# Patient Record
Sex: FEMALE | Race: BLACK OR AFRICAN AMERICAN | NOT HISPANIC OR LATINO | Employment: FULL TIME | URBAN - METROPOLITAN AREA
[De-identification: names, ages, dates, MRNs, and addresses within clinical notes are randomized per-mention and may not be internally consistent; named-entity substitution may affect disease eponyms.]

---

## 2017-05-23 LAB — POC BETA-HCG (QUAL): NEGATIVE

## 2017-12-14 ENCOUNTER — HISTORICAL (OUTPATIENT)
Dept: ADMINISTRATIVE | Facility: HOSPITAL | Age: 22
End: 2017-12-14

## 2017-12-14 LAB
APPEARANCE, UA: ABNORMAL
BACTERIA #/AREA URNS AUTO: ABNORMAL /[HPF]
BILIRUB SERPL-MCNC: NEGATIVE MG/DL
BILIRUB UR QL STRIP: NEGATIVE
BLOOD URINE, POC: NEGATIVE
CLARITY, POC UA: NORMAL
COLOR UR: YELLOW
COLOR, POC UA: YELLOW
GLUCOSE (UA): NORMAL
GLUCOSE UR QL STRIP: NEGATIVE
HGB UR QL STRIP: NEGATIVE
HYALINE CASTS #/AREA URNS LPF: ABNORMAL /[LPF]
KETONES UR QL STRIP: NEGATIVE
KETONES UR QL STRIP: NEGATIVE
LEUKOCYTE EST, POC UA: NORMAL
LEUKOCYTE ESTERASE UR QL STRIP: 500 LEU/UL
NITRITE UR QL STRIP: ABNORMAL
NITRITE, POC UA: POSITIVE
PH UR STRIP: 6.5 [PH] (ref 4.5–8)
PH, POC UA: 6
POC BETA-HCG (QUAL): NEGATIVE
PROT UR QL STRIP: 10 MG/DL
PROTEIN, POC: NORMAL
RBC #/AREA URNS AUTO: ABNORMAL /[HPF]
SP GR UR STRIP: 1.02 (ref 1–1.03)
SPECIFIC GRAVITY, POC UA: 1.01
SQUAMOUS #/AREA URNS LPF: ABNORMAL /[LPF]
UROBILINOGEN UR STRIP-ACNC: NORMAL
UROBILINOGEN, POC UA: NORMAL
WBC #/AREA URNS AUTO: >=100 /HPF

## 2018-11-06 ENCOUNTER — HISTORICAL (OUTPATIENT)
Dept: ADMINISTRATIVE | Facility: HOSPITAL | Age: 23
End: 2018-11-06

## 2018-11-06 LAB
ABS NEUT (OLG): 10.99 X10(3)/MCL (ref 2.1–9.2)
BASOPHILS # BLD AUTO: 0.1 X10(3)/MCL (ref 0–0.2)
BASOPHILS NFR BLD AUTO: 0 %
BUN SERPL-MCNC: 12 MG/DL (ref 7–18)
CALCIUM SERPL-MCNC: 9.7 MG/DL (ref 8.5–10.1)
CHLORIDE SERPL-SCNC: 106 MMOL/L (ref 98–107)
CO2 SERPL-SCNC: 23 MMOL/L (ref 21–32)
CREAT SERPL-MCNC: 0.8 MG/DL (ref 0.55–1.02)
CREAT/UREA NIT SERPL: 15
EOSINOPHIL # BLD AUTO: 0.4 X10(3)/MCL (ref 0–0.9)
EOSINOPHIL NFR BLD AUTO: 3 %
ERYTHROCYTE [DISTWIDTH] IN BLOOD BY AUTOMATED COUNT: 12.6 % (ref 11.5–17)
FLUAV AG NPH QL IA: NEGATIVE
FLUBV AG NPH QL IA: NEGATIVE
GLUCOSE SERPL-MCNC: 94 MG/DL (ref 74–106)
GRAM STN SPEC: NORMAL
HCT VFR BLD AUTO: 40.5 % (ref 37–47)
HGB BLD-MCNC: 12.4 GM/DL (ref 12–16)
LYMPHOCYTES # BLD AUTO: 1.8 X10(3)/MCL (ref 0.6–4.6)
LYMPHOCYTES NFR BLD AUTO: 12 %
MCH RBC QN AUTO: 25.9 PG (ref 27–31)
MCHC RBC AUTO-ENTMCNC: 30.6 GM/DL (ref 33–36)
MCV RBC AUTO: 84.6 FL (ref 80–94)
MONOCYTES # BLD AUTO: 1 X10(3)/MCL (ref 0.1–1.3)
MONOCYTES NFR BLD AUTO: 7 %
NEUTROPHILS # BLD AUTO: 10.99 X10(3)/MCL (ref 2.1–9.2)
NEUTROPHILS NFR BLD AUTO: 77 %
PLATELET # BLD AUTO: 328 X10(3)/MCL (ref 130–400)
PMV BLD AUTO: 9.9 FL (ref 9.4–12.4)
POTASSIUM SERPL-SCNC: 4.1 MMOL/L (ref 3.5–5.1)
RBC # BLD AUTO: 4.79 X10(6)/MCL (ref 4.2–5.4)
SODIUM SERPL-SCNC: 138 MMOL/L (ref 136–145)
WBC # SPEC AUTO: 14.2 X10(3)/MCL (ref 4.5–11.5)

## 2018-11-08 LAB
FINAL CULTURE: NORMAL
RAPID GROUP A STREP (OHS): NEGATIVE

## 2020-05-08 ENCOUNTER — HISTORICAL (OUTPATIENT)
Dept: LAB | Facility: HOSPITAL | Age: 25
End: 2020-05-08

## 2020-08-24 ENCOUNTER — HISTORICAL (OUTPATIENT)
Dept: LAB | Facility: HOSPITAL | Age: 25
End: 2020-08-24

## 2020-08-24 LAB
ABS NEUT (OLG): 8.59 X10(3)/MCL (ref 2.1–9.2)
AMPHET UR QL SCN: NORMAL
BARBITURATE SCN PRESENT UR: NORMAL
BASOPHILS # BLD AUTO: 0 X10(3)/MCL (ref 0–0.2)
BASOPHILS NFR BLD AUTO: 0 %
BENZODIAZ UR QL SCN: NORMAL
CANNABINOIDS UR QL SCN: NORMAL
COCAINE UR QL SCN: NORMAL
EOSINOPHIL # BLD AUTO: 0.2 X10(3)/MCL (ref 0–0.9)
EOSINOPHIL NFR BLD AUTO: 2 %
ERYTHROCYTE [DISTWIDTH] IN BLOOD BY AUTOMATED COUNT: 13.8 % (ref 11.5–17)
GROUP & RH: NORMAL
HBV SURFACE AG SERPL QL IA: NONREACTIVE
HCT VFR BLD AUTO: 35.1 % (ref 37–47)
HCV AB SERPL QL IA: NONREACTIVE
HGB BLD-MCNC: 11.4 GM/DL (ref 12–16)
HIV 1+2 AB+HIV1 P24 AG SERPL QL IA: NONREACTIVE
IMM GRANULOCYTES # BLD AUTO: 0.01 % (ref 0–0.02)
IMM GRANULOCYTES NFR BLD AUTO: 0.1 % (ref 0–0.43)
LYMPHOCYTES # BLD AUTO: 3.5 X10(3)/MCL (ref 0.6–4.6)
LYMPHOCYTES NFR BLD AUTO: 26 %
MCH RBC QN AUTO: 27.3 PG (ref 27–31)
MCHC RBC AUTO-ENTMCNC: 32.5 GM/DL (ref 33–36)
MCV RBC AUTO: 84 FL (ref 80–94)
MONOCYTES # BLD AUTO: 0.8 X10(3)/MCL (ref 0.1–1.3)
MONOCYTES NFR BLD AUTO: 6 %
NEUTROPHILS # BLD AUTO: 8.59 X10(3)/MCL (ref 1.4–7.9)
NEUTROPHILS NFR BLD AUTO: 65 %
OPIATES UR QL SCN: NORMAL
PCP UR QL: NORMAL
PH UR STRIP.AUTO: 6.5 [PH] (ref 5–7.5)
PLATELET # BLD AUTO: 324 X10(3)/MCL (ref 130–400)
PMV BLD AUTO: 10.4 FL (ref 9.4–12.4)
POC BETA-HCG (QUAL): POSITIVE
RBC # BLD AUTO: 4.18 X10(6)/MCL (ref 4.2–5.4)
T PALLIDUM AB SER QL: NONREACTIVE
T4 FREE SERPL-MCNC: 1.05 NG/DL (ref 0.76–1.46)
TSH SERPL-ACNC: 1.13 MIU/ML (ref 0.36–3.74)
WBC # SPEC AUTO: 13.2 X10(3)/MCL (ref 4.5–11.5)

## 2020-10-19 ENCOUNTER — HISTORICAL (OUTPATIENT)
Dept: LAB | Facility: HOSPITAL | Age: 25
End: 2020-10-19

## 2020-12-17 ENCOUNTER — HISTORICAL (OUTPATIENT)
Dept: LAB | Facility: HOSPITAL | Age: 25
End: 2020-12-17

## 2020-12-17 LAB — GLUCOSE 1H P 100 G GLC PO SERPL-MCNC: 95 MG/DL (ref 100–180)

## 2021-02-22 ENCOUNTER — HISTORICAL (OUTPATIENT)
Dept: ADMINISTRATIVE | Facility: HOSPITAL | Age: 26
End: 2021-02-22

## 2021-02-23 ENCOUNTER — HISTORICAL (OUTPATIENT)
Dept: LAB | Facility: HOSPITAL | Age: 26
End: 2021-02-23

## 2021-02-23 LAB
ABS NEUT (OLG): 8.27 X10(3)/MCL (ref 2.1–9.2)
BASOPHILS # BLD AUTO: 0 X10(3)/MCL (ref 0–0.2)
BASOPHILS NFR BLD AUTO: 0 %
EOSINOPHIL # BLD AUTO: 0.3 X10(3)/MCL (ref 0–0.9)
EOSINOPHIL NFR BLD AUTO: 3 %
ERYTHROCYTE [DISTWIDTH] IN BLOOD BY AUTOMATED COUNT: 13.2 % (ref 11.5–17)
HCT VFR BLD AUTO: 34.6 % (ref 37–47)
HGB BLD-MCNC: 10.5 GM/DL (ref 12–16)
HIV 1+2 AB+HIV1 P24 AG SERPL QL IA: NONREACTIVE
IMM GRANULOCYTES # BLD AUTO: 0.05 % (ref 0–0.02)
IMM GRANULOCYTES NFR BLD AUTO: 0.4 % (ref 0–0.43)
LYMPHOCYTES # BLD AUTO: 2.4 X10(3)/MCL (ref 0.6–4.6)
LYMPHOCYTES NFR BLD AUTO: 20 %
MCH RBC QN AUTO: 26.3 PG (ref 27–31)
MCHC RBC AUTO-ENTMCNC: 30.3 GM/DL (ref 33–36)
MCV RBC AUTO: 86.5 FL (ref 80–94)
MONOCYTES # BLD AUTO: 1 X10(3)/MCL (ref 0.1–1.3)
MONOCYTES NFR BLD AUTO: 8 %
NEUTROPHILS # BLD AUTO: 8.27 X10(3)/MCL (ref 1.4–7.9)
NEUTROPHILS NFR BLD AUTO: 69 %
PLATELET # BLD AUTO: 248 X10(3)/MCL (ref 130–400)
PMV BLD AUTO: 11 FL (ref 9.4–12.4)
RBC # BLD AUTO: 4 X10(6)/MCL (ref 4.2–5.4)
T PALLIDUM AB SER QL: NONREACTIVE
WBC # SPEC AUTO: 12.1 X10(3)/MCL (ref 4.5–11.5)

## 2021-02-25 LAB — FINAL CULTURE: NORMAL

## 2021-04-01 ENCOUNTER — HOSPITAL ENCOUNTER (OUTPATIENT)
Dept: OBSTETRICS AND GYNECOLOGY | Facility: HOSPITAL | Age: 26
End: 2021-04-02
Attending: OBSTETRICS & GYNECOLOGY | Admitting: OBSTETRICS & GYNECOLOGY

## 2021-04-01 LAB
ABS NEUT (OLG): 19.37 X10(3)/MCL (ref 2.1–9.2)
APPEARANCE, UA: CLEAR
BACTERIA SPEC CULT: ABNORMAL /HPF
BILIRUB UR QL STRIP: NEGATIVE
COLOR UR: YELLOW
EOSINOPHIL NFR BLD MANUAL: 1 % (ref 0–8)
ERYTHROCYTE [DISTWIDTH] IN BLOOD BY AUTOMATED COUNT: 15.1 % (ref 11.5–17)
GLUCOSE (UA): NEGATIVE
HCT VFR BLD AUTO: 30.8 % (ref 37–47)
HGB BLD-MCNC: 8.9 GM/DL (ref 12–16)
HGB UR QL STRIP: NEGATIVE
HYPOCHROMIA BLD QL SMEAR: ABNORMAL
KETONES UR QL STRIP: ABNORMAL
LEUKOCYTE ESTERASE UR QL STRIP: NEGATIVE
LYMPHOCYTES NFR BLD MANUAL: 12 % (ref 13–40)
MCH RBC QN AUTO: 25.5 PG (ref 27–31)
MCHC RBC AUTO-ENTMCNC: 28.9 GM/DL (ref 33–36)
MCV RBC AUTO: 88.3 FL (ref 80–94)
MONOCYTES NFR BLD MANUAL: 4 % (ref 2–11)
NEUTROPHILS NFR BLD MANUAL: 84 % (ref 47–80)
NITRITE UR QL STRIP: NEGATIVE
PH UR STRIP: 7 [PH] (ref 5–9)
PLATELET # BLD AUTO: 414 X10(3)/MCL (ref 130–400)
PLATELET # BLD EST: ABNORMAL 10*3/UL
PMV BLD AUTO: 10.1 FL (ref 7.4–10.4)
POIKILOCYTOSIS BLD QL SMEAR: ABNORMAL
PROT UR QL STRIP: NEGATIVE
RBC # BLD AUTO: 3.49 X10(6)/MCL (ref 4.2–5.4)
RBC #/AREA URNS HPF: ABNORMAL /[HPF]
RBC MORPH BLD: ABNORMAL
SP GR UR STRIP: 1.01 (ref 1–1.03)
SQUAMOUS EPITHELIAL, UA: ABNORMAL /HPF (ref 0–4)
UROBILINOGEN UR STRIP-ACNC: 1
WBC # SPEC AUTO: 25.2 X10(3)/MCL (ref 4.5–11.5)
WBC #/AREA URNS HPF: ABNORMAL /[HPF]

## 2021-04-04 LAB — FINAL CULTURE: NORMAL

## 2021-05-04 LAB — POC BETA-HCG (QUAL): NEGATIVE

## 2021-07-06 LAB — POC BETA-HCG (QUAL): NEGATIVE

## 2021-08-03 LAB
HUMAN PAPILLOMAVIRUS (HPV): NORMAL
PAP RECOMMENDATION EXT: NORMAL
POC BETA-HCG (QUAL): NEGATIVE

## 2021-10-29 LAB — POC BETA-HCG (QUAL): NEGATIVE

## 2022-04-10 ENCOUNTER — HISTORICAL (OUTPATIENT)
Dept: ADMINISTRATIVE | Facility: HOSPITAL | Age: 27
End: 2022-04-10

## 2022-04-25 VITALS
WEIGHT: 158 LBS | DIASTOLIC BLOOD PRESSURE: 67 MMHG | BODY MASS INDEX: 28 KG/M2 | SYSTOLIC BLOOD PRESSURE: 114 MMHG | HEIGHT: 63 IN | OXYGEN SATURATION: 98 %

## 2022-09-18 ENCOUNTER — HISTORICAL (OUTPATIENT)
Dept: ADMINISTRATIVE | Facility: HOSPITAL | Age: 27
End: 2022-09-18

## 2022-09-19 ENCOUNTER — HISTORICAL (OUTPATIENT)
Dept: ADMINISTRATIVE | Facility: HOSPITAL | Age: 27
End: 2022-09-19

## 2022-10-28 DIAGNOSIS — Z00.00 WELLNESS EXAMINATION: Primary | ICD-10-CM

## 2022-10-31 ENCOUNTER — OFFICE VISIT (OUTPATIENT)
Dept: FAMILY MEDICINE | Facility: CLINIC | Age: 27
End: 2022-10-31
Payer: COMMERCIAL

## 2022-10-31 ENCOUNTER — LAB VISIT (OUTPATIENT)
Dept: LAB | Facility: HOSPITAL | Age: 27
End: 2022-10-31
Attending: NURSE PRACTITIONER
Payer: COMMERCIAL

## 2022-10-31 VITALS
WEIGHT: 175.19 LBS | DIASTOLIC BLOOD PRESSURE: 65 MMHG | BODY MASS INDEX: 32.24 KG/M2 | HEART RATE: 78 BPM | SYSTOLIC BLOOD PRESSURE: 104 MMHG | TEMPERATURE: 99 F | RESPIRATION RATE: 18 BRPM | HEIGHT: 62 IN | OXYGEN SATURATION: 99 %

## 2022-10-31 DIAGNOSIS — Z76.89 ENCOUNTER TO ESTABLISH CARE: ICD-10-CM

## 2022-10-31 DIAGNOSIS — Z00.00 WELLNESS EXAMINATION: ICD-10-CM

## 2022-10-31 DIAGNOSIS — F32.A DEPRESSION, UNSPECIFIED DEPRESSION TYPE: ICD-10-CM

## 2022-10-31 DIAGNOSIS — Z23 ENCOUNTER FOR VACCINATION: Primary | ICD-10-CM

## 2022-10-31 LAB
ALBUMIN SERPL-MCNC: 4 GM/DL (ref 3.5–5)
ALBUMIN/GLOB SERPL: 1.3 RATIO (ref 1.1–2)
ALP SERPL-CCNC: 58 UNIT/L (ref 40–150)
ALT SERPL-CCNC: 13 UNIT/L (ref 0–55)
AST SERPL-CCNC: 15 UNIT/L (ref 5–34)
BASOPHILS # BLD AUTO: 0.04 X10(3)/MCL (ref 0–0.2)
BASOPHILS NFR BLD AUTO: 0.4 %
BILIRUBIN DIRECT+TOT PNL SERPL-MCNC: 0.3 MG/DL
BUN SERPL-MCNC: 10.1 MG/DL (ref 7–18.7)
CALCIUM SERPL-MCNC: 9.1 MG/DL (ref 8.4–10.2)
CHLORIDE SERPL-SCNC: 108 MMOL/L (ref 98–107)
CHOLEST SERPL-MCNC: 134 MG/DL
CHOLEST/HDLC SERPL: 3 {RATIO} (ref 0–5)
CO2 SERPL-SCNC: 22 MMOL/L (ref 22–29)
CREAT SERPL-MCNC: 0.78 MG/DL (ref 0.55–1.02)
DEPRECATED CALCIDIOL+CALCIFEROL SERPL-MC: 8.8 NG/ML (ref 30–80)
EOSINOPHIL # BLD AUTO: 0.42 X10(3)/MCL (ref 0–0.9)
EOSINOPHIL NFR BLD AUTO: 4 %
ERYTHROCYTE [DISTWIDTH] IN BLOOD BY AUTOMATED COUNT: 13.1 % (ref 11.5–17)
EST. AVERAGE GLUCOSE BLD GHB EST-MCNC: 96.8 MG/DL
GFR SERPLBLD CREATININE-BSD FMLA CKD-EPI: >60 MLS/MIN/1.73/M2
GLOBULIN SER-MCNC: 3.1 GM/DL (ref 2.4–3.5)
GLUCOSE SERPL-MCNC: 97 MG/DL (ref 74–100)
HBA1C MFR BLD: 5 %
HCT VFR BLD AUTO: 40.3 % (ref 37–47)
HDLC SERPL-MCNC: 46 MG/DL (ref 35–60)
HGB BLD-MCNC: 12 GM/DL (ref 12–16)
IMM GRANULOCYTES # BLD AUTO: 0.01 X10(3)/MCL (ref 0–0.04)
IMM GRANULOCYTES NFR BLD AUTO: 0.1 %
LDLC SERPL CALC-MCNC: 78 MG/DL (ref 50–140)
LYMPHOCYTES # BLD AUTO: 3.88 X10(3)/MCL (ref 0.6–4.6)
LYMPHOCYTES NFR BLD AUTO: 36.5 %
MCH RBC QN AUTO: 24.9 PG (ref 27–31)
MCHC RBC AUTO-ENTMCNC: 29.8 MG/DL (ref 33–36)
MCV RBC AUTO: 83.8 FL (ref 80–94)
MONOCYTES # BLD AUTO: 0.58 X10(3)/MCL (ref 0.1–1.3)
MONOCYTES NFR BLD AUTO: 5.5 %
NEUTROPHILS # BLD AUTO: 5.7 X10(3)/MCL (ref 2.1–9.2)
NEUTROPHILS NFR BLD AUTO: 53.5 %
PLATELET # BLD AUTO: 358 X10(3)/MCL (ref 130–400)
PMV BLD AUTO: 10.4 FL (ref 7.4–10.4)
POTASSIUM SERPL-SCNC: 4 MMOL/L (ref 3.5–5.1)
PROT SERPL-MCNC: 7.1 GM/DL (ref 6.4–8.3)
RBC # BLD AUTO: 4.81 X10(6)/MCL (ref 4.2–5.4)
SODIUM SERPL-SCNC: 140 MMOL/L (ref 136–145)
TRIGL SERPL-MCNC: 51 MG/DL (ref 37–140)
TSH SERPL-ACNC: 1.35 UIU/ML (ref 0.35–4.94)
VLDLC SERPL CALC-MCNC: 10 MG/DL
WBC # SPEC AUTO: 10.6 X10(3)/MCL (ref 4.5–11.5)

## 2022-10-31 PROCEDURE — 84443 ASSAY THYROID STIM HORMONE: CPT

## 2022-10-31 PROCEDURE — 3074F SYST BP LT 130 MM HG: CPT | Mod: CPTII,,, | Performed by: NURSE PRACTITIONER

## 2022-10-31 PROCEDURE — 99385 PREV VISIT NEW AGE 18-39: CPT | Mod: 25,,, | Performed by: NURSE PRACTITIONER

## 2022-10-31 PROCEDURE — 99212 OFFICE O/P EST SF 10 MIN: CPT | Mod: 25,,, | Performed by: NURSE PRACTITIONER

## 2022-10-31 PROCEDURE — 3074F PR MOST RECENT SYSTOLIC BLOOD PRESSURE < 130 MM HG: ICD-10-PCS | Mod: CPTII,,, | Performed by: NURSE PRACTITIONER

## 2022-10-31 PROCEDURE — 90686 IIV4 VACC NO PRSV 0.5 ML IM: CPT | Mod: ,,, | Performed by: NURSE PRACTITIONER

## 2022-10-31 PROCEDURE — 83036 HEMOGLOBIN GLYCOSYLATED A1C: CPT

## 2022-10-31 PROCEDURE — 1159F PR MEDICATION LIST DOCUMENTED IN MEDICAL RECORD: ICD-10-PCS | Mod: CPTII,,, | Performed by: NURSE PRACTITIONER

## 2022-10-31 PROCEDURE — 90471 PR IMMUNIZ ADMIN,1 SINGLE/COMB VAC/TOXOID: ICD-10-PCS | Mod: ,,, | Performed by: NURSE PRACTITIONER

## 2022-10-31 PROCEDURE — 1160F PR REVIEW ALL MEDS BY PRESCRIBER/CLIN PHARMACIST DOCUMENTED: ICD-10-PCS | Mod: CPTII,,, | Performed by: NURSE PRACTITIONER

## 2022-10-31 PROCEDURE — 85025 COMPLETE CBC W/AUTO DIFF WBC: CPT

## 2022-10-31 PROCEDURE — 90471 IMMUNIZATION ADMIN: CPT | Mod: ,,, | Performed by: NURSE PRACTITIONER

## 2022-10-31 PROCEDURE — 3078F PR MOST RECENT DIASTOLIC BLOOD PRESSURE < 80 MM HG: ICD-10-PCS | Mod: CPTII,,, | Performed by: NURSE PRACTITIONER

## 2022-10-31 PROCEDURE — 99212 PR OFFICE/OUTPT VISIT, EST, LEVL II, 10-19 MIN: ICD-10-PCS | Mod: 25,,, | Performed by: NURSE PRACTITIONER

## 2022-10-31 PROCEDURE — 36415 COLL VENOUS BLD VENIPUNCTURE: CPT

## 2022-10-31 PROCEDURE — 3078F DIAST BP <80 MM HG: CPT | Mod: CPTII,,, | Performed by: NURSE PRACTITIONER

## 2022-10-31 PROCEDURE — 1159F MED LIST DOCD IN RCRD: CPT | Mod: CPTII,,, | Performed by: NURSE PRACTITIONER

## 2022-10-31 PROCEDURE — 99385 PR PREVENTIVE VISIT,NEW,18-39: ICD-10-PCS | Mod: 25,,, | Performed by: NURSE PRACTITIONER

## 2022-10-31 PROCEDURE — 1160F RVW MEDS BY RX/DR IN RCRD: CPT | Mod: CPTII,,, | Performed by: NURSE PRACTITIONER

## 2022-10-31 PROCEDURE — 80053 COMPREHEN METABOLIC PANEL: CPT

## 2022-10-31 PROCEDURE — 80061 LIPID PANEL: CPT

## 2022-10-31 PROCEDURE — 90686 PR FLU VACCINE, QIIV4, NO PRSV, 0.5 ML, IM: ICD-10-PCS | Mod: ,,, | Performed by: NURSE PRACTITIONER

## 2022-10-31 PROCEDURE — 82306 VITAMIN D 25 HYDROXY: CPT

## 2022-10-31 RX ORDER — MEDROXYPROGESTERONE ACETATE 150 MG/ML
INJECTION, SUSPENSION INTRAMUSCULAR
COMMUNITY
Start: 2022-10-24 | End: 2023-04-25 | Stop reason: CLARIF

## 2022-10-31 RX ORDER — ESCITALOPRAM OXALATE 5 MG/1
5 TABLET ORAL DAILY
Qty: 30 TABLET | Refills: 11 | Status: SHIPPED | OUTPATIENT
Start: 2022-10-31 | End: 2022-11-30 | Stop reason: SDUPTHER

## 2022-10-31 NOTE — ASSESSMENT & PLAN NOTE
Symptoms uncontrolled. Start Lexapro 5 mg p.o. daily.  Return to clinic in 1 month for follow-up appointment.  Report to ED with any suicidal or homicidal ideations.

## 2022-10-31 NOTE — PROGRESS NOTES
ANNUAL WELLNESS NOTE    Chief Complaint:  Establish Care, Annual Exam, Depression, Back Pain, Irregular Heart Beat (Elevated pulse at times), and Anxiety     HPI:  Yesi Porter is a 27 y.o. female who presents to the clinic to establish care.    No past medical history on file.    Patient Care Team:  CATY Butt as PCP - General (Nurse Practitioner)    Nursing Assessments and Health Risk Assessment:  No flowsheet data found.  Fall Risk Assessment - Outpatient 10/31/2022   Mobility Status Ambulatory   Number of falls 0   Identified as fall risk 0       Review of Systems   Constitutional: Negative.    HENT: Negative.     Eyes: Negative.    Respiratory: Negative.     Cardiovascular: Negative.    Gastrointestinal: Negative.    Endocrine: Negative.    Genitourinary: Negative.    Musculoskeletal: Negative.    Integumentary:  Negative.   Allergic/Immunologic: Negative.    Neurological: Negative.    Hematological: Negative.    Psychiatric/Behavioral:  Positive for dysphoric mood. The patient is nervous/anxious.      Physical Exam  Constitutional:       Appearance: Normal appearance. She is obese.   HENT:      Head: Normocephalic.      Right Ear: Tympanic membrane, ear canal and external ear normal.      Left Ear: Tympanic membrane, ear canal and external ear normal.      Nose: Nose normal.      Mouth/Throat:      Mouth: Mucous membranes are moist.      Pharynx: Oropharynx is clear.   Eyes:      Extraocular Movements: Extraocular movements intact.      Conjunctiva/sclera: Conjunctivae normal.      Pupils: Pupils are equal, round, and reactive to light.   Cardiovascular:      Rate and Rhythm: Normal rate and regular rhythm.      Pulses: Normal pulses.      Heart sounds: Normal heart sounds.   Pulmonary:      Effort: Pulmonary effort is normal.      Breath sounds: Normal breath sounds.   Abdominal:      General: Bowel sounds are normal.      Palpations: Abdomen is soft.   Musculoskeletal:         General: Normal  range of motion.      Cervical back: Normal range of motion and neck supple.   Skin:     General: Skin is warm and dry.   Neurological:      General: No focal deficit present.      Mental Status: She is alert and oriented to person, place, and time. Mental status is at baseline.   Psychiatric:         Mood and Affect: Mood is depressed. Affect is tearful.         Speech: Speech normal.         Behavior: Behavior normal.         Thought Content: Thought content normal.         Judgment: Judgment normal.     Results for orders placed or performed in visit on 10/31/22   Comprehensive Metabolic Panel   Result Value Ref Range    Sodium Level 140 136 - 145 mmol/L    Potassium Level 4.0 3.5 - 5.1 mmol/L    Chloride 108 (H) 98 - 107 mmol/L    Carbon Dioxide 22 22 - 29 mmol/L    Glucose Level 97 74 - 100 mg/dL    Blood Urea Nitrogen 10.1 7.0 - 18.7 mg/dL    Creatinine 0.78 0.55 - 1.02 mg/dL    Calcium Level Total 9.1 8.4 - 10.2 mg/dL    Protein Total 7.1 6.4 - 8.3 gm/dL    Albumin Level 4.0 3.5 - 5.0 gm/dL    Globulin 3.1 2.4 - 3.5 gm/dL    Albumin/Globulin Ratio 1.3 1.1 - 2.0 ratio    Bilirubin Total 0.3 <=1.5 mg/dL    Alkaline Phosphatase 58 40 - 150 unit/L    Alanine Aminotransferase 13 0 - 55 unit/L    Aspartate Aminotransferase 15 5 - 34 unit/L    eGFR >60 mls/min/1.73/m2   Lipid Panel   Result Value Ref Range    Cholesterol Total 134 <=200 mg/dL    HDL Cholesterol 46 35 - 60 mg/dL    Triglyceride 51 37 - 140 mg/dL    Cholesterol/HDL Ratio 3 0 - 5    Very Low Density Lipoprotein 10     LDL Cholesterol 78.00 50.00 - 140.00 mg/dL   Hemoglobin A1C   Result Value Ref Range    Hemoglobin A1c 5.0 <=7.0 %    Estimated Average Glucose 96.8 mg/dL   Vitamin D   Result Value Ref Range    Vit D 25 OH 8.8 (L) 30.0 - 80.0 ng/mL   TSH   Result Value Ref Range    Thyroid Stimulating Hormone 1.3530 0.3500 - 4.9400 uIU/mL   CBC with Differential   Result Value Ref Range    WBC 10.6 4.5 - 11.5 x10(3)/mcL    RBC 4.81 4.20 - 5.40 x10(6)/mcL     Hgb 12.0 12.0 - 16.0 gm/dL    Hct 40.3 37.0 - 47.0 %    MCV 83.8 80.0 - 94.0 fL    MCH 24.9 (L) 27.0 - 31.0 pg    MCHC 29.8 (L) 33.0 - 36.0 mg/dL    RDW 13.1 11.5 - 17.0 %    Platelet 358 130 - 400 x10(3)/mcL    MPV 10.4 7.4 - 10.4 fL    Neut % 53.5 %    Lymph % 36.5 %    Mono % 5.5 %    Eos % 4.0 %    Basophil % 0.4 %    Lymph # 3.88 0.6 - 4.6 x10(3)/mcL    Neut # 5.7 2.1 - 9.2 x10(3)/mcL    Mono # 0.58 0.1 - 1.3 x10(3)/mcL    Eos # 0.42 0 - 0.9 x10(3)/mcL    Baso # 0.04 0 - 0.2 x10(3)/mcL    IG# 0.01 0 - 0.04 x10(3)/mcL    IG% 0.1 %     Health Maintenance:  Health Maintenance Topics with due status: Not Due       Topic Last Completion Date    TETANUS VACCINE 03/31/2018     Health Maintenance Due   Topic Date Due    HPV Vaccines (1 - 2-dose series) Never done    Pap Smear  Never done    COVID-19 Vaccine (3 - Booster for Pfizer series) 10/27/2021     Assessment/Plan:  1. Encounter for vaccination  Assessment & Plan:  Influenza vaccination given in office.  Patient tolerated well.      2. Depression, unspecified depression type  Assessment & Plan:  Symptoms uncontrolled. Start Lexapro 5 mg p.o. daily.  Return to clinic in 1 month for follow-up appointment.  Report to ED with any suicidal or homicidal ideations.      3. Wellness examination    4. Encounter to establish care    Other orders  -     influenza (QUADRIVALENT PF) 60 mcg (15 mcg x 4)/0.5 mL vaccine  -     EScitalopram oxalate (LEXAPRO) 5 MG Tab; Take 1 tablet (5 mg total) by mouth once daily.  Dispense: 30 tablet; Refill: 11      RTC in 1 month for follow up appt.

## 2022-11-01 RX ORDER — ASPIRIN 325 MG
50000 TABLET, DELAYED RELEASE (ENTERIC COATED) ORAL WEEKLY
Qty: 12 CAPSULE | Refills: 0 | Status: SHIPPED | OUTPATIENT
Start: 2022-11-01 | End: 2022-11-17 | Stop reason: SDUPTHER

## 2022-11-17 RX ORDER — ASPIRIN 325 MG
50000 TABLET, DELAYED RELEASE (ENTERIC COATED) ORAL WEEKLY
Qty: 12 CAPSULE | Refills: 0 | Status: SHIPPED | OUTPATIENT
Start: 2022-11-17 | End: 2023-02-03

## 2022-11-29 NOTE — PROGRESS NOTES
Patient Centered Pre Visit :    Pre-Visit Chart Review    Seen any other health care providers since last visit? NO    Seen in ER, urgent care clinic, or been admitted since last visit? NO    Preventative health screenings current:   Mammogram DUE:N/A  Colonoscopy DUE:N/A    Target Diagnosis: DEPRESSION    Lab work/Tests Needed:NO    Wellness: 10/2022

## 2022-11-30 ENCOUNTER — OFFICE VISIT (OUTPATIENT)
Dept: FAMILY MEDICINE | Facility: CLINIC | Age: 27
End: 2022-11-30
Payer: COMMERCIAL

## 2022-11-30 VITALS
OXYGEN SATURATION: 98 % | SYSTOLIC BLOOD PRESSURE: 107 MMHG | WEIGHT: 174.38 LBS | TEMPERATURE: 99 F | HEIGHT: 62 IN | RESPIRATION RATE: 18 BRPM | BODY MASS INDEX: 32.09 KG/M2 | DIASTOLIC BLOOD PRESSURE: 68 MMHG | HEART RATE: 78 BPM

## 2022-11-30 DIAGNOSIS — F32.A DEPRESSION, UNSPECIFIED DEPRESSION TYPE: Primary | ICD-10-CM

## 2022-11-30 PROCEDURE — 3074F PR MOST RECENT SYSTOLIC BLOOD PRESSURE < 130 MM HG: ICD-10-PCS | Mod: CPTII,,, | Performed by: NURSE PRACTITIONER

## 2022-11-30 PROCEDURE — 3078F PR MOST RECENT DIASTOLIC BLOOD PRESSURE < 80 MM HG: ICD-10-PCS | Mod: CPTII,,, | Performed by: NURSE PRACTITIONER

## 2022-11-30 PROCEDURE — 3008F BODY MASS INDEX DOCD: CPT | Mod: CPTII,,, | Performed by: NURSE PRACTITIONER

## 2022-11-30 PROCEDURE — 3078F DIAST BP <80 MM HG: CPT | Mod: CPTII,,, | Performed by: NURSE PRACTITIONER

## 2022-11-30 PROCEDURE — 3008F PR BODY MASS INDEX (BMI) DOCUMENTED: ICD-10-PCS | Mod: CPTII,,, | Performed by: NURSE PRACTITIONER

## 2022-11-30 PROCEDURE — 1159F PR MEDICATION LIST DOCUMENTED IN MEDICAL RECORD: ICD-10-PCS | Mod: CPTII,,, | Performed by: NURSE PRACTITIONER

## 2022-11-30 PROCEDURE — 3074F SYST BP LT 130 MM HG: CPT | Mod: CPTII,,, | Performed by: NURSE PRACTITIONER

## 2022-11-30 PROCEDURE — 1159F MED LIST DOCD IN RCRD: CPT | Mod: CPTII,,, | Performed by: NURSE PRACTITIONER

## 2022-11-30 PROCEDURE — 99213 OFFICE O/P EST LOW 20 MIN: CPT | Mod: ,,, | Performed by: NURSE PRACTITIONER

## 2022-11-30 PROCEDURE — 99213 PR OFFICE/OUTPT VISIT, EST, LEVL III, 20-29 MIN: ICD-10-PCS | Mod: ,,, | Performed by: NURSE PRACTITIONER

## 2022-11-30 RX ORDER — ESCITALOPRAM OXALATE 10 MG/1
10 TABLET ORAL DAILY
Qty: 30 TABLET | Refills: 11 | Status: SHIPPED | OUTPATIENT
Start: 2022-11-30 | End: 2023-07-12

## 2022-11-30 NOTE — ASSESSMENT & PLAN NOTE
Not at goal.  Patient reports improvement in symptoms but would like to increase dose.  Reports occasional episodes depression.  Lexapro increased to 10 mg p.o. daily.  Return to clinic in 1 month for follow-up appointment.  Report to the ED with any suicidal or homicidal ideations.

## 2022-11-30 NOTE — PROGRESS NOTES
Subjective:       Patient ID: Yesi Porter is a 27 y.o. female.    ----------------------------  Depression     Chief Complaint: Depression    This is a 27-year-old female presents to the clinic for a one-month follow-up appointment for depression management.  Patient reports she notices some improvement with symptoms.  She does not feel that they are completely resolved.  Requesting to increase dose.  Denies any suicidal or homicidal ideations.    Review of Systems   Constitutional: Negative.    HENT: Negative.     Eyes: Negative.    Respiratory: Negative.     Cardiovascular: Negative.    Gastrointestinal: Negative.    Endocrine: Negative.    Genitourinary: Negative.    Musculoskeletal: Negative.    Integumentary:  Negative.   Allergic/Immunologic: Negative.    Neurological: Negative.    Hematological: Negative.    Psychiatric/Behavioral:  Positive for dysphoric mood. The patient is nervous/anxious.          Objective:      Physical Exam  Constitutional:       Appearance: Normal appearance. She is obese.   HENT:      Head: Normocephalic.      Right Ear: Tympanic membrane, ear canal and external ear normal.      Left Ear: Tympanic membrane, ear canal and external ear normal.      Nose: Nose normal.      Mouth/Throat:      Mouth: Mucous membranes are moist.      Pharynx: Oropharynx is clear.   Eyes:      Extraocular Movements: Extraocular movements intact.      Conjunctiva/sclera: Conjunctivae normal.      Pupils: Pupils are equal, round, and reactive to light.   Cardiovascular:      Rate and Rhythm: Normal rate and regular rhythm.      Pulses: Normal pulses.      Heart sounds: Normal heart sounds.   Pulmonary:      Effort: Pulmonary effort is normal.      Breath sounds: Normal breath sounds.   Abdominal:      General: Bowel sounds are normal.      Palpations: Abdomen is soft.   Musculoskeletal:         General: Normal range of motion.      Cervical back: Normal range of motion and neck supple.   Skin:     General:  Skin is warm and dry.   Neurological:      General: No focal deficit present.      Mental Status: She is alert and oriented to person, place, and time. Mental status is at baseline.   Psychiatric:         Mood and Affect: Mood normal.         Behavior: Behavior normal.         Thought Content: Thought content normal.         Judgment: Judgment normal.         Assessment & Plan:   1. Depression, unspecified depression type  Assessment & Plan:  Not at goal.  Patient reports improvement in symptoms but would like to increase dose.  Reports occasional episodes depression.  Lexapro increased to 10 mg p.o. daily.  Return to clinic in 1 month for follow-up appointment.  Report to the ED with any suicidal or homicidal ideations.      Other orders  -     EScitalopram oxalate (LEXAPRO) 10 MG tablet; Take 1 tablet (10 mg total) by mouth once daily.  Dispense: 30 tablet; Refill: 11        Follow up in about 4 weeks (around 12/28/2022) for Depression. In addition to their scheduled follow up, the patient has also been instructed to follow up on as needed basis.

## 2023-01-09 NOTE — PROGRESS NOTES
Patient Centered Pre Visit :    Pre-Visit Chart Review    Seen any other health care providers since last visit?N    Seen in ER, urgent care clinic, or been admitted since last visit?N    Preventative health screenings current:   Never done  Pap Smear    Target Diagnosis: 1 MO FU/DEPRESSION    Lab work/Tests Needed:N    Wellness: 10/2023

## 2023-01-10 ENCOUNTER — OFFICE VISIT (OUTPATIENT)
Dept: FAMILY MEDICINE | Facility: CLINIC | Age: 28
End: 2023-01-10
Payer: COMMERCIAL

## 2023-01-10 VITALS
HEART RATE: 95 BPM | DIASTOLIC BLOOD PRESSURE: 78 MMHG | SYSTOLIC BLOOD PRESSURE: 111 MMHG | HEIGHT: 60 IN | BODY MASS INDEX: 34.99 KG/M2 | TEMPERATURE: 98 F | WEIGHT: 178.19 LBS

## 2023-01-10 DIAGNOSIS — F32.A DEPRESSION, UNSPECIFIED DEPRESSION TYPE: Primary | ICD-10-CM

## 2023-01-10 DIAGNOSIS — E66.9 OBESITY, UNSPECIFIED CLASSIFICATION, UNSPECIFIED OBESITY TYPE, UNSPECIFIED WHETHER SERIOUS COMORBIDITY PRESENT: ICD-10-CM

## 2023-01-10 PROCEDURE — 1160F PR REVIEW ALL MEDS BY PRESCRIBER/CLIN PHARMACIST DOCUMENTED: ICD-10-PCS | Mod: CPTII,,, | Performed by: NURSE PRACTITIONER

## 2023-01-10 PROCEDURE — 1159F PR MEDICATION LIST DOCUMENTED IN MEDICAL RECORD: ICD-10-PCS | Mod: CPTII,,, | Performed by: NURSE PRACTITIONER

## 2023-01-10 PROCEDURE — 3008F PR BODY MASS INDEX (BMI) DOCUMENTED: ICD-10-PCS | Mod: CPTII,,, | Performed by: NURSE PRACTITIONER

## 2023-01-10 PROCEDURE — 99213 PR OFFICE/OUTPT VISIT, EST, LEVL III, 20-29 MIN: ICD-10-PCS | Mod: ,,, | Performed by: NURSE PRACTITIONER

## 2023-01-10 PROCEDURE — 99213 OFFICE O/P EST LOW 20 MIN: CPT | Mod: ,,, | Performed by: NURSE PRACTITIONER

## 2023-01-10 PROCEDURE — 1159F MED LIST DOCD IN RCRD: CPT | Mod: CPTII,,, | Performed by: NURSE PRACTITIONER

## 2023-01-10 PROCEDURE — 3078F DIAST BP <80 MM HG: CPT | Mod: CPTII,,, | Performed by: NURSE PRACTITIONER

## 2023-01-10 PROCEDURE — 3074F SYST BP LT 130 MM HG: CPT | Mod: CPTII,,, | Performed by: NURSE PRACTITIONER

## 2023-01-10 PROCEDURE — 1160F RVW MEDS BY RX/DR IN RCRD: CPT | Mod: CPTII,,, | Performed by: NURSE PRACTITIONER

## 2023-01-10 PROCEDURE — 3074F PR MOST RECENT SYSTOLIC BLOOD PRESSURE < 130 MM HG: ICD-10-PCS | Mod: CPTII,,, | Performed by: NURSE PRACTITIONER

## 2023-01-10 PROCEDURE — 3078F PR MOST RECENT DIASTOLIC BLOOD PRESSURE < 80 MM HG: ICD-10-PCS | Mod: CPTII,,, | Performed by: NURSE PRACTITIONER

## 2023-01-10 PROCEDURE — 3008F BODY MASS INDEX DOCD: CPT | Mod: CPTII,,, | Performed by: NURSE PRACTITIONER

## 2023-01-10 NOTE — PROGRESS NOTES
Subjective:       Patient ID: Yesi Porter is a 27 y.o. female.    ----------------------------  Depression     Chief Complaint: No chief complaint on file.    This is a 27-year-old female who presents to the clinic for 1 month follow-up appt for depression management.  Patient reports improvement in symptoms.  Denies any suicidal or homicidal ideations.    Review of Systems   Constitutional: Negative.    HENT: Negative.     Eyes: Negative.    Respiratory: Negative.     Cardiovascular: Negative.    Gastrointestinal: Negative.    Endocrine: Negative.    Genitourinary: Negative.    Musculoskeletal: Negative.    Integumentary:  Negative.   Allergic/Immunologic: Negative.    Neurological: Negative.    Hematological: Negative.    Psychiatric/Behavioral: Negative.           Objective:      Physical Exam  Constitutional:       Appearance: Normal appearance. She is obese.   HENT:      Head: Normocephalic.      Right Ear: Tympanic membrane, ear canal and external ear normal.      Left Ear: Tympanic membrane, ear canal and external ear normal.      Nose: Nose normal.      Mouth/Throat:      Mouth: Mucous membranes are moist.      Pharynx: Oropharynx is clear.   Eyes:      Extraocular Movements: Extraocular movements intact.      Conjunctiva/sclera: Conjunctivae normal.      Pupils: Pupils are equal, round, and reactive to light.   Cardiovascular:      Rate and Rhythm: Normal rate and regular rhythm.      Pulses: Normal pulses.      Heart sounds: Normal heart sounds.   Pulmonary:      Effort: Pulmonary effort is normal.      Breath sounds: Normal breath sounds.   Abdominal:      General: Bowel sounds are normal.      Palpations: Abdomen is soft.   Musculoskeletal:         General: Normal range of motion.      Cervical back: Normal range of motion and neck supple.   Skin:     General: Skin is warm and dry.   Neurological:      General: No focal deficit present.      Mental Status: She is alert and oriented to person, place, and  time. Mental status is at baseline.   Psychiatric:         Mood and Affect: Mood normal.         Behavior: Behavior normal.         Thought Content: Thought content normal.         Judgment: Judgment normal.         Assessment & Plan:   1. Depression, unspecified depression type  Assessment & Plan:  Stable.  Continue current medication management.  Report to ED with any suicidal or homicidal ideations.      2. Obesity, unspecified classification, unspecified obesity type, unspecified whether serious comorbidity present  Assessment & Plan:  Increase physical activity to at least 30 minutes a day on most days of the week as tolerated.            Follow up in about 6 months (around 7/10/2023) for Depression/Anxiety. In addition to their scheduled follow up, the patient has also been instructed to follow up on as needed basis.

## 2023-01-10 NOTE — ASSESSMENT & PLAN NOTE
Stable.  Continue current medication management.  Report to ED with any suicidal or homicidal ideations.

## 2023-01-18 ENCOUNTER — PATIENT MESSAGE (OUTPATIENT)
Dept: ADMINISTRATIVE | Facility: HOSPITAL | Age: 28
End: 2023-01-18
Payer: COMMERCIAL

## 2023-01-30 PROBLEM — Z00.00 WELLNESS EXAMINATION: Status: RESOLVED | Noted: 2022-10-31 | Resolved: 2023-01-30

## 2023-02-09 ENCOUNTER — DOCUMENTATION ONLY (OUTPATIENT)
Dept: ADMINISTRATIVE | Facility: HOSPITAL | Age: 28
End: 2023-02-09
Payer: COMMERCIAL

## 2023-02-15 ENCOUNTER — HOSPITAL ENCOUNTER (EMERGENCY)
Facility: HOSPITAL | Age: 28
Discharge: HOME | End: 2023-02-15
Attending: FAMILY MEDICINE
Payer: COMMERCIAL

## 2023-02-15 VITALS
WEIGHT: 175 LBS | OXYGEN SATURATION: 100 % | BODY MASS INDEX: 32.2 KG/M2 | RESPIRATION RATE: 17 BRPM | DIASTOLIC BLOOD PRESSURE: 63 MMHG | HEART RATE: 102 BPM | HEIGHT: 62 IN | TEMPERATURE: 98 F | SYSTOLIC BLOOD PRESSURE: 120 MMHG

## 2023-02-15 DIAGNOSIS — R55 SYNCOPE, UNSPECIFIED SYNCOPE TYPE: Primary | ICD-10-CM

## 2023-02-15 DIAGNOSIS — R40.20 LOC (LOSS OF CONSCIOUSNESS): ICD-10-CM

## 2023-02-15 DIAGNOSIS — R53.1 WEAKNESS: ICD-10-CM

## 2023-02-15 LAB
ABS NEUT CALC (OHS): 8.2 X10(3)/MCL (ref 2.1–9.2)
ALBUMIN SERPL-MCNC: 4 G/DL (ref 3.5–5)
ALBUMIN/GLOB SERPL: 1.1 RATIO (ref 1.1–2)
ALP SERPL-CCNC: 51 UNIT/L (ref 40–150)
ALT SERPL-CCNC: 13 UNIT/L (ref 0–55)
AMPHET UR QL SCN: NEGATIVE
APPEARANCE UR: CLEAR
AST SERPL-CCNC: 16 UNIT/L (ref 5–34)
B-HCG SERPL QL: NEGATIVE
BACTERIA #/AREA URNS AUTO: ABNORMAL /HPF
BARBITURATE SCN PRESENT UR: NEGATIVE
BENZODIAZ UR QL SCN: NEGATIVE
BILIRUB UR QL STRIP.AUTO: NEGATIVE MG/DL
BILIRUBIN DIRECT+TOT PNL SERPL-MCNC: 0.3 MG/DL
BUN SERPL-MCNC: 13.3 MG/DL (ref 7–18.7)
CALCIUM SERPL-MCNC: 9.7 MG/DL (ref 8.4–10.2)
CANNABINOIDS UR QL SCN: NEGATIVE
CHLORIDE SERPL-SCNC: 106 MMOL/L (ref 98–107)
CO2 SERPL-SCNC: 21 MMOL/L (ref 22–29)
COCAINE UR QL SCN: NEGATIVE
COLOR UR AUTO: YELLOW
CREAT SERPL-MCNC: 0.81 MG/DL (ref 0.55–1.02)
EOSINOPHIL NFR BLD MANUAL: 0.15 X10(3)/MCL (ref 0–0.9)
EOSINOPHIL NFR BLD MANUAL: 1 % (ref 0–8)
ERYTHROCYTE [DISTWIDTH] IN BLOOD BY AUTOMATED COUNT: 13 % (ref 11.5–17)
GFR SERPLBLD CREATININE-BSD FMLA CKD-EPI: >60 MLS/MIN/1.73/M2
GLOBULIN SER-MCNC: 3.7 GM/DL (ref 2.4–3.5)
GLUCOSE SERPL-MCNC: 110 MG/DL (ref 74–100)
GLUCOSE UR QL STRIP.AUTO: NEGATIVE MG/DL
HCT VFR BLD AUTO: 39 % (ref 37–47)
HGB BLD-MCNC: 11.9 GM/DL (ref 12–16)
IMM GRANULOCYTES # BLD AUTO: 0.02 X10(3)/MCL (ref 0–0.04)
IMM GRANULOCYTES NFR BLD AUTO: 0.1 %
KETONES UR QL STRIP.AUTO: ABNORMAL MG/DL
LEUKOCYTE ESTERASE UR QL STRIP.AUTO: NEGATIVE UNIT/L
LYMPHOCYTES NFR BLD MANUAL: 39 % (ref 13–40)
LYMPHOCYTES NFR BLD MANUAL: 5.81 X10(3)/MCL
MCH RBC QN AUTO: 25.5 PG
MCHC RBC AUTO-ENTMCNC: 30.5 MG/DL (ref 33–36)
MCV RBC AUTO: 83.5 FL (ref 80–94)
MONOCYTES NFR BLD MANUAL: 0.74 X10(3)/MCL (ref 0.1–1.3)
MONOCYTES NFR BLD MANUAL: 5 % (ref 2–11)
NEUTROPHILS NFR BLD MANUAL: 55 % (ref 47–80)
NITRITE UR QL STRIP.AUTO: NEGATIVE
OPIATES UR QL SCN: NEGATIVE
PCP UR QL: NEGATIVE
PH UR STRIP.AUTO: 6.5 [PH]
PH UR: 6.5 [PH] (ref 3–11)
PLATELET # BLD AUTO: 351 X10(3)/MCL (ref 130–400)
PLATELET # BLD EST: NORMAL 10*3/UL
PMV BLD AUTO: 9.8 FL (ref 7.4–10.4)
POC CARDIAC TROPONIN I: 0 NG/ML (ref 0–0.08)
POTASSIUM SERPL-SCNC: 3.5 MMOL/L (ref 3.5–5.1)
PROT SERPL-MCNC: 7.7 GM/DL (ref 6.4–8.3)
PROT UR QL STRIP.AUTO: 30 MG/DL
RBC # BLD AUTO: 4.67 X10(6)/MCL (ref 4.2–5.4)
RBC #/AREA URNS AUTO: ABNORMAL /HPF
RBC MORPH BLD: NORMAL
RBC UR QL AUTO: NEGATIVE UNIT/L
SAMPLE: NORMAL
SODIUM SERPL-SCNC: 138 MMOL/L (ref 136–145)
SP GR UR STRIP.AUTO: >=1.03
SQUAMOUS #/AREA URNS AUTO: ABNORMAL /HPF
UROBILINOGEN UR STRIP-ACNC: 0.2 MG/DL
WBC # SPEC AUTO: 14.9 X10(3)/MCL (ref 4.5–11.5)
WBC #/AREA URNS AUTO: ABNORMAL /HPF

## 2023-02-15 PROCEDURE — 85025 COMPLETE CBC W/AUTO DIFF WBC: CPT | Performed by: FAMILY MEDICINE

## 2023-02-15 PROCEDURE — 96360 HYDRATION IV INFUSION INIT: CPT

## 2023-02-15 PROCEDURE — 93005 ELECTROCARDIOGRAM TRACING: CPT

## 2023-02-15 PROCEDURE — 93010 ELECTROCARDIOGRAM REPORT: CPT | Mod: ,,, | Performed by: INTERNAL MEDICINE

## 2023-02-15 PROCEDURE — 80307 DRUG TEST PRSMV CHEM ANLYZR: CPT | Performed by: FAMILY MEDICINE

## 2023-02-15 PROCEDURE — 81001 URINALYSIS AUTO W/SCOPE: CPT | Performed by: FAMILY MEDICINE

## 2023-02-15 PROCEDURE — 93010 EKG 12-LEAD: ICD-10-PCS | Mod: ,,, | Performed by: INTERNAL MEDICINE

## 2023-02-15 PROCEDURE — 99285 EMERGENCY DEPT VISIT HI MDM: CPT | Mod: 25

## 2023-02-15 PROCEDURE — 81025 URINE PREGNANCY TEST: CPT | Performed by: FAMILY MEDICINE

## 2023-02-15 PROCEDURE — 80053 COMPREHEN METABOLIC PANEL: CPT | Performed by: FAMILY MEDICINE

## 2023-02-15 PROCEDURE — 85007 BL SMEAR W/DIFF WBC COUNT: CPT | Performed by: FAMILY MEDICINE

## 2023-02-15 PROCEDURE — 84484 ASSAY OF TROPONIN QUANT: CPT

## 2023-02-15 PROCEDURE — 25000003 PHARM REV CODE 250: Performed by: FAMILY MEDICINE

## 2023-02-15 RX ADMIN — SODIUM CHLORIDE 1000 ML: 9 INJECTION, SOLUTION INTRAVENOUS at 03:02

## 2023-02-15 NOTE — ED PROVIDER NOTES
Encounter Date: 2/15/2023       History     Chief Complaint   Patient presents with    Loss of Consciousness     Pt was in the lab when she went to the bathroom, when pt came back she sat down became hot, when she got up to go get some water, pt became lightheaded and loss consciousness. Per lab staff pt LOC was 2-3 minutes, pt CBG was 82. Pt had given blood around 11am, pt did eat before and after blood donation.       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 went to the restroom she said when she got the restroom she felt hot sat down and get some water started feeling lightheaded and she passed out patient has no history of syncope denies being sick to on no medications that would affect her heart rate she says no history of cardiac disease      Review of patient's allergies indicates:  No Known Allergies  Past Medical History:   Diagnosis Date    Depression      Past Surgical History:   Procedure Laterality Date    ABCESS DRAINAGE       SECTION       Family History   Problem Relation Age of Onset    Atrial fibrillation Mother     Arrhythmia Mother      Social History     Tobacco Use    Smoking status: Never    Smokeless tobacco: Never   Substance Use Topics    Alcohol use: Not Currently     Comment: on occas    Drug use: Never     Review of Systems   Constitutional:  Negative for fever.   HENT:  Negative for sore throat.    Respiratory:  Negative for shortness of breath.    Cardiovascular:  Negative for chest pain.   Gastrointestinal:  Negative for nausea.   Genitourinary:  Negative for dysuria.   Musculoskeletal:  Negative for back pain.   Skin:  Negative for rash.   Neurological:  Positive for syncope. Negative for weakness.   Hematological:  Does not bruise/bleed easily.   All other systems reviewed and are negative.    Physical Exam     Initial Vitals [02/15/23 1426]   BP Pulse Resp Temp SpO2   111/79 (!) 123 20 98 °F (36.7 °C) 98 %      MAP       --         Physical Exam    Nursing note  and vitals reviewed.  Constitutional: She appears well-developed and well-nourished. She is active.   HENT:   Head: Normocephalic and atraumatic.   Nose: Nose normal.   Eyes: Conjunctivae, EOM and lids are normal. Pupils are equal, round, and reactive to light.   Neck: Trachea normal and phonation normal. Neck supple. No thyroid mass present.   Normal range of motion.  Cardiovascular:  Normal rate, regular rhythm, normal heart sounds and normal pulses.           Pulmonary/Chest: Breath sounds normal.   Abdominal: Abdomen is soft. Bowel sounds are normal.   Musculoskeletal:         General: Normal range of motion.      Cervical back: Normal range of motion and neck supple.     Neurological: She is alert and oriented to person, place, and time. She has normal strength and normal reflexes.   Skin: Skin is warm and intact.   Psychiatric: She has a normal mood and affect. Her speech is normal and behavior is normal. Judgment and thought content normal. Cognition and memory are normal.       ED Course   Procedures  Labs Reviewed   COMPREHENSIVE METABOLIC PANEL - Abnormal; Notable for the following components:       Result Value    Carbon Dioxide 21 (*)     Glucose Level 110 (*)     Globulin 3.7 (*)     All other components within normal limits   URINALYSIS, REFLEX TO URINE CULTURE - Abnormal; Notable for the following components:    Protein, UA 30 (*)     Ketones, UA Trace (*)     All other components within normal limits   CBC WITH DIFFERENTIAL - Abnormal; Notable for the following components:    WBC 14.9 (*)     Hgb 11.9 (*)     MCHC 30.5 (*)     All other components within normal limits   MANUAL DIFFERENTIAL - Abnormal; Notable for the following components:    Abs Lymp 5.811 (*)     All other components within normal limits   URINALYSIS, MICROSCOPIC - Abnormal; Notable for the following components:    Squamous Epithelial Cells, UA Few (*)     All other components within normal limits   DRUG SCREEN, URINE (BEAKER) -  Normal    Narrative:     Cut off concentrations:    Amphetamines - 1000 ng/ml  Barbiturates - 200 ng/ml  Benzodiazepine - 200 ng/ml  Cannabinoids (THC) - 50 ng/ml  Cocaine - 300 ng/ml  Fentanyl - 1.0 ng/ml  MDMA - 500 ng/ml  Opiates - 300 ng/ml   Phencyclidine (PCP) - 25 ng/ml    Specimen submitted for drug analysis and tested for pH and specific gravity in order to evaluate sample integrity. Suspect tampering if specific gravity is <1.003 and/or pH is not within the range of 4.5 - 8.0  False negatives may result form substances such as bleach added to urine.  False positives may result for the presence of a substance with similar chemical structure to the drug or its metabolite.    This test provides only a PRELIMINARY analytical test result. A more specific alternate chemical method must be used in order to obtain a confirmed analytical result. Gas chromatography/mass spectrometry (GC/MS) is the preferred confirmatory method. Other chemical confirmation methods are available. Clinical consideration and professional judgement should be applied to any drug of abuse test result, particularly when preliminary positive results are used.    Positive results will be confirmed only at the physicians request. Unconfirmed screening results are to be used only for medical purposes (treatment).        PREGNANCY TEST, URINE RAPID - Normal   CBC W/ AUTO DIFFERENTIAL    Narrative:     The following orders were created for panel order CBC auto differential.  Procedure                               Abnormality         Status                     ---------                               -----------         ------                     CBC with Differential[[id number]]        Abnormal            Final result               Manual Differential[[id number]]          Abnormal            Final result                 Please view results for these tests on the individual orders.   TROPONIN ISTAT   POCT TROPONIN     EKG Readings: (Independently  Interpreted)   Initial Reading: No STEMI. Rhythm: Sinus Tachycardia. Heart Rate: 124. Ectopy: No Ectopy. Conduction: Normal. ST Segments: Normal ST Segments. T Waves: Normal. Axis: Normal. Clinical Impression: Sinus Tachycardia     Imaging Results              CT Head Without Contrast (Final result)  Result time 02/15/23 16:06:49      Final result by Harmony Benavidez MD (02/15/23 16:06:49)                   Impression:      No acute abnormality seen      Electronically signed by: Harmony Benavidez  Date:    02/15/2023  Time:    16:06               Narrative:    EXAMINATION:  CT HEAD WITHOUT CONTRAST    CLINICAL HISTORY:  Syncope, recurrent;    TECHNIQUE:  Multiple axial images were obtained from the base of the brain to the vertex without contrast administration.  Sagittal and coronal reconstructions were performed. .Automatic exposure control  (AEC) is utilized to reduce patient radiation exposure.    COMPARISON:  None    FINDINGS:  There is no intracranial mass or lesion seen.  No hemorrhage is seen.  No infarct is seen.  The ventricles and basilar cisterns appear normal.  Brain parenchyma appears grossly unremarkable.    Posterior fossa appears normal.  The calvarium is intact.  The paranasal sinuses appear grossly unremarkable.                                       X-Ray Chest 1 View (Final result)  Result time 02/15/23 15:36:00      Final result by Ernesto Rodriguez MD (02/15/23 15:36:00)                   Impression:      No acute findings.      Electronically signed by: Ernesto Rodriguez  Date:    02/15/2023  Time:    15:36               Narrative:    EXAMINATION:  XR CHEST 1 VIEW    CLINICAL HISTORY:  Weakness    COMPARISON:  No priors    FINDINGS:  Portable frontal view of the chest was obtained. The heart is not enlarged.  Lungs are clear.  There is no pneumothorax or significant effusion.                                       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  I have ordered and independently interpreted EKG Reading(s) - see prior notes  Clinical Tests:   Lab Tests: Ordered and Reviewed  The following lab test(s) were unremarkable: CBC, BMP, Urinalysis and UPT  Radiological Study: Ordered and Reviewed  Medical Tests: Ordered and Reviewed  ED Management:   Patient is put on telemetry monitor IV fluids were given labs EKGs x-rays CT scan done syncope workup essentially came back unremarkable recommend follow up with Cardiology for Holter monitor           ED Course as of 02/15/23 1628   Wed Feb 15, 2023   1540 POC Cardiac Troponin I: 0.00 [BL]   1540 Preg Test, Ur: Negative [BL]   1540 CO2(!): 21 [BL]   1540 Glucose(!): 110 [BL]   1541 Creatinine: 0.81 [BL]   1541 Protein, UA(!): 30 [BL]   1541 Specific Gravity,UA: >=1.030 [BL]   1541 WBC(!): 14.9 [BL]   1541 Hemoglobin(!): 11.9 [BL]   1541 Hematocrit: 39.0 [BL]   1557 POC Cardiac Troponin I: 0.00 [BL]   1622  CT reviewed no acute changes x-ray reviewed no acute changes both looked at independently by myself all labs reviewed discussed all findings with patient [BL]      ED Course User Index  [BL] John Paul Andrews MD                 Clinical Impression:   Final diagnoses:  [R40.20] LOC (loss of consciousness)  [R53.1] Weakness  [R55] Syncope, unspecified syncope type (Primary)        ED Disposition  Condition    Discharge Stable          ED Prescriptions    None       Follow-up Information       Follow up With Specialties Details Why Contact Info    [facility]  [address]  [address]  [facility] [address]  [phone]               John Paul Andrews MD  02/15/23 8688

## 2023-02-16 ENCOUNTER — TELEPHONE (OUTPATIENT)
Dept: FAMILY MEDICINE | Facility: CLINIC | Age: 28
End: 2023-02-16
Payer: COMMERCIAL

## 2023-02-16 DIAGNOSIS — R55 SYNCOPE, UNSPECIFIED SYNCOPE TYPE: Primary | ICD-10-CM

## 2023-02-16 NOTE — TELEPHONE ENCOUNTER
Pt made aware.    ----- Message from CATY Butt sent at 2/16/2023  8:11 AM CST -----  Regarding: RE: med adv  Referral placed  ----- Message -----  From: Anamika Cali  Sent: 2/15/2023   4:34 PM CST  To: [facility] Staff  Subject: med adv                                          Type:  Needs Medical Advice    Who Called: Yesi  Symptoms (please be specific): irregular heart rate    How long has patient had these symptoms:  today  Pharmacy name and phone #:  [facility] in [place]  Would the patient rather a call back or a response via [facility]?   Best Call Back Number: [phone]  Additional Information: Patient is needing a cardiologist referral recommended from ER doctor. Please call patient back.

## 2023-04-18 ENCOUNTER — TELEPHONE (OUTPATIENT)
Dept: FAMILY MEDICINE | Facility: CLINIC | Age: 28
End: 2023-04-18
Payer: COMMERCIAL

## 2023-04-18 ENCOUNTER — LAB VISIT (OUTPATIENT)
Dept: LAB | Facility: HOSPITAL | Age: 28
End: 2023-04-18
Attending: NURSE PRACTITIONER
Payer: COMMERCIAL

## 2023-04-18 DIAGNOSIS — R35.0 URINARY FREQUENCY: Primary | ICD-10-CM

## 2023-04-18 DIAGNOSIS — R35.0 URINARY FREQUENCY: ICD-10-CM

## 2023-04-18 LAB
APPEARANCE UR: CLEAR
BACTERIA #/AREA URNS AUTO: ABNORMAL /HPF
BILIRUB UR QL STRIP.AUTO: NEGATIVE MG/DL
COLOR UR AUTO: YELLOW
GLUCOSE UR QL STRIP.AUTO: NEGATIVE MG/DL
KETONES UR QL STRIP.AUTO: NEGATIVE MG/DL
LEUKOCYTE ESTERASE UR QL STRIP.AUTO: NEGATIVE UNIT/L
MUCOUS THREADS URNS QL MICRO: ABNORMAL /LPF
NITRITE UR QL STRIP.AUTO: NEGATIVE
PH UR STRIP.AUTO: 6 [PH]
PROT UR QL STRIP.AUTO: NEGATIVE MG/DL
RBC #/AREA URNS AUTO: ABNORMAL /HPF
RBC UR QL AUTO: NEGATIVE UNIT/L
SP GR UR STRIP.AUTO: >=1.03
SQUAMOUS #/AREA URNS AUTO: ABNORMAL /HPF
UROBILINOGEN UR STRIP-ACNC: 0.2 MG/DL
WBC #/AREA URNS AUTO: ABNORMAL /HPF

## 2023-04-18 NOTE — PROGRESS NOTES
Labs reviewed. All within acceptable ranges for age and conditions. No change in treatment needed at this time.

## 2023-04-18 NOTE — TELEPHONE ENCOUNTER
Pt made aware UA ordered and she will be contacted pending results. UV.    ----- Message from CATY Butt sent at 4/18/2023 11:02 AM CDT -----  Regarding: RE: med adv  Done    ----- Message -----  From: Anamika Cali  Sent: 4/18/2023  10:20 AM CDT  To: [facility] Staff  Subject: med adv                                          Type:  Needs Medical Advice    Who Called: Yesi  Symptoms (please be specific): possible UTI   How long has patient had these symptoms:  frequent urination  Pharmacy name and phone #:  [facility] in [place]  Would the patient rather a call back or a response via [facility]?   Best Call Back Number: [phone]  Additional Information: patient would like a UA order sent. Please call patient back.

## 2023-04-18 NOTE — TELEPHONE ENCOUNTER
Pt made aware of results, understanding voiced.    ----- Message from CATY Butt sent at 4/18/2023  2:22 PM CDT -----  Labs reviewed. All within acceptable ranges for age and conditions. No change in treatment needed at this time.

## 2023-05-08 ENCOUNTER — LAB VISIT (OUTPATIENT)
Dept: LAB | Facility: HOSPITAL | Age: 28
End: 2023-05-08
Attending: INTERNAL MEDICINE
Payer: COMMERCIAL

## 2023-05-08 DIAGNOSIS — R06.09 DYSPNEA ON EXERTION: Primary | ICD-10-CM

## 2023-05-08 DIAGNOSIS — R55 SYNCOPE AND COLLAPSE: ICD-10-CM

## 2023-05-08 LAB — FERRITIN SERPL-MCNC: 12.5 NG/ML (ref 4.63–204)

## 2023-05-08 PROCEDURE — 82728 ASSAY OF FERRITIN: CPT

## 2023-05-08 PROCEDURE — 36415 COLL VENOUS BLD VENIPUNCTURE: CPT

## 2023-05-08 PROCEDURE — 82525 ASSAY OF COPPER: CPT | Mod: 90

## 2023-05-09 LAB — COPPER SERPL-MCNC: 149 MCG/DL (ref 77–206)

## 2023-06-13 ENCOUNTER — PATIENT MESSAGE (OUTPATIENT)
Dept: RESEARCH | Facility: HOSPITAL | Age: 28
End: 2023-06-13
Payer: COMMERCIAL

## 2023-06-27 ENCOUNTER — PATIENT MESSAGE (OUTPATIENT)
Dept: RESEARCH | Facility: HOSPITAL | Age: 28
End: 2023-06-27
Payer: COMMERCIAL

## 2023-07-05 ENCOUNTER — PATIENT MESSAGE (OUTPATIENT)
Dept: RESEARCH | Facility: HOSPITAL | Age: 28
End: 2023-07-05
Payer: COMMERCIAL

## 2023-07-11 ENCOUNTER — PATIENT MESSAGE (OUTPATIENT)
Dept: RESEARCH | Facility: HOSPITAL | Age: 28
End: 2023-07-11
Payer: COMMERCIAL

## 2023-07-12 ENCOUNTER — OFFICE VISIT (OUTPATIENT)
Dept: FAMILY MEDICINE | Facility: CLINIC | Age: 28
End: 2023-07-12
Payer: COMMERCIAL

## 2023-07-12 VITALS
OXYGEN SATURATION: 97 % | WEIGHT: 186.81 LBS | SYSTOLIC BLOOD PRESSURE: 109 MMHG | TEMPERATURE: 98 F | DIASTOLIC BLOOD PRESSURE: 71 MMHG | HEART RATE: 81 BPM | BODY MASS INDEX: 34.17 KG/M2

## 2023-07-12 DIAGNOSIS — Z00.00 WELL ADULT EXAM: ICD-10-CM

## 2023-07-12 DIAGNOSIS — F32.A DEPRESSION, UNSPECIFIED DEPRESSION TYPE: Primary | ICD-10-CM

## 2023-07-12 PROCEDURE — 99212 OFFICE O/P EST SF 10 MIN: CPT | Mod: ,,, | Performed by: NURSE PRACTITIONER

## 2023-07-12 PROCEDURE — 1160F PR REVIEW ALL MEDS BY PRESCRIBER/CLIN PHARMACIST DOCUMENTED: ICD-10-PCS | Mod: CPTII,,, | Performed by: NURSE PRACTITIONER

## 2023-07-12 PROCEDURE — 3074F SYST BP LT 130 MM HG: CPT | Mod: CPTII,,, | Performed by: NURSE PRACTITIONER

## 2023-07-12 PROCEDURE — 1160F RVW MEDS BY RX/DR IN RCRD: CPT | Mod: CPTII,,, | Performed by: NURSE PRACTITIONER

## 2023-07-12 PROCEDURE — 3074F PR MOST RECENT SYSTOLIC BLOOD PRESSURE < 130 MM HG: ICD-10-PCS | Mod: CPTII,,, | Performed by: NURSE PRACTITIONER

## 2023-07-12 PROCEDURE — 1159F MED LIST DOCD IN RCRD: CPT | Mod: CPTII,,, | Performed by: NURSE PRACTITIONER

## 2023-07-12 PROCEDURE — 1159F PR MEDICATION LIST DOCUMENTED IN MEDICAL RECORD: ICD-10-PCS | Mod: CPTII,,, | Performed by: NURSE PRACTITIONER

## 2023-07-12 PROCEDURE — 99212 PR OFFICE/OUTPT VISIT, EST, LEVL II, 10-19 MIN: ICD-10-PCS | Mod: ,,, | Performed by: NURSE PRACTITIONER

## 2023-07-12 PROCEDURE — 3078F PR MOST RECENT DIASTOLIC BLOOD PRESSURE < 80 MM HG: ICD-10-PCS | Mod: CPTII,,, | Performed by: NURSE PRACTITIONER

## 2023-07-12 PROCEDURE — 3008F BODY MASS INDEX DOCD: CPT | Mod: CPTII,,, | Performed by: NURSE PRACTITIONER

## 2023-07-12 PROCEDURE — 3008F PR BODY MASS INDEX (BMI) DOCUMENTED: ICD-10-PCS | Mod: CPTII,,, | Performed by: NURSE PRACTITIONER

## 2023-07-12 PROCEDURE — 3078F DIAST BP <80 MM HG: CPT | Mod: CPTII,,, | Performed by: NURSE PRACTITIONER

## 2023-07-12 NOTE — PROGRESS NOTES
Patient ID: [id number]     Chief Complaint: Follow-up (Depression she took for about a couple months and she just stopped. She feels she don't need it .)      HPI:     Yesi Porter is a 27 y.o. female here today for a follow up.   Reports depression has resolved.  Denies any suicidal or homicidal ideations. No other complaints today.      Past Medical History:  has a past medical history of Depression.    Surgical History:  has a past surgical history that includes  section and Abscess drainage.    Family History: family history includes Arrhythmia in her mother; Atrial fibrillation in her mother.    Social History:  reports that she has never smoked. She has never used smokeless tobacco. She reports that she does not currently use alcohol. She reports that she does not use drugs.    No current outpatient medications      Patient has No Known Allergies.     Patient Care Team:  CATY Butt as PCP - General (Nurse Practitioner)  Harini Carmona LPN as Care Coordinator       Subjective:     Review of Systems    12 point review of systems conducted, negative except as stated in the history of present illness. See HPI for details.      Objective:     Visit Vitals  /71 (BP Location: Right arm)   Pulse 81   Temp 98.1 °F (36.7 °C)   Wt 84.7 kg (186 lb 12.8 oz)   SpO2 97%   BMI 34.17 kg/m²       Physical Exam    General: Alert and oriented, No acute distress.  Head: Normocephalic, Atraumatic.  Eye: Pupils are equal, round and reactive to light, Extraocular movements are intact, Sclera non-icteric.  Ears/Nose/Throat: Normal, Mucosa moist,Clear.  Neck/Thyroid: Supple, Non-tender, No carotid bruit, No lymphadenopathy, No JVD, Full range of motion.  Respiratory: Clear to auscultation bilaterally; No wheezes, rales or rhonchi,Non-labored respirations, Symmetrical chest wall expansion.  Cardiovascular: Regular rate and rhythm, S1/S2 normal, No murmurs, rubs or gallops.  Gastrointestinal: Soft,  Non-tender, Non-distended, Normal bowel sounds, No palpable organomegaly.  Musculoskeletal: Normal range of motion.  Integumentary: Warm, Dry, Intact, No suspicious lesions or rashes.  Extremities: No clubbing, cyanosis or edema  Neurologic: No focal deficits, Cranial Nerves II-XII are grossly intact, Motor strength normal upper and lower extremities, Sensory exam intact.  Psychiatric: Normal interaction, Coherent speech, Euthymic mood, Appropriate affect     Labs Reviewed:     Chemistry:  Lab Results   Component Value Date     02/15/2023    K 3.5 02/15/2023    CHLORIDE 106 02/15/2023    BUN 13.3 02/15/2023    CREATININE 0.81 02/15/2023    EGFRNORACEVR >60 02/15/2023    GLUCOSE 110 (H) 02/15/2023    CALCIUM 9.7 02/15/2023    ALKPHOS 51 02/15/2023    LABPROT 7.7 02/15/2023    ALBUMIN 4.0 02/15/2023    BILIDIR 0.10 11/08/2018    IBILI 0.20 11/08/2018    AST 16 02/15/2023    ALT 13 02/15/2023    [id number] 8.8 (L) 10/31/2022    TSH 1.3530 10/31/2022    [id number] 1.05 08/24/2020        Lab Results   Component Value Date    HGBA1C 5.0 10/31/2022        Hematology:  Lab Results   Component Value Date    WBC 14.9 (H) 02/15/2023    HGB 11.9 (L) 02/15/2023    HCT 39.0 02/15/2023     02/15/2023       Lipid Panel:  Lab Results   Component Value Date    CHOL 134 10/31/2022    HDL 46 10/31/2022    LDL 78.00 10/31/2022    TRIG 51 10/31/2022    TOTALCHOLEST 3 10/31/2022        Urine:  Lab Results   Component Value Date    COLORUA Yellow 04/18/2023    APPEARANCEUA Clear 04/18/2023    SGUA >=1.030 04/18/2023    PHUA 6.0 04/18/2023    PROTEINUA Negative 04/18/2023    GLUCOSEUA Negative 04/18/2023    KETONESUA Negative 04/18/2023    BLOODUA Negative 04/18/2023    NITRITESUA Negative 04/18/2023    LEUKOCYTESUR Negative 04/18/2023    RBCUA None Seen 04/18/2023    WBCUA None Seen 04/18/2023    BACTERIA Few (A) 04/18/2023    SQEPUA 2-20 12/14/2017    HYALINECASTS 0-2 (A) 12/14/2017          Assessment:       ICD-10-CM  ICD-9-CM   1. Well adult exam  Z00.00 V70.0   2. Depression, unspecified depression type  F32.A 311        Plan:   1. Depression, unspecified depression type  Resolved.  Report to ED with any suicidal or homicidal ideations.    2. Well adult exam  Return to clinic in 3 months for wellness exam.    - CBC Auto Differential; Future  - Comprehensive Metabolic Panel; Future  - Lipid Panel; Future  - TSH; Future  - Hemoglobin A1C; Future  - Urinalysis; Future  - Vitamin D; Future  - T4, Free; Future  - Urinalysis      Follow up in about 3 months (around 10/12/2023) for Wellness. In addition to their scheduled follow up, the patient has also been instructed to follow up on as needed basis.     Future Appointments   Date Time Provider Department Center   11/1/2023 10:15 AM CATY Butt [facility]   5/8/2024  9:30 AM Tino Guzman MD [facility]        CATY Butt

## 2023-07-19 ENCOUNTER — PATIENT MESSAGE (OUTPATIENT)
Dept: RESEARCH | Facility: HOSPITAL | Age: 28
End: 2023-07-19
Payer: COMMERCIAL

## 2023-07-24 ENCOUNTER — PATIENT MESSAGE (OUTPATIENT)
Dept: RESEARCH | Facility: HOSPITAL | Age: 28
End: 2023-07-24
Payer: COMMERCIAL

## 2023-10-27 ENCOUNTER — LAB VISIT (OUTPATIENT)
Dept: LAB | Facility: HOSPITAL | Age: 28
End: 2023-10-27
Attending: NURSE PRACTITIONER
Payer: COMMERCIAL

## 2023-10-27 DIAGNOSIS — Z00.00 WELL ADULT EXAM: ICD-10-CM

## 2023-10-27 LAB
ALBUMIN SERPL-MCNC: 3.7 G/DL (ref 3.5–5)
ALBUMIN/GLOB SERPL: 1.1 RATIO (ref 1.1–2)
ALP SERPL-CCNC: 54 UNIT/L (ref 40–150)
ALT SERPL-CCNC: 19 UNIT/L (ref 0–55)
APPEARANCE UR: CLEAR
AST SERPL-CCNC: 16 UNIT/L (ref 5–34)
BACTERIA #/AREA URNS AUTO: ABNORMAL /HPF
BASOPHILS # BLD AUTO: 0.04 X10(3)/MCL
BASOPHILS NFR BLD AUTO: 0.4 %
BILIRUB SERPL-MCNC: 0.3 MG/DL
BILIRUB UR QL STRIP.AUTO: NEGATIVE
BUN SERPL-MCNC: 10.7 MG/DL (ref 7–18.7)
CALCIUM SERPL-MCNC: 8.9 MG/DL (ref 8.4–10.2)
CHLORIDE SERPL-SCNC: 110 MMOL/L (ref 98–107)
CHOLEST SERPL-MCNC: 125 MG/DL
CHOLEST/HDLC SERPL: 3 {RATIO} (ref 0–5)
CO2 SERPL-SCNC: 21 MMOL/L (ref 22–29)
COLOR UR AUTO: YELLOW
CREAT SERPL-MCNC: 0.67 MG/DL (ref 0.55–1.02)
DEPRECATED CALCIDIOL+CALCIFEROL SERPL-MC: 12.6 NG/ML (ref 30–80)
EOSINOPHIL # BLD AUTO: 0.26 X10(3)/MCL (ref 0–0.9)
EOSINOPHIL NFR BLD AUTO: 2.8 %
ERYTHROCYTE [DISTWIDTH] IN BLOOD BY AUTOMATED COUNT: 13.8 % (ref 11.5–17)
EST. AVERAGE GLUCOSE BLD GHB EST-MCNC: 99.7 MG/DL
GFR SERPLBLD CREATININE-BSD FMLA CKD-EPI: >60 MLS/MIN/1.73/M2
GLOBULIN SER-MCNC: 3.5 GM/DL (ref 2.4–3.5)
GLUCOSE SERPL-MCNC: 95 MG/DL (ref 74–100)
GLUCOSE UR QL STRIP.AUTO: NEGATIVE
HBA1C MFR BLD: 5.1 %
HCT VFR BLD AUTO: 38.4 % (ref 37–47)
HDLC SERPL-MCNC: 47 MG/DL (ref 35–60)
HGB BLD-MCNC: 11.3 G/DL (ref 12–16)
IMM GRANULOCYTES # BLD AUTO: 0.01 X10(3)/MCL (ref 0–0.04)
IMM GRANULOCYTES NFR BLD AUTO: 0.1 %
KETONES UR QL STRIP.AUTO: NEGATIVE
LDLC SERPL CALC-MCNC: 70 MG/DL (ref 50–140)
LEUKOCYTE ESTERASE UR QL STRIP.AUTO: NEGATIVE
LYMPHOCYTES # BLD AUTO: 3.17 X10(3)/MCL (ref 0.6–4.6)
LYMPHOCYTES NFR BLD AUTO: 33.5 %
MCH RBC QN AUTO: 25 PG (ref 27–31)
MCHC RBC AUTO-ENTMCNC: 29.4 G/DL (ref 33–36)
MCV RBC AUTO: 85 FL (ref 80–94)
MONOCYTES # BLD AUTO: 0.49 X10(3)/MCL (ref 0.1–1.3)
MONOCYTES NFR BLD AUTO: 5.2 %
NEUTROPHILS # BLD AUTO: 5.48 X10(3)/MCL (ref 2.1–9.2)
NEUTROPHILS NFR BLD AUTO: 58 %
NITRITE UR QL STRIP.AUTO: NEGATIVE
PH UR STRIP.AUTO: 6.5 [PH]
PLATELET # BLD AUTO: 345 X10(3)/MCL (ref 130–400)
PMV BLD AUTO: 9.9 FL (ref 7.4–10.4)
POTASSIUM SERPL-SCNC: 4 MMOL/L (ref 3.5–5.1)
PROT SERPL-MCNC: 7.2 GM/DL (ref 6.4–8.3)
PROT UR QL STRIP.AUTO: NEGATIVE
RBC # BLD AUTO: 4.52 X10(6)/MCL (ref 4.2–5.4)
RBC #/AREA URNS AUTO: ABNORMAL /HPF
RBC UR QL AUTO: NEGATIVE
SODIUM SERPL-SCNC: 139 MMOL/L (ref 136–145)
SP GR UR STRIP.AUTO: 1.02 (ref 1–1.03)
SQUAMOUS #/AREA URNS AUTO: ABNORMAL /HPF
T4 FREE SERPL-MCNC: 0.85 NG/DL (ref 0.7–1.48)
TRIGL SERPL-MCNC: 41 MG/DL (ref 37–140)
TSH SERPL-ACNC: 0.94 UIU/ML (ref 0.35–4.94)
UROBILINOGEN UR STRIP-ACNC: 1
VLDLC SERPL CALC-MCNC: 8 MG/DL
WBC # SPEC AUTO: 9.45 X10(3)/MCL (ref 4.5–11.5)
WBC #/AREA URNS AUTO: ABNORMAL /HPF

## 2023-10-27 PROCEDURE — 84443 ASSAY THYROID STIM HORMONE: CPT

## 2023-10-27 PROCEDURE — 84439 ASSAY OF FREE THYROXINE: CPT

## 2023-10-27 PROCEDURE — 83036 HEMOGLOBIN GLYCOSYLATED A1C: CPT

## 2023-10-27 PROCEDURE — 82306 VITAMIN D 25 HYDROXY: CPT

## 2023-10-27 PROCEDURE — 80061 LIPID PANEL: CPT

## 2023-10-27 PROCEDURE — 80053 COMPREHEN METABOLIC PANEL: CPT

## 2023-10-27 PROCEDURE — 36415 COLL VENOUS BLD VENIPUNCTURE: CPT

## 2023-10-27 PROCEDURE — 85025 COMPLETE CBC W/AUTO DIFF WBC: CPT

## 2023-11-01 ENCOUNTER — HOSPITAL ENCOUNTER (OUTPATIENT)
Dept: RADIOLOGY | Facility: HOSPITAL | Age: 28
Discharge: HOME | End: 2023-11-01
Attending: NURSE PRACTITIONER
Payer: COMMERCIAL

## 2023-11-01 ENCOUNTER — OFFICE VISIT (OUTPATIENT)
Dept: FAMILY MEDICINE | Facility: CLINIC | Age: 28
End: 2023-11-01
Payer: COMMERCIAL

## 2023-11-01 VITALS
BODY MASS INDEX: 34.56 KG/M2 | WEIGHT: 187.81 LBS | SYSTOLIC BLOOD PRESSURE: 103 MMHG | OXYGEN SATURATION: 98 % | TEMPERATURE: 98 F | HEART RATE: 82 BPM | DIASTOLIC BLOOD PRESSURE: 65 MMHG | HEIGHT: 62 IN | RESPIRATION RATE: 18 BRPM

## 2023-11-01 DIAGNOSIS — M25.561 CHRONIC PAIN OF RIGHT KNEE: ICD-10-CM

## 2023-11-01 DIAGNOSIS — Z00.00 WELL ADULT EXAM: Primary | ICD-10-CM

## 2023-11-01 DIAGNOSIS — G89.29 CHRONIC PAIN OF RIGHT KNEE: ICD-10-CM

## 2023-11-01 DIAGNOSIS — E66.9 OBESITY, UNSPECIFIED CLASSIFICATION, UNSPECIFIED OBESITY TYPE, UNSPECIFIED WHETHER SERIOUS COMORBIDITY PRESENT: ICD-10-CM

## 2023-11-01 DIAGNOSIS — F41.9 ANXIETY: ICD-10-CM

## 2023-11-01 PROCEDURE — 1160F RVW MEDS BY RX/DR IN RCRD: CPT | Mod: CPTII,,, | Performed by: NURSE PRACTITIONER

## 2023-11-01 PROCEDURE — 3078F PR MOST RECENT DIASTOLIC BLOOD PRESSURE < 80 MM HG: ICD-10-PCS | Mod: CPTII,,, | Performed by: NURSE PRACTITIONER

## 2023-11-01 PROCEDURE — 3044F PR MOST RECENT HEMOGLOBIN A1C LEVEL <7.0%: ICD-10-PCS | Mod: CPTII,,, | Performed by: NURSE PRACTITIONER

## 2023-11-01 PROCEDURE — 1159F MED LIST DOCD IN RCRD: CPT | Mod: CPTII,,, | Performed by: NURSE PRACTITIONER

## 2023-11-01 PROCEDURE — 3008F PR BODY MASS INDEX (BMI) DOCUMENTED: ICD-10-PCS | Mod: CPTII,,, | Performed by: NURSE PRACTITIONER

## 2023-11-01 PROCEDURE — 1160F PR REVIEW ALL MEDS BY PRESCRIBER/CLIN PHARMACIST DOCUMENTED: ICD-10-PCS | Mod: CPTII,,, | Performed by: NURSE PRACTITIONER

## 2023-11-01 PROCEDURE — 99395 PR PREVENTIVE VISIT,EST,18-39: ICD-10-PCS | Mod: ,,, | Performed by: NURSE PRACTITIONER

## 2023-11-01 PROCEDURE — 3044F HG A1C LEVEL LT 7.0%: CPT | Mod: CPTII,,, | Performed by: NURSE PRACTITIONER

## 2023-11-01 PROCEDURE — 99395 PREV VISIT EST AGE 18-39: CPT | Mod: ,,, | Performed by: NURSE PRACTITIONER

## 2023-11-01 PROCEDURE — 3074F PR MOST RECENT SYSTOLIC BLOOD PRESSURE < 130 MM HG: ICD-10-PCS | Mod: CPTII,,, | Performed by: NURSE PRACTITIONER

## 2023-11-01 PROCEDURE — 3074F SYST BP LT 130 MM HG: CPT | Mod: CPTII,,, | Performed by: NURSE PRACTITIONER

## 2023-11-01 PROCEDURE — 3078F DIAST BP <80 MM HG: CPT | Mod: CPTII,,, | Performed by: NURSE PRACTITIONER

## 2023-11-01 PROCEDURE — 73562 X-RAY EXAM OF KNEE 3: CPT | Mod: TC,RT

## 2023-11-01 PROCEDURE — 3008F BODY MASS INDEX DOCD: CPT | Mod: CPTII,,, | Performed by: NURSE PRACTITIONER

## 2023-11-01 PROCEDURE — 1159F PR MEDICATION LIST DOCUMENTED IN MEDICAL RECORD: ICD-10-PCS | Mod: CPTII,,, | Performed by: NURSE PRACTITIONER

## 2023-11-01 RX ORDER — ASPIRIN 325 MG
50000 TABLET, DELAYED RELEASE (ENTERIC COATED) ORAL WEEKLY
Qty: 12 CAPSULE | Refills: 0 | Status: SHIPPED | OUTPATIENT
Start: 2023-11-01 | End: 2024-01-18

## 2023-11-01 RX ORDER — HYDROXYZINE PAMOATE 25 MG/1
25 CAPSULE ORAL NIGHTLY
Qty: 30 CAPSULE | Refills: 6 | Status: SHIPPED | OUTPATIENT
Start: 2023-11-01 | End: 2023-12-13 | Stop reason: SDUPTHER

## 2023-11-01 NOTE — PATIENT INSTRUCTIONS
Hi Yesi,     If you are due for any health screening(s) below please notify me so we can arrange them to be ordered and scheduled. Most healthy patients at your age complete them, but you are free to accept or refuse.     If you can't do it, I'll definitely understand. If you can, I'd certainly appreciate it!    All of your core healthy metrics are met.

## 2023-11-01 NOTE — PROGRESS NOTES
Patient ID: [id number]     Chief Complaint: Annual Exam      HPI:     Yesi Porter is a 28 y.o. female here today for an annual wellness visit.  Patient also presents with complaints of anxiety.  Denies any suicidal homicidal ideations.  Denies any known triggers.      Patient also presents with complaints of chronic right knee pain.  Patient reports a chicken skewer punctured her right knee years ago and she has pain off and on.  Patient rates pain 5/10.  Patient describes pain as a throbbing sensation.  Pain is worse with standing, relieved with sitting.    Health Maintenance   Topic Date Due    Pap Smear  2024    TETANUS VACCINE  2028    Hepatitis C Screening  Completed    Lipid Panel  Completed     Dental Exam - Recommend biannually  Vaccinations -   Immunization History   Administered Date(s) Administered    COVID-19, MRNA, LN-S, PF (Pfizer) (Purple Cap) 2021, 2021    DTP 1996, 1996, 1997, 1997, 2000    HIB 1996, 1996, 1997, 1997    Hepatitis B, Pediatric/Adolescent 1996, 1996, 1997    Influenza - Quadrivalent - PF *Preferred* (6 months and older) 2022, 10/31/2022    MMR 1997, 2000    Meningococcal Conjugate (MCV4P) 2009, 2012    OPV 1996, 1996, 1997, 2000    Tdap 2009, 2016, 2018    Varicella 1997, 2009        Past Medical History:  has a past medical history of Depression.    Surgical History:  has a past surgical history that includes  section and Abscess drainage.    Family History: family history includes Arrhythmia in her mother; Atrial fibrillation in her mother; Liver cancer in her maternal grandfather; Lung cancer in her paternal grandmother.    Social History:  reports that she has never smoked. She has never used smokeless tobacco. She reports that she does not currently use alcohol. She reports that she does not  "use drugs.    Current Outpatient Medications   Medication Instructions    cholecalciferol (vitamin D3) 50,000 Units, Oral, Weekly    hydrOXYzine pamoate (VISTARIL) 25 mg, Oral, Nightly       Patient has No Known Allergies.     Patient Care Team:  Mitzi Dow FNP as PCP - General (Nurse Practitioner)  Harini Carmona LPN as Care Coordinator       Subjective:     Review of Systems    12 point review of systems conducted, negative except as stated in the history of present illness. See HPI for details.      Objective:     Visit Vitals  /65 (BP Location: Left arm, Patient Position: Sitting)   Pulse 82   Temp 98.1 °F (36.7 °C) (Oral)   Resp 18   Ht 5' 2" (1.575 m)   Wt 85.2 kg (187 lb 12.8 oz)   LMP 10/16/2023 (Approximate)   SpO2 98%   BMI 34.35 kg/m²       Physical Exam    General: Alert and oriented, No acute distress.  Head: Normocephalic, Atraumatic.  Eye: Pupils are equal, round and reactive to light, Extraocular movements are intact, Sclera non-icteric.  Ears/Nose/Throat: Normal, Mucosa moist,Clear.  Neck/Thyroid: Supple, Non-tender, No carotid bruit, No lymphadenopathy, No JVD, Full range of motion.  Respiratory: Clear to auscultation bilaterally; No wheezes, rales or rhonchi,Non-labored respirations, Symmetrical chest wall expansion.  Cardiovascular: Regular rate and rhythm, S1/S2 normal, No murmurs, rubs or gallops.  Gastrointestinal: Soft, Non-tender, Non-distended, Normal bowel sounds, No palpable organomegaly.  Musculoskeletal: Normal range of motion.  Integumentary: Warm, Dry, Intact, No suspicious lesions or rashes.  Extremities: No clubbing, cyanosis or edema  Neurologic: No focal deficits, Cranial Nerves II-XII are grossly intact, Motor strength normal upper and lower extremities, Sensory exam intact.  Psychiatric: Normal interaction, Coherent speech, Euthymic mood, Appropriate affect     Labs Reviewed:     Chemistry:  Lab Results   Component Value Date     10/27/2023    K 4.0 " 10/27/2023    CHLORIDE 110 (H) 10/27/2023    BUN 10.7 10/27/2023    CREATININE 0.67 10/27/2023    EGFRNORACEVR >60 10/27/2023    GLUCOSE 95 10/27/2023    CALCIUM 8.9 10/27/2023    ALKPHOS 54 10/27/2023    LABPROT 7.2 10/27/2023    ALBUMIN 3.7 10/27/2023    BILIDIR 0.10 11/08/2018    IBILI 0.20 11/08/2018    AST 16 10/27/2023    ALT 19 10/27/2023    [id number] 12.6 (L) 10/27/2023    TSH 0.944 10/27/2023    [id number] 0.85 10/27/2023        Lab Results   Component Value Date    HGBA1C 5.1 10/27/2023        Hematology:  Lab Results   Component Value Date    WBC 9.45 10/27/2023    HGB 11.3 (L) 10/27/2023    HCT 38.4 10/27/2023     10/27/2023       Lipid Panel:  Lab Results   Component Value Date    CHOL 125 10/27/2023    HDL 47 10/27/2023    LDL 70.00 10/27/2023    TRIG 41 10/27/2023    TOTALCHOLEST 3 10/27/2023        Urine:  Lab Results   Component Value Date    COLORUA Yellow 10/27/2023    APPEARANCEUA Clear 10/27/2023    SGUA 1.025 10/27/2023    PHUA 6.5 10/27/2023    PROTEINUA Negative 10/27/2023    GLUCOSEUA Negative 10/27/2023    KETONESUA Negative 10/27/2023    BLOODUA Negative 10/27/2023    NITRITESUA Negative 10/27/2023    LEUKOCYTESUR Negative 10/27/2023    RBCUA None Seen 10/27/2023    WBCUA None Seen 10/27/2023    BACTERIA NEG 10/27/2023    SQEPUA 2-20 12/14/2017    HYALINECASTS 0-2 (A) 12/14/2017          Assessment:       ICD-10-CM ICD-9-CM   1. Well adult exam  Z00.00 V70.0   2. Anxiety  F41.9 300.00   3. Chronic pain of right knee  M25.561 719.46    G89.29 338.29   4. Obesity, unspecified classification, unspecified obesity type, unspecified whether serious comorbidity present  E66.9 278.00        Plan:   1. Well adult exam  Healthy lifestyle discussed.  Positive support system encouraged.    2. Anxiety  Not at goal.  Start hydroxyzine 25 mg p.o. at bedtime.  Practice deep breathing or abdominal breathing exercises when anxiety occurs.  Exercise daily. Get sunlight daily.  Avoid caffeine,  alcohol and stimulants.  Practice positive phrases and repeat throughout the day, along with yoga and relaxation techniques.  Set healthy boundaries, avoid people and conversations that increase stress.  Educated patient on the risks of serotonin based medications such as serotonin modulators and SSRIs/SNRIs including common side effects of nausea, GI upset, headache dizziness as well as the rare risk for worsening symptoms of depression including development of suicidal thoughts or ideations, and serotonin syndrome.   Discussed benefits of medication not becoming noticeable until up to 6 weeks from start date.   Reports any symptoms of suicidal or homicidal ideations immediately, if clinic is closed go to nearest emergency room.    3. Chronic pain of right knee  - X-Ray Knee 3 View Right; Future    4. Obesity, unspecified classification, unspecified obesity type, unspecified whether serious comorbidity present  Body mass index is 34.35 kg/m².  Goal BMI <30.  Exercise 5 times a week for 30 minutes per day.  Avoid soda, simple sugars, excessive rice, potatoes or bread. Limit fast foods and fried foods.  Choose complex carbs in moderation (example: green vegetables, beans, oatmeal). Eat plenty of fresh fruits and vegetables with lean meats daily.  Do not skip meals. Eat a balanced portion size.  Avoid fad diets. Consider permanent healthy life style changes.         Follow up in about 4 weeks (around 11/29/2023) for Anxiety/Depression, Virtual Visit. In addition to their scheduled follow up, the patient has also been instructed to follow up on as needed basis.     Future Appointments   Date Time Provider Department Center   12/14/2023 10:15 AM Mitzi Dow FNP [facility]   5/8/2024  9:30 AM Tino Guzman MD [facility]        CATY Butt

## 2023-11-03 ENCOUNTER — TELEPHONE (OUTPATIENT)
Dept: FAMILY MEDICINE | Facility: CLINIC | Age: 28
End: 2023-11-03
Payer: COMMERCIAL

## 2023-11-06 DIAGNOSIS — G89.29 CHRONIC PAIN OF RIGHT KNEE: Primary | ICD-10-CM

## 2023-11-06 DIAGNOSIS — M25.561 CHRONIC PAIN OF RIGHT KNEE: Primary | ICD-10-CM

## 2023-11-06 PROBLEM — F41.9 ANXIETY: Status: ACTIVE | Noted: 2023-11-06

## 2023-11-30 ENCOUNTER — CLINICAL SUPPORT (OUTPATIENT)
Dept: REHABILITATION | Facility: HOSPITAL | Age: 28
End: 2023-11-30
Attending: NURSE PRACTITIONER
Payer: COMMERCIAL

## 2023-11-30 DIAGNOSIS — M62.81 MUSCLE WEAKNESS OF LOWER EXTREMITY: ICD-10-CM

## 2023-11-30 DIAGNOSIS — Z74.09 IMPAIRED FUNCTIONAL MOBILITY, BALANCE, AND ENDURANCE: ICD-10-CM

## 2023-11-30 DIAGNOSIS — M25.561 CHRONIC PAIN OF RIGHT KNEE: Primary | ICD-10-CM

## 2023-11-30 DIAGNOSIS — R29.3 POSTURAL IMBALANCE: ICD-10-CM

## 2023-11-30 DIAGNOSIS — G89.29 CHRONIC PAIN OF RIGHT KNEE: Primary | ICD-10-CM

## 2023-11-30 PROCEDURE — 97110 THERAPEUTIC EXERCISES: CPT

## 2023-11-30 PROCEDURE — 97162 PT EVAL MOD COMPLEX 30 MIN: CPT

## 2023-11-30 NOTE — PLAN OF CARE
"[facility]   Physical Therapy Initial Evaluation      Name: Yesi Porter  [facility] Number: [id number]    Therapy Diagnosis:   Encounter Diagnoses   Name Primary?    Chronic pain of right knee Yes    Muscle weakness of lower extremity     Postural imbalance     Impaired functional mobility, balance, and endurance         Physician: Mitzi Dow F*    Physician Orders: PT Eval and Treat   Medical Diagnosis from Referral: M25.561 chronic pain of R knee  Evaluation Date: 11/30/2023  Authorization Period Expiration: TBD  Plan of Care Expiration: TBD  Progress Note Due: 12/29/2023    Visit # / Visits authorized: 1/ 1   FOTO: 1/ 3 (25)    Precautions: Standard     Time In: 1110   Time Out: 1200  Total Billable Time: 50 minutes    Subjective     Date of onset: about 6 months ago    History of current condition - Yesi reports: she originally injured her R knee in 2018 when she had a puncture wound from a rotisserie skewer.  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  She states she has trouble climbing stairs.  She states that her knee feels like something is "wrong on the inside" and her knee needs to "pop".  She can't leave her knee in any position for very long, flexed or straight.    Falls: none    Imaging: x-ray: There is narrowing of the medial compartment of the knee joint articular spaces are otherwise preserved with smooth articular surfaces; degenerative changes.    Prior Therapy: none  Social History: Yesi lives with their family  Occupation: phlebotomist at [employer]  Prior Level of Function: independent  Current Level of Function: independent    Pain:  Current 8/10, worst 9/10, best 6/10   Location: right knee  right knee(s)   Description: constant ache  Aggravating Factors: Walking, Extension, and Flexing  Easing Factors: rest    Patients goals: relieve pain     Medical History:   Past Medical History: "   Diagnosis Date    Depression        Surgical History:   Yesi Porter  has a past surgical history that includes  section and Abscess drainage.    Medications:   Yesi has a current medication list which includes the following prescription(s): cholecalciferol (vitamin d3) and hydroxyzine pamoate.    Allergies:   Review of patient's allergies indicates:  No Known Allergies     Objective      LE ROM: WNL bilaterally, including knee = 0-120 degrees; hamstring flexibility 85-90 degrees bilaterally      L/E MMT Right Left   Hip Flexion 4-/5 5/5   Hip Extension 3-/5 4-/5   Hip Abduction 4-/5 5/5   Hip Adduction 4-/5 5/5   Knee Flexion 4-/5 5/5   Knee Extension 4-/5 5/5   Ankle DF 5/5 5/5   Discomfort with MMT to knee    Special knee tests all (-); apprehension test (-)  Balance: 5xSTS 13.42 seconds       30 second STS = 11 reps       Single leg stance: R 14 seconds EO, L 30 seconds EO       MCTSIB= 2/4 (30, 30, 15, 2)    Gait: symmetrical pattern with no antalgia noted; gait speed: regular pace 0.97 meters/second; fast 1.34 meters/second  Stairs: pain on ascending/descending foot over foot    Posture: patient noted to have leg length difference from SI rotation, anterior innominate.  This corrected with muscle energy exercise.  Mild atrophy in lower quad noted.    Intake Outcome Measure for FOTO knee Survey    Therapist reviewed FOTO scores for Yesi Porter on 2023.   FOTO report - see Media section or FOTO account episode details.    Intake Score: 25%         Treatment     Total Treatment time (time-based codes) separate from Evaluation: 10 minutes     Yesi received the treatments listed below:      Therapeutic Exercise Grid     Exercise 1  Exercise 2  Exercise 3  Exercise 4    Exercise :    Muscle energy (resist R hip flexion) Bridging with ball / TB   4 way hip   clamshell     Repetition/Time :    3   10 each   10 each   10      Resist or Assist :       Green TB           Comment :                Done :     yes  yes   yes   yes                    Exercise 5  Exercise 6  Exercise 7  Exercise 8    Exercise :    Hamstring curls, LAQ              Repetition/Time :    10 each              Resist or Assist :    Green TB              Comment :                  Done :    yes                              Exercise 9  Exercise 10  Exercise 11  Exercise 12    Exercise :                  Repetition/Time :                  Resist or Assist :                  Comment :                  Done :                                 Exercise 13 Exercise 14  Exercise 15  Exercise 16   Exercise :                  Repetition/Time :                  Resist or Assist :                  Comment :                  Done :                                  Exercise 17 Exercise 18 Exercise 19 Exercise 20    Exercise :                  Repetition/Time :                  Resist or Assist :                  Comment :                  Done :                               Patient Education and Home Exercises     Education provided:   - Patient was educated about anatomical stressors from SI rotation and R LE weakness.  Importance of home exercise program was emphasized to reduce pain complaints.    Written Home Exercises Provided: yes. Exercises were reviewed and Yesi was able to demonstrate them prior to the end of the session.  Yesi demonstrated good  understanding of the education provided. See EMR under Patient Instructions for exercises provided during therapy sessions.    Assessment     Yesi is a 28 y.o. female referred to outpatient Physical Therapy with a medical diagnosis of chronic R knee pain. Patient presents with R LE weakness, R quad atrophy, R SI dysfunction, and impaired balance.  She will benefit from a strengthening program to reduce pain complaints and improve functional mobility and balance.    Patient prognosis is Excellent.   Patient will benefit from skilled outpatient Physical Therapy to address the deficits stated above and in the chart  below, provide patient /family education, and to maximize patientt's level of independence.     Plan of care discussed with patient: Yes  Patient's spiritual, cultural and educational needs considered and patient is agreeable to the plan of care and goals as stated below:     Anticipated Barriers for therapy: R knee pain, R leg weakness, impaired balance, chronic R SI dysfunction    Medical Necessity is demonstrated by the following  History  Co-morbidities and personal factors that may impact the plan of care [] LOW: no personal factors / co-morbidities  [x] MODERATE: 1-2 personal factors / co-morbidities  [] HIGH: 3+ personal factors / co-morbidities    Moderate / High Support Documentation:   Co-morbidities affecting plan of care: see above    Personal Factors:   no deficits     Examination  Body Structures and Functions, activity limitations and participation restrictions that may impact the plan of care [] LOW: addressing 1-2 elements  [x] MODERATE: 3+ elements  [] HIGH: 4+ elements (please support below)    Moderate / High Support Documentation: see above     Clinical Presentation [] LOW: stable  [x] MODERATE: Evolving  [] HIGH: Unstable     Decision Making/ Complexity Score: moderate         Goals:  Short Term Goals: in 4 weeks:   1. Patient's complaints of pain will be </= 4/10 with activity.  2. FOTO score will be >/= 35% for improved perception of functional mobility.  3. R LE muscle strength will improve by 1/2 muscle grade or more for improved stability.  4. 5xSTS will be </= 11seconds.  5. Patient will be knowledgeable and compliant in home exercise program to continue at home for progressive strengthening.    Long Term Goals: in 6 weeks:  1. Patient's complaints of pain will be </= 2/10 with activities.  2. Patient will be independent in Home Exercise Program.  3. L LE strength will improve to >/= 4+/5 for improved stability during functional mobility.  4. FOTO score will be >/= 59% for improved  perception of functional mobility.  5. Single leg stance on R LE will be >/= 25 seconds.  6. MCTSIB will improve to >/= 3/4 for safer community ambulation.    Plan     Plan of care Certification: 11/30/2023 to 1/12/2024.    Outpatient Physical Therapy 2 times weekly for 6 weeks to include the following interventions: Neuromuscular Re-ed, Patient Education, and Therapeutic Exercise.     Nikole Franklin PT        Physician's Signature: _________________________________________ Date: ________________

## 2023-12-12 DIAGNOSIS — M25.561 CHRONIC PAIN OF RIGHT KNEE: Primary | ICD-10-CM

## 2023-12-12 DIAGNOSIS — G89.29 CHRONIC PAIN OF RIGHT KNEE: Primary | ICD-10-CM

## 2023-12-13 DIAGNOSIS — F41.9 ANXIETY: Primary | ICD-10-CM

## 2023-12-13 RX ORDER — HYDROXYZINE PAMOATE 25 MG/1
25 CAPSULE ORAL NIGHTLY
Qty: 30 CAPSULE | Refills: 6 | Status: SHIPPED | OUTPATIENT
Start: 2023-12-13 | End: 2024-02-07 | Stop reason: SDUPTHER

## 2023-12-19 ENCOUNTER — CLINICAL SUPPORT (OUTPATIENT)
Dept: REHABILITATION | Facility: HOSPITAL | Age: 28
End: 2023-12-19
Attending: NURSE PRACTITIONER
Payer: COMMERCIAL

## 2023-12-19 DIAGNOSIS — M62.81 MUSCLE WEAKNESS OF LOWER EXTREMITY: Primary | ICD-10-CM

## 2023-12-19 DIAGNOSIS — Z74.09 IMPAIRED FUNCTIONAL MOBILITY, BALANCE, AND ENDURANCE: ICD-10-CM

## 2023-12-19 DIAGNOSIS — R29.3 POSTURAL IMBALANCE: ICD-10-CM

## 2023-12-19 PROCEDURE — 97110 THERAPEUTIC EXERCISES: CPT

## 2023-12-19 NOTE — PROGRESS NOTES
[facility]   Physical Therapy Treatment Note      Name: Yesi Porter  Clinic Number: [id number]    Therapy Diagnosis:   Encounter Diagnoses   Name Primary?    Muscle weakness of lower extremity Yes    Postural imbalance     Impaired functional mobility, balance, and endurance      Physician: Mitzi Dow F*    Visit Date: 12/19/2023     Physician Orders: PT Eval and Treat   Medical Diagnosis from Referral: M25.561 chronic pain of R knee  Evaluation Date: 11/30/2023  Authorization Period Expiration: 1/19/2024  Plan of Care Expiration: 1/19/2024  Progress Note Due: 12/29/2023     Visit # / Visits authorized: 1/ 12   FOTO: 2/ 3 (25, 34)     Precautions: Standard      Time In: 1430  Time Out: 1500  Total Billable Time: 30 minutes    PTA Visit #: 0/5       Subjective     Patient reports: her knee feels better in general. Pain is much less in the morning but by the end of her work day is painful again.  She was compliant with home exercise program.  Response to previous treatment: pain reducing  Functional change: able to begin her day with minimal pain    Pain: 6/10  Location: right knee      Objective      FOTO = 34% today    Treatment     Yesi received the treatments listed below:      therapeutic exercises to develop strength and endurance for 30 minutes including:     Therapeutic Exercise Grid     Exercise 1  Exercise 2  Exercise 3  Exercise 4    Exercise :    Muscle energy (resist R hip flexion) Bridging with ball / TB   4 way hip   R clamshell     Repetition/Time :    3   2 x 10 each   10 each   2 x 10      Resist or Assist :       Green TB           Comment :    Level today             Done :    no  yes   yes   yes                      Exercise 5  Exercise 6  Exercise 7  Exercise 8    Exercise :    Hamstring curls, LAQ, hip flexion   Step ups: forward/lateral   STS with ball spacer        Repetition/Time :    10 each   10 each   10        Resist or Assist :    Green TB               Comment :          No UE assist        Done :    yes   yes   yes                         Exercise 9  Exercise 10  Exercise 11  Exercise 12    Exercise :                  Repetition/Time :                  Resist or Assist :                  Comment :                  Done :                                   Exercise 13 Exercise 14  Exercise 15  Exercise 16   Exercise :                  Repetition/Time :                  Resist or Assist :                  Comment :                  Done :                                   Exercise 17 Exercise 18 Exercise 19 Exercise 20    Exercise :                  Repetition/Time :                  Resist or Assist :                  Comment :                  Done :                                   Patient Education and Home Exercises       Education provided:   - Patient was educated about anatomical stressors from SI rotation and R LE weakness. Importance of home exercise program was emphasized to reduce pain complaints.Patient was educated about anatomical stressors from SI rotation and R LE weakness. Importance of home exercise program was emphasized to reduce pain complaints.Patient was educated about anatomical stressors from SI rotation and R LE weakness. Importance of home exercise program was emphasized to reduce pain complaints.    Written Home Exercises Provided: yes. Exercises were reviewed and Yesi was able to demonstrate them prior to the end of the session.  Yesi demonstrated good  understanding of the education provided. See Electronic Medical Record under Patient Instructions for exercises provided during therapy sessions    Assessment     Patient's pain has improved but since she is on her feet all day with work, she is still painful by the end of her day.  Her FOTO score has improved 9 points. She was fatigued after the session.  Educated her use ice instead of heat on her knee after exercising.    Yesi Is progressing well towards her goals.   Patient prognosis  is Excellent.     Patient will continue to benefit from skilled outpatient physical therapy to address the deficits listed in the problem list box on initial evaluation, provide pt/family education and to maximize pt's level of independence in the home and community environment.     Patient's spiritual, cultural and educational needs considered and pt agreeable to plan of care and goals.     Anticipated barriers to physical therapy:  R knee pain, R leg weakness, impaired balance, chronic R SI dysfunction    Goals:   Short Term Goals: in 4 weeks:   1. Patient's complaints of pain will be </= 4/10 with activity.  2. FOTO score will be >/= 35% for improved perception of functional mobility.  3. R LE muscle strength will improve by 1/2 muscle grade or more for improved stability.  4. 5xSTS will be </= 11seconds.  5. Patient will be knowledgeable and compliant in home exercise program to continue at home for progressive strengthening.     Long Term Goals: in 6 weeks:  1. Patient's complaints of pain will be </= 2/10 with activities.  2. Patient will be independent in Home Exercise Program.  3. L LE strength will improve to >/= 4+/5 for improved stability during functional mobility.  4. FOTO score will be >/= 59% for improved perception of functional mobility.  5. Single leg stance on R LE will be >/= 25 seconds.  6. MCTSIB will improve to >/= 3/4 for safer community ambulation.    Plan        Outpatient Physical Therapy 2 times weekly for 6 weeks to include the following interventions: Neuromuscular Re-ed, Patient Education, and Therapeutic Exercise.        Nikole Franklin, PT

## 2023-12-21 ENCOUNTER — CLINICAL SUPPORT (OUTPATIENT)
Dept: REHABILITATION | Facility: HOSPITAL | Age: 28
End: 2023-12-21
Attending: NURSE PRACTITIONER
Payer: COMMERCIAL

## 2023-12-21 DIAGNOSIS — M62.81 MUSCLE WEAKNESS OF LOWER EXTREMITY: Primary | ICD-10-CM

## 2023-12-21 DIAGNOSIS — R29.3 POSTURAL IMBALANCE: ICD-10-CM

## 2023-12-21 DIAGNOSIS — Z74.09 IMPAIRED FUNCTIONAL MOBILITY, BALANCE, AND ENDURANCE: ICD-10-CM

## 2023-12-21 PROCEDURE — 97110 THERAPEUTIC EXERCISES: CPT | Mod: CQ

## 2023-12-21 NOTE — PROGRESS NOTES
[facility]   Physical Therapy Treatment Note      Name: Yesi Porter  Clinic Number: [id number]    Therapy Diagnosis:   Encounter Diagnoses   Name Primary?    Muscle weakness of lower extremity Yes    Postural imbalance     Impaired functional mobility, balance, and endurance      Physician: Mitzi Dow F*    Visit Date: 12/21/2023     Physician Orders: PT Eval and Treat   Medical Diagnosis from Referral: M25.561 chronic pain of R knee  Evaluation Date: 11/30/2023  Authorization Period Expiration: 1/19/2024  Plan of Care Expiration: 1/19/2024  Progress Note Due: 12/29/2023     Visit # / Visits authorized: 2/ 12   FOTO: 2/ 3 (25, 34)     Precautions: Standard      Time In: 1344  Time Out: 1445  Total Billable Time: 31 minutes    PTA Visit #: 1/5       Subjective     Patient reports:  She has been working today and her R knee is hurting more.  She was compliant with home exercise program.  Response to previous treatment: pain reducing  Functional change: able to begin her day with minimal pain    Pain: 7/10  Location: right knee      Objective      N/A    Treatment     Yesi received the treatments listed below:      therapeutic exercises to develop strength and endurance for 31 minutes including:     Therapeutic Exercise Grid     Exercise 1  Exercise 2  Exercise 3  Exercise 4    Exercise :    Muscle energy (resist R hip flexion) Bridging with ball / TB   4 way hip   R clamshell     Repetition/Time :    3   2 x 10 each   10 each   2 x 10      Resist or Assist :       Green TB           Comment :    Level today             Done :    no  yes   yes   yes                      Exercise 5  Exercise 6  Exercise 7  Exercise 8    Exercise :    Hamstring curls, LAQ, hip flexion   Step ups: forward/lateral   STS with ball spacer        Repetition/Time :    10 each   10 each   10        Resist or Assist :    Green TB              Comment :          No UE assist        Done :    yes   yes   yes                          Exercise 9  Exercise 10  Exercise 11  Exercise 12    Exercise :                  Repetition/Time :                  Resist or Assist :                  Comment :                  Done :                                   Exercise 13 Exercise 14  Exercise 15  Exercise 16   Exercise :                  Repetition/Time :                  Resist or Assist :                  Comment :                  Done :                                   Exercise 17 Exercise 18 Exercise 19 Exercise 20    Exercise :                  Repetition/Time :                  Resist or Assist :                  Comment :                  Done :                                   Patient Education and Home Exercises       Education provided:   - Patient was educated about anatomical stressors from SI rotation and R LE weakness. Importance of home exercise program was emphasized to reduce pain complaints.Patient was educated about anatomical stressors from SI rotation and R LE weakness. Importance of home exercise program was emphasized to reduce pain complaints.Patient was educated about anatomical stressors from SI rotation and R LE weakness. Importance of home exercise program was emphasized to reduce pain complaints.    Written Home Exercises Provided: yes. Exercises were reviewed and Yesi was able to demonstrate them prior to the end of the session.  Yesi demonstrated good  understanding of the education provided. See Electronic Medical Record under Patient Instructions for exercises provided during therapy sessions    Assessment     Patient completed all exercises with good effort, however she did report increased pain after session. 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 Performed mobilizations to R knee with little relief noted. Moderate verbal and tactile cues required for form.     Yesi  Is progressing well towards her goals.   Patient prognosis is Excellent.     Patient will continue to benefit from skilled outpatient physical therapy to address the deficits listed in the problem list box on initial evaluation, provide pt/family education and to maximize pt's level of independence in the home and community environment.     Patient's spiritual, cultural and educational needs considered and pt agreeable to plan of care and goals.     Anticipated barriers to physical therapy:  R knee pain, R leg weakness, impaired balance, chronic R SI dysfunction    Goals:   Short Term Goals: in 4 weeks:   1. Patient's complaints of pain will be </= 4/10 with activity.  2. FOTO score will be >/= 35% for improved perception of functional mobility.  3. R LE muscle strength will improve by 1/2 muscle grade or more for improved stability.  4. 5xSTS will be </= 11seconds.  5. Patient will be knowledgeable and compliant in home exercise program to continue at home for progressive strengthening.     Long Term Goals: in 6 weeks:  1. Patient's complaints of pain will be </= 2/10 with activities.  2. Patient will be independent in Home Exercise Program.  3. L LE strength will improve to >/= 4+/5 for improved stability during functional mobility.  4. FOTO score will be >/= 59% for improved perception of functional mobility.  5. Single leg stance on R LE will be >/= 25 seconds.  6. MCTSIB will improve to >/= 3/4 for safer community ambulation.    Plan        Outpatient Physical Therapy 2 times weekly for 6 weeks to include the following interventions: Neuromuscular Re-ed, Patient Education, and Therapeutic Exercise.        Ric Scales, PTA

## 2023-12-28 ENCOUNTER — CLINICAL SUPPORT (OUTPATIENT)
Dept: REHABILITATION | Facility: HOSPITAL | Age: 28
End: 2023-12-28
Attending: NURSE PRACTITIONER
Payer: COMMERCIAL

## 2023-12-28 DIAGNOSIS — Z74.09 IMPAIRED FUNCTIONAL MOBILITY, BALANCE, AND ENDURANCE: ICD-10-CM

## 2023-12-28 DIAGNOSIS — R29.3 POSTURAL IMBALANCE: ICD-10-CM

## 2023-12-28 DIAGNOSIS — M62.81 MUSCLE WEAKNESS OF LOWER EXTREMITY: Primary | ICD-10-CM

## 2023-12-28 PROCEDURE — 97110 THERAPEUTIC EXERCISES: CPT | Mod: CQ

## 2023-12-28 NOTE — PROGRESS NOTES
[facility]   Physical Therapy Treatment Note      Name: Yesi Porter  Clinic Number: [id number]    Therapy Diagnosis:   Encounter Diagnoses   Name Primary?    Muscle weakness of lower extremity Yes    Postural imbalance     Impaired functional mobility, balance, and endurance      Physician: Mitzi Dow F*    Visit Date: 12/28/2023     Physician Orders: PT Eval and Treat   Medical Diagnosis from Referral: M25.561 chronic pain of R knee  Evaluation Date: 11/30/2023  Authorization Period Expiration: 1/19/2024  Plan of Care Expiration: 1/19/2024  Progress Note Due: 12/29/2023     Visit # / Visits authorized: 3/ 12   FOTO: 2/ 3 (25, 34)     Precautions: Standard      Time In: 1500  Time Out: 1530  Total Billable Time: 30 minutes    PTA Visit #: 2/5       Subjective     Patient reports:  her R knee is very sore today due to her being very busy at work.   She was compliant with home exercise program.  Response to previous treatment: pain reducing  Functional change: able to begin her day with minimal pain    Pain: 7-8/10  Location: right knee      Objective      N/A    Treatment     Yesi received the treatments listed below:      therapeutic exercises to develop strength and endurance for 30 minutes including:     Therapeutic Exercise Grid     Exercise 1  Exercise 2  Exercise 3  Exercise 4    Exercise :    Muscle energy (resist R hip flexion) Bridging with ball / TB   4 way hip   R clamshell     Repetition/Time :    3   2 x 10 each   10 each   2 x 10      Resist or Assist :       Green TB           Comment :    Level today             Done :    no  yes   yes   yes                      Exercise 5  Exercise 6  Exercise 7  Exercise 8    Exercise :    Hamstring curls, LAQ, hip flexion   Step ups: forward/lateral   STS with ball spacer   R knee mobilizations    Repetition/Time :    10 each   10 each   10        Resist or Assist :    Green TB              Comment :          No UE assist         Done :    yes   yes   yes   yes                      Exercise 9  Exercise 10  Exercise 11  Exercise 12    Exercise :    Instrument assisted soft tissue mobilizations               Repetition/Time :                  Resist or Assist :                  Comment :    To R distal quat above the patella.              Done :    yes                               Exercise 13 Exercise 14  Exercise 15  Exercise 16   Exercise :                  Repetition/Time :                  Resist or Assist :                  Comment :                  Done :                                   Exercise 17 Exercise 18 Exercise 19 Exercise 20    Exercise :                  Repetition/Time :                  Resist or Assist :                  Comment :                  Done :                                   Patient Education and Home Exercises       Education provided:   - Patient was educated about anatomical stressors from SI rotation and R LE weakness. Importance of home exercise program was emphasized to reduce pain complaints.Patient was educated about anatomical stressors from SI rotation and R LE weakness. Importance of home exercise program was emphasized to reduce pain complaints.Patient was educated about anatomical stressors from SI rotation and R LE weakness. Importance of home exercise program was emphasized to reduce pain complaints.    Written Home Exercises Provided: yes. Exercises were reviewed and Yesi was able to demonstrate them prior to the end of the session.  Yesi demonstrated good  understanding of the education provided. See Electronic Medical Record under Patient Instructions for exercises provided during therapy sessions    Assessment     Patient completed all exercises with good effort. Yesi reported no increased pain in the R knee during exercises, but did have muscle fatigue. Instrument assisted soft tissue mobilizations performed to distal R quad above the patella. Knee mobilizations, and mobilizations with  movement performed to the R knee with good tolerance and good relief of pain after session. Moderate verbal and tactile cues required for form.     Yesi Is progressing well towards her goals.   Patient prognosis is Excellent.     Patient will continue to benefit from skilled outpatient physical therapy to address the deficits listed in the problem list box on initial evaluation, provide pt/family education and to maximize pt's level of independence in the home and community environment.     Patient's spiritual, cultural and educational needs considered and pt agreeable to plan of care and goals.     Anticipated barriers to physical therapy:  R knee pain, R leg weakness, impaired balance, chronic R SI dysfunction    Goals:   Short Term Goals: in 4 weeks:   1. Patient's complaints of pain will be </= 4/10 with activity.  2. FOTO score will be >/= 35% for improved perception of functional mobility.  3. R LE muscle strength will improve by 1/2 muscle grade or more for improved stability.  4. 5xSTS will be </= 11seconds.  5. Patient will be knowledgeable and compliant in home exercise program to continue at home for progressive strengthening.     Long Term Goals: in 6 weeks:  1. Patient's complaints of pain will be </= 2/10 with activities.  2. Patient will be independent in Home Exercise Program.  3. L LE strength will improve to >/= 4+/5 for improved stability during functional mobility.  4. FOTO score will be >/= 59% for improved perception of functional mobility.  5. Single leg stance on R LE will be >/= 25 seconds.  6. MCTSIB will improve to >/= 3/4 for safer community ambulation.    Plan        Outpatient Physical Therapy 2 times weekly for 6 weeks to include the following interventions: Neuromuscular Re-ed, Patient Education, and Therapeutic Exercise.        Ric Scales, PTA

## 2023-12-29 ENCOUNTER — CLINICAL SUPPORT (OUTPATIENT)
Dept: REHABILITATION | Facility: HOSPITAL | Age: 28
End: 2023-12-29
Attending: NURSE PRACTITIONER
Payer: COMMERCIAL

## 2023-12-29 DIAGNOSIS — R29.3 POSTURAL IMBALANCE: ICD-10-CM

## 2023-12-29 DIAGNOSIS — M62.81 MUSCLE WEAKNESS OF LOWER EXTREMITY: Primary | ICD-10-CM

## 2023-12-29 DIAGNOSIS — Z74.09 IMPAIRED FUNCTIONAL MOBILITY, BALANCE, AND ENDURANCE: ICD-10-CM

## 2023-12-29 PROCEDURE — 97110 THERAPEUTIC EXERCISES: CPT

## 2023-12-29 NOTE — PROGRESS NOTES
[facility]   Reassessment / Physical Therapy Treatment Note      Name: Yesi Porter  Clinic Number: [id number]    Therapy Diagnosis:   Encounter Diagnoses   Name Primary?    Muscle weakness of lower extremity Yes    Postural imbalance     Impaired functional mobility, balance, and endurance        Physician: Mitzi Dow F*    Visit Date: 12/29/2023     Physician Orders: PT Eval and Treat   Medical Diagnosis from Referral: M25.561 chronic pain of R knee  Evaluation Date: 11/30/2023  Authorization Period Expiration: 1/19/2024  Plan of Care Expiration: 1/19/2024  Reassessment / Plan of Care completed: RA- 12/29/2023   Visit # / Visits authorized: 4/ 12   FOTO: 2/ 3 (25, 34)     Precautions: Standard      Time In: 1520  Time Out: 1550  Total Billable Time: 30 minutes    PTA Visit #: 2/5     Subjective     Patient reports: less R knee pain after massage during therapy yesterday even being on her feet all day at work   She was compliant with home exercise program.  Response to previous treatment: good  Functional change: less R knee pain    Pain: 3/10  Location: right knee      Objective      R knee ROM: WNL 0-120 degrees  Muscle tightness: hamstring, quadriceps     R LE strength: 4/5 MMT throughout     Balance:                  30 second STS = 17 reps (increased from 11)                  Single leg stance: R: 25 seconds, L: 30 seconds                   MCTSIB= 4/4 (30, 30, 30, 30) (increased from 2/4)     Gait: Independent without abnormalities present  Stairs: Independent without rails, slight R knee pain    Treatment     Yesi received the treatments listed below:      therapeutic exercises to develop strength and endurance for 30 minutes including:     Therapeutic Exercise Grid     Exercise 1  Exercise 2  Exercise 3  Exercise 4    Exercise :    Muscle energy (resist R hip flexion) Bridging with ball / TB   4 way hip   R clamshell     Repetition/Time :    3   2 x 10 each   15 each   2  x 10      Resist or Assist :       [place] BIRCH           Comment :    Level today             Done :    no  yes   yes   no                      Exercise 5  Exercise 6  Exercise 7  Exercise 8    Exercise :    Hamstring curls, LAQ  Step ups: forward/lateral   STS   R knee mobilizations    Repetition/Time :    15, 15 x 5 secs  10 each   17        Resist or Assist :    [place] BIRCH, 3 lbs              Comment :          No UE assist        Done :    yes   yes   yes   no                     Exercise 9  Exercise 10  Exercise 11  Exercise 12    Exercise :    Instrument assisted soft tissue mobilizations    Balance: foam: EO  EC  R single leg stance   BOSU: static  squats     Repetition/Time :        30 secs sec  25 secs max   10     Resist or Assist :                  Comment :    To R distal quat above the patella.              Done :    no    yes  yes   yes                      Exercise 13 Exercise 14  Exercise 15  Exercise 16   Exercise :                  Repetition/Time :                  Resist or Assist :                  Comment :                  Done :                                   Exercise 17 Exercise 18 Exercise 19 Exercise 20    Exercise :                  Repetition/Time :                  Resist or Assist :                  Comment :                  Done :                                   Patient Education and Home Exercises       Education provided:   - Patient was educated about anatomical stressors from SI rotation and R LE weakness. Importance of home exercise program was emphasized to reduce pain complaints.Patient was educated about anatomical stressors from SI rotation and R LE weakness. Importance of home exercise program was emphasized to reduce pain complaints.Patient was educated about anatomical stressors from SI rotation and R LE weakness. Importance of home exercise program was emphasized to reduce pain complaints.    Written Home Exercises Provided: yes. Exercises were reviewed and Yesi was able  to demonstrate them prior to the end of the session.  Yesi demonstrated good  understanding of the education provided. See Electronic Medical Record under Patient Instructions for exercises provided during therapy sessions    Assessment     Pt progressing well with reduction of R knee pain after mobilization and soft tissue massage last session, R LE strength, R LE proprioception input, and overall functional mobility. Discussed importance of performing HEP daily outside of therapy. Pt to continue with current POC, progress per pt's tolerance, and reassess pt at later date to determine need for additional therapy      Yesi Is progressing well towards her goals.   Patient prognosis is Excellent.     Patient will continue to benefit from skilled outpatient physical therapy to address the deficits listed in the problem list box on initial evaluation, provide pt/family education and to maximize pt's level of independence in the home and community environment.     Patient's spiritual, cultural and educational needs considered and pt agreeable to plan of care and goals.     Anticipated barriers to physical therapy:  R knee pain, R leg weakness, impaired balance, chronic R SI dysfunction    Goals:   Short Term Goals: in 4 weeks:   1. Patient's complaints of pain will be </= 4/10 with activity.- progressing (c/o 3/10 after last PT session which reduced her pain)  2. FOTO score will be >/= 35% for improved perception of functional mobility.- progressing (34)  3. R LE muscle strength will improve by 1/2 muscle grade or more for improved stability.- met (4/5 MMT throughout)  4. 5xSTS will be </= 11seconds.- progressing  5. Patient will be knowledgeable and compliant in home exercise program to continue at home for progressive strengthening.- progressing (pt performs a few exercises outside of therapy)     Long Term Goals: in 6 weeks:  1. Patient's complaints of pain will be </= 2/10 with activities.- progressing (c/o 3/10 after  last PT session which reduced her pain)  2. Patient will be independent in Home Exercise Program.- progressing (pt performs a few exercises outside of therapy)   3. L LE strength will improve to >/= 4+/5 for improved stability during functional mobility.- progressing  (4/5 MMT throughout)  4. FOTO score will be >/= 59% for improved perception of functional mobility.- progressing (34)  5. Single leg stance on R LE will be >/= 25 seconds.- met (25 secs)  6. MCTSIB will improve to >/= 3/4 for safer community ambulation.- met (4/4 30,30,30,30)    Plan      Outpatient Physical Therapy 2 times weekly for 6 weeks to include the following interventions: Neuromuscular Re-ed, Patient Education, and Therapeutic Exercise.      Nandini Roe, PT

## 2024-01-04 ENCOUNTER — CLINICAL SUPPORT (OUTPATIENT)
Dept: REHABILITATION | Facility: HOSPITAL | Age: 29
End: 2024-01-04
Attending: NURSE PRACTITIONER
Payer: COMMERCIAL

## 2024-01-04 DIAGNOSIS — M62.81 MUSCLE WEAKNESS OF LOWER EXTREMITY: Primary | ICD-10-CM

## 2024-01-04 DIAGNOSIS — R29.3 POSTURAL IMBALANCE: ICD-10-CM

## 2024-01-04 DIAGNOSIS — Z74.09 IMPAIRED FUNCTIONAL MOBILITY, BALANCE, AND ENDURANCE: ICD-10-CM

## 2024-01-04 PROCEDURE — 97110 THERAPEUTIC EXERCISES: CPT

## 2024-01-04 NOTE — PROGRESS NOTES
[facility]   Physical Therapy Treatment Note      Name: Yesi Porter  Clinic Number: [id number]    Therapy Diagnosis:   Encounter Diagnoses   Name Primary?    Muscle weakness of lower extremity Yes    Postural imbalance     Impaired functional mobility, balance, and endurance        Physician: Mitzi Dow F*    Visit Date: 1/4/2024     Physician Orders: PT Eval and Treat   Medical Diagnosis from Referral: M25.561 chronic pain of R knee  Evaluation Date: 11/30/2023  Authorization Period Expiration: 1/19/2024  Plan of Care Expiration: 1/19/2024  Reassessment / Plan of Care completed: RA- 12/29/2023   Visit # / Visits authorized: 5/ 12   FOTO: 2/ 3 (25, 34, 40)     Precautions: Standard      Time In: 1505  Time Out: 1546  Total Billable Time: 41 minutes    PTA Visit #: 2/5     Subjective     Patient reports: that she is pain free today. Has been working a lot this week and the knee has been good.   She was compliant with home exercise program.  Response to previous treatment: good  Functional change: diminished R knee pain     Pain: 0/10  Location: right knee      Objective      FOTO: 40    Treatment     Yesi received the treatments listed below:      therapeutic exercises to develop strength and endurance for 30 minutes including:     Therapeutic Exercise Grid     Exercise 1  Exercise 2  Exercise 3  Exercise 4    Exercise :    Muscle energy (resist R hip flexion) Bridging with ball / TB   4 way hip   R clamshell     Repetition/Time :    3   2 x 10 each   15 each   2 x 10      Resist or Assist :       Green TB           Comment :    Level today             Done :                           Exercise 5  Exercise 6  Exercise 7  Exercise 8    Exercise :    Hamstring curls, LAQ  Step ups: forward/lateral   STS   R knee mobilizations    Repetition/Time :    20 10 each   17   X 10     Resist or Assist :     2 lbs              Comment :    HS curls only      No UE assist   All planes    Done  :    yes   yes      YES                      Exercise 9  Exercise 10  Exercise 11  Exercise 12    Exercise :    Instrument assisted soft tissue mobilizations    Balance: foam: EO  EC  R single leg stance   BOSU: static  squats     Repetition/Time :        30 secs sec  25 secs max   10     Resist or Assist :                  Comment :    To R distal quat above the patella.              Done :    no                      Exercise 13 Exercise 14  Exercise 15  Exercise 16   Exercise :    QS   SAQ   S/L R hip ABD   Stretch R hamstring     Repetition/Time :    3 x 10   X 20   X 20 3 x 20     Resist or Assist :    3-5-7 sec hold   2#   2#        Comment :                  Done :    YES    YES    YES    YES                       Exercise 17 Exercise 18 Exercise 19 Exercise 20    Exercise :    Stretch R rectus/Quad   Step downs  -fwd  -retro           Repetition/Time :    3 x 20 sec   10           Resist or Assist :                  Comment :                  Done :    YES    YES                             Patient Education and Home Exercises       Education provided:   -reviewed HEP    Written Home Exercises Provided: yes. Exercises were reviewed and Yesi was able to demonstrate them prior to the end of the session.  Yesi demonstrated good  understanding of the education provided. See Electronic Medical Record under Patient Instructions for exercises provided during therapy sessions    Assessment     Yesi tolerated treatment well today completing all assigned exercises with good effort and reporting no additional discomfort during treatment session. Patient required moderate verbal cues and minimal tactile cues for proper exercise execution. Focused exercises on quad and hip complex. Patient demonstrated good leg control. Minimal difficulty noted with eccentric step downs. Improved quad flexibility .        Yesi Is progressing well towards her goals.   Patient prognosis is Excellent.     Patient will continue to benefit from  skilled outpatient physical therapy to address the deficits listed in the problem list box on initial evaluation, provide pt/family education and to maximize pt's level of independence in the home and community environment.     Patient's spiritual, cultural and educational needs considered and pt agreeable to plan of care and goals.     Anticipated barriers to physical therapy:  R knee pain, R leg weakness, impaired balance, chronic R SI dysfunction    Goals:   Short Term Goals: in 4 weeks:   1. Patient's complaints of pain will be </= 4/10 with activity.- progressing (c/o 3/10 after last PT session which reduced her pain)  2. FOTO score will be >/= 35% for improved perception of functional mobility.- progressing (34)  3. R LE muscle strength will improve by 1/2 muscle grade or more for improved stability.- met (4/5 MMT throughout)  4. 5xSTS will be </= 11seconds.- progressing  5. Patient will be knowledgeable and compliant in home exercise program to continue at home for progressive strengthening.- progressing (pt performs a few exercises outside of therapy)     Long Term Goals: in 6 weeks:  1. Patient's complaints of pain will be </= 2/10 with activities.- progressing (c/o 3/10 after last PT session which reduced her pain)  2. Patient will be independent in Home Exercise Program.- progressing (pt performs a few exercises outside of therapy)   3. L LE strength will improve to >/= 4+/5 for improved stability during functional mobility.- progressing  (4/5 MMT throughout)  4. FOTO score will be >/= 59% for improved perception of functional mobility.- progressing (34)  5. Single leg stance on R LE will be >/= 25 seconds.- met (25 secs)  6. MCTSIB will improve to >/= 3/4 for safer community ambulation.- met (4/4 30,30,30,30)    Plan      Outpatient Physical Therapy 2 times weekly for 6 weeks to include the following interventions: Neuromuscular Re-ed, Patient Education, and Therapeutic Exercise.      Laci Fitzpatrick,  PT

## 2024-01-05 ENCOUNTER — CLINICAL SUPPORT (OUTPATIENT)
Dept: REHABILITATION | Facility: HOSPITAL | Age: 29
End: 2024-01-05
Attending: NURSE PRACTITIONER
Payer: COMMERCIAL

## 2024-01-05 DIAGNOSIS — Z74.09 IMPAIRED FUNCTIONAL MOBILITY, BALANCE, AND ENDURANCE: ICD-10-CM

## 2024-01-05 DIAGNOSIS — M62.81 MUSCLE WEAKNESS OF LOWER EXTREMITY: Primary | ICD-10-CM

## 2024-01-05 DIAGNOSIS — R29.3 POSTURAL IMBALANCE: ICD-10-CM

## 2024-01-05 PROCEDURE — 97110 THERAPEUTIC EXERCISES: CPT

## 2024-01-05 NOTE — PROGRESS NOTES
[facility]   Physical Therapy Treatment Note      Name: Yesi Porter  Clinic Number: [id number]    Therapy Diagnosis:   Encounter Diagnoses   Name Primary?    Muscle weakness of lower extremity Yes    Postural imbalance     Impaired functional mobility, balance, and endurance        Physician: Mitzi Dow F*    Visit Date: 1/5/2024     Physician Orders: PT Eval and Treat   Medical Diagnosis from Referral: M25.561 chronic pain of R knee  Evaluation Date: 11/30/2023  Authorization Period Expiration: 1/19/2024  Plan of Care Expiration: 1/19/2024  Reassessment / Plan of Care completed: RA- 12/29/2023   Visit # / Visits authorized: 6/ 12   FOTO: 2/ 3 (25, 34, 40)     Precautions: Standard      Time In: 1503  Time Out: 1535  Total Billable Time: 32 minutes    PTA Visit #: 2/5     Subjective     Patient reports: that she is pain free today. States that she has noticed popping in both knees.   She was compliant with home exercise program.  Response to previous treatment: good  Functional change: pain free     Pain: 0/10  Location: right knee      Objective      FOTO: 40    Treatment     Yesi received the treatments listed below:      therapeutic exercises to develop strength and endurance for 30 minutes including:     Therapeutic Exercise Grid     Exercise 1  Exercise 2  Exercise 3  Exercise 4    Exercise :    Muscle energy (resist R hip flexion) Bridging with ball / TB   3 way hip   R clamshell     Repetition/Time :    3   2 x 10 each   2 x 10   2 x 10      Resist or Assist :       Green TB   Red TB        Comment :    Level today             Done :      YES                      Exercise 5  Exercise 6  Exercise 7  Exercise 8    Exercise :    Hamstring curls, LAQ  Step ups: forward/lateral   STS   R knee mobilizations    Repetition/Time :    20 10 each   17   X 10     Resist or Assist :     3 lbs              Comment :    LAQ only     No UE assist   All planes    Done :    YES                            Exercise 9  Exercise 10  Exercise 11  Exercise 12    Exercise :    Instrument assisted soft tissue mobilizations    Balance: foam: SLS- bilateral      BOSU: static  squats     Repetition/Time :        6 x 20 sec   10     Resist or Assist :                  Comment :    To R distal quat above the patella.   Alternating           Done :    no   YES                    Exercise 13 Exercise 14  Exercise 15  Exercise 16   Exercise :    QS   SAQ   S/L R hip ABD   Stretch R hamstring     Repetition/Time :    3 x 10   X 20   X 20 3 x 20     Resist or Assist :    3-5-7 sec hold   2#   2#        Comment :                  Done :                           Exercise 17 Exercise 18 Exercise 19 Exercise 20    Exercise :    Stretch R rectus/Quad   Step downs  -fwd  -retro   Squats   Nusttep     Repetition/Time :    3 x 20 sec   10   2  x 10 5 mins     Resist or Assist :             L3     Comment :                  Done :    YES       YES    YES                       Patient Education and Home Exercises       Education provided:   -reviewed HEP    Written Home Exercises Provided: yes. Exercises were reviewed and Yesi was able to demonstrate them prior to the end of the session.  Yesi demonstrated good  understanding of the education provided. See Electronic Medical Record under Patient Instructions for exercises provided during therapy sessions    Assessment     Yesi tolerated treatment well today completing all assigned exercises with good effort and reporting minimal popping in bilateral knees  during treatment session. Patient required moderate verbal cues and minimal tactile cues for proper exercise execution. Patient required finger tip support to complete single leg stand bilaterally. Noted improvement with increased reps. Good lower extremity stability during squats . Added nustep for knee RANGE OF MOTION and endurance.          Yesi Is progressing well towards her goals.   Patient prognosis is Excellent.      Patient will continue to benefit from skilled outpatient physical therapy to address the deficits listed in the problem list box on initial evaluation, provide pt/family education and to maximize pt's level of independence in the home and community environment.     Patient's spiritual, cultural and educational needs considered and pt agreeable to plan of care and goals.     Anticipated barriers to physical therapy:  R knee pain, R leg weakness, impaired balance, chronic R SI dysfunction    Goals:   Short Term Goals: in 4 weeks:   1. Patient's complaints of pain will be </= 4/10 with activity.- progressing (c/o 3/10 after last PT session which reduced her pain)  2. FOTO score will be >/= 35% for improved perception of functional mobility.- progressing (34)  3. R LE muscle strength will improve by 1/2 muscle grade or more for improved stability.- met (4/5 MMT throughout)  4. 5xSTS will be </= 11seconds.- progressing  5. Patient will be knowledgeable and compliant in home exercise program to continue at home for progressive strengthening.- progressing (pt performs a few exercises outside of therapy)     Long Term Goals: in 6 weeks:  1. Patient's complaints of pain will be </= 2/10 with activities.- progressing (c/o 3/10 after last PT session which reduced her pain)  2. Patient will be independent in Home Exercise Program.- progressing (pt performs a few exercises outside of therapy)   3. L LE strength will improve to >/= 4+/5 for improved stability during functional mobility.- progressing  (4/5 MMT throughout)  4. FOTO score will be >/= 59% for improved perception of functional mobility.- progressing (34)  5. Single leg stance on R LE will be >/= 25 seconds.- met (25 secs)  6. MCTSIB will improve to >/= 3/4 for safer community ambulation.- met (4/4 30,30,30,30)    Plan      Outpatient Physical Therapy 2 times weekly for 6 weeks to include the following interventions: Neuromuscular Re-ed, Patient Education, and  Therapeutic Exercise.      Laci Fitzpatrick, PT

## 2024-01-09 ENCOUNTER — CLINICAL SUPPORT (OUTPATIENT)
Dept: REHABILITATION | Facility: HOSPITAL | Age: 29
End: 2024-01-09
Attending: NURSE PRACTITIONER
Payer: COMMERCIAL

## 2024-01-09 DIAGNOSIS — Z74.09 IMPAIRED FUNCTIONAL MOBILITY, BALANCE, AND ENDURANCE: ICD-10-CM

## 2024-01-09 DIAGNOSIS — M62.81 MUSCLE WEAKNESS OF LOWER EXTREMITY: Primary | ICD-10-CM

## 2024-01-09 DIAGNOSIS — R29.3 POSTURAL IMBALANCE: ICD-10-CM

## 2024-01-09 PROCEDURE — 97110 THERAPEUTIC EXERCISES: CPT

## 2024-01-09 NOTE — PROGRESS NOTES
[facility]   Physical Therapy Treatment Note      Name: Yesi Porter  Clinic Number: [id number]    Therapy Diagnosis:   Encounter Diagnoses   Name Primary?    Muscle weakness of lower extremity Yes    Postural imbalance     Impaired functional mobility, balance, and endurance        Physician: Mitzi Dow F*    Visit Date: 1/9/2024     Physician Orders: PT Eval and Treat   Medical Diagnosis from Referral: M25.561 chronic pain of R knee  Evaluation Date: 11/30/2023  Authorization Period Expiration: 1/19/2024  Plan of Care Expiration: 1/19/2024  Reassessment / Plan of Care completed: RA- 12/29/2023   Visit # / Visits authorized: 7 / 12   FOTO: 2/ 3 (25, 34, 40)     Precautions: Standard      Time In: 1518  Time Out: 1558  Total Billable Time: 40 minutes    PTA Visit #: 2/5     Subjective     Patient reports: no pain in R knee. States that she has occasional soreness in R knee .   She was compliant with home exercise program.  Response to previous treatment: good  Functional change: increased strength     Pain: 0/10  Location: right knee      Objective      FOTO: 40    Treatment     Yesi received the treatments listed below:      therapeutic exercises to develop strength and endurance for 30 minutes including:     Therapeutic Exercise Grid     Exercise 1  Exercise 2  Exercise 3  Exercise 4    Exercise :    Muscle energy (resist R hip flexion) Bridging with ball / TB   3 way hip   R clamshell     Repetition/Time :    3   2 x 10 each   2 x 10   2 x 10      Resist or Assist :       Green TB   Red TB        Comment :    Level today             Done :                            Exercise 5  Exercise 6  Exercise 7  Exercise 8    Exercise :    Hamstring curls, LAQ  Step ups: forward/lateral   STS   R knee mobilizations    Repetition/Time :    20 10 each   17   X 10     Resist or Assist :     2.5 lbs              Comment :    LAQ only     No UE assist   All planes    Done :    YES   YES                          Exercise 9  Exercise 10  Exercise 11  Exercise 12    Exercise :    Instrument assisted soft tissue mobilizations    Balance: foam: SLS- bilateral      BOSU: static  squats     Repetition/Time :        6 x 20 sec   10     Resist or Assist :                  Comment :    To R distal quat above the patella.   Alternating           Done :    no                       Exercise 13 Exercise 14  Exercise 15  Exercise 16   Exercise :    QS   SAQ   S/L R hip ABD   Stretch R hamstring     Repetition/Time :    3 x 10   X 20   X 20 3 x 20     Resist or Assist :    3-5-7 sec hold     2.5#   2.5#        Comment :                  Done :    YES   YES   YES   YES                     Exercise 17 Exercise 18 Exercise 19 Exercise 20    Exercise :    Stretch R rectus/Quad   Step downs  -fwd  -retro   Squats   Nusttep     Repetition/Time :    3 x 20 sec   10   2  x 10 7 mins     Resist or Assist :             L3   .25 miles    Comment :                  Done :       YES       YES                       Patient Education and Home Exercises       Education provided:   -reviewed HEP    Written Home Exercises Provided: yes. Exercises were reviewed and Yesi was able to demonstrate them prior to the end of the session.  Yesi demonstrated good  understanding of the education provided. See Electronic Medical Record under Patient Instructions for exercises provided during therapy sessions    Assessment     Yesi tolerated treatment well today completing all assigned exercises with good effort and reporting  in bilateral knee no discomfort  during treatment session. Patient required moderate verbal cues and minimal tactile cues for proper exercise execution. Noted improved R lower extremity control during exercise with additional resistance . Patient was able to perform eccentric exercises with good control and pace.           Yesi Is progressing well towards her goals.   Patient prognosis is Excellent.     Patient will continue to  benefit from skilled outpatient physical therapy to address the deficits listed in the problem list box on initial evaluation, provide pt/family education and to maximize pt's level of independence in the home and community environment.     Patient's spiritual, cultural and educational needs considered and pt agreeable to plan of care and goals.     Anticipated barriers to physical therapy:  R knee pain, R leg weakness, impaired balance, chronic R SI dysfunction    Goals:   Short Term Goals: in 4 weeks:   1. Patient's complaints of pain will be </= 4/10 with activity.- progressing (c/o 3/10 after last PT session which reduced her pain)  2. FOTO score will be >/= 35% for improved perception of functional mobility.- progressing (34)  3. R LE muscle strength will improve by 1/2 muscle grade or more for improved stability.- met (4/5 MMT throughout)  4. 5xSTS will be </= 11seconds.- progressing  5. Patient will be knowledgeable and compliant in home exercise program to continue at home for progressive strengthening.- progressing (pt performs a few exercises outside of therapy)     Long Term Goals: in 6 weeks:  1. Patient's complaints of pain will be </= 2/10 with activities.- progressing (c/o 3/10 after last PT session which reduced her pain)  2. Patient will be independent in Home Exercise Program.- progressing (pt performs a few exercises outside of therapy)   3. L LE strength will improve to >/= 4+/5 for improved stability during functional mobility.- progressing  (4/5 MMT throughout)  4. FOTO score will be >/= 59% for improved perception of functional mobility.- progressing (34)  5. Single leg stance on R LE will be >/= 25 seconds.- met (25 secs)  6. MCTSIB will improve to >/= 3/4 for safer community ambulation.- met (4/4 30,30,30,30)    Plan      Outpatient Physical Therapy 2 times weekly for 6 weeks to include the following interventions: Neuromuscular Re-ed, Patient Education, and Therapeutic Exercise.      Laci  Amari, PT

## 2024-01-11 ENCOUNTER — CLINICAL SUPPORT (OUTPATIENT)
Dept: REHABILITATION | Facility: HOSPITAL | Age: 29
End: 2024-01-11
Attending: NURSE PRACTITIONER
Payer: COMMERCIAL

## 2024-01-11 DIAGNOSIS — R29.3 POSTURAL IMBALANCE: ICD-10-CM

## 2024-01-11 DIAGNOSIS — M62.81 MUSCLE WEAKNESS OF LOWER EXTREMITY: Primary | ICD-10-CM

## 2024-01-11 DIAGNOSIS — Z74.09 IMPAIRED FUNCTIONAL MOBILITY, BALANCE, AND ENDURANCE: ICD-10-CM

## 2024-01-11 PROCEDURE — 97110 THERAPEUTIC EXERCISES: CPT | Mod: CQ

## 2024-01-11 NOTE — PROGRESS NOTES
[facility]   Physical Therapy Treatment Note      Name: Yesi Porter  Clinic Number: [id number]    Therapy Diagnosis:   Encounter Diagnoses   Name Primary?    Muscle weakness of lower extremity Yes    Postural imbalance     Impaired functional mobility, balance, and endurance        Physician: Mitzi Dow F*    Visit Date: 1/11/2024     Physician Orders: PT Eval and Treat   Medical Diagnosis from Referral: M25.561 chronic pain of R knee  Evaluation Date: 11/30/2023  Authorization Period Expiration: 1/19/2024  Plan of Care Expiration: 1/19/2024  Reassessment / Plan of Care completed: RA- 12/29/2023   Visit # / Visits authorized: 8 / 12        Precautions: Standard      Time In: 1535  Time Out: 1605  Total Billable Time: 30 minutes    PTA Visit #: 3/5     Subjective     Patient reports: increased soreness above right knee.   She was compliant with home exercise program.  Response to previous treatment: sore  Functional change: increased strength     Pain: 5/10  Location: right knee      Objective      N/A    Treatment     Yesi received the treatments listed below:      therapeutic exercises to develop strength and endurance for 30 minutes including:     Therapeutic Exercise Grid     Exercise 1  Exercise 2  Exercise 3  Exercise 4    Exercise :    Muscle energy (resist R hip flexion) Bridging with ball / TB   3 way hip   R clamshell     Repetition/Time :    3   2 x 10 each   2 x 10   2 x 10      Resist or Assist :       Green TB   Red TB        Comment :    Level today             Done :                            Exercise 5  Exercise 6  Exercise 7  Exercise 8    Exercise :    Hamstring curls, LAQ  Step ups: forward/lateral   STS   R knee mobilizations    Repetition/Time :    20 10 each   17   X 10     Resist or Assist :     2.5 lbs              Comment :    LAQ only     No UE assist   All planes    Done :    yes yes                         Exercise 9  Exercise 10  Exercise 11   Exercise 12    Exercise :    Instrument assisted soft tissue mobilizations    Balance: foam: SLS- bilateral     Hip hurdles BOSU: static  squats     Repetition/Time :        6 x 20 sec  2x10 10     Resist or Assist :            #2.5      Comment :    To R distal quat above the patella.   Alternating           Done :       yes                  Exercise 13 Exercise 14  Exercise 15  Exercise 16   Exercise :    QS   SAQ   S/L R hip ABD   Stretch R hamstring     Repetition/Time :    3 x 10   X 20   X 20 3 x 20     Resist or Assist :    3-5-7 sec hold     2.5#   2.5#        Comment :                  Done :    YES   YES   YES   yes                     Exercise 17 Exercise 18 Exercise 19 Exercise 20    Exercise :    Stretch R rectus/Quad   Step downs  -fwd  -retro   Squats   Nusttep     Repetition/Time :    3 x 20 sec   10   2  x 10 7 mins     Resist or Assist :             L3   .25 miles    Comment :                  Done :    yes   YES       no                    Patient Education and Home Exercises       Education provided:   -reviewed HEP    Written Home Exercises Provided: yes. Exercises were reviewed and Yesi was able to demonstrate them prior to the end of the session.  Yesi demonstrated good  understanding of the education provided. See Electronic Medical Record under Patient Instructions for exercises provided during therapy sessions    Assessment     Patient performed all exercises and stretches with good effort. Upon arrival Yesi reported increased soreness in lower R quad area. Yesi required minimal verbal cues for proper technique and eccentric quad control. Encouraged patient to apply ice/heat to R knee to alleviate soreness.     Yesi Is progressing well towards her goals.   Patient prognosis is Excellent.     Patient will continue to benefit from skilled outpatient physical therapy to address the deficits listed in the problem list box on initial evaluation, provide pt/family education and to maximize pt's level  of independence in the home and community environment.     Patient's spiritual, cultural and educational needs considered and pt agreeable to plan of care and goals.     Anticipated barriers to physical therapy:  R knee pain, R leg weakness, impaired balance, chronic R SI dysfunction    Goals:   Short Term Goals: in 4 weeks:   1. Patient's complaints of pain will be </= 4/10 with activity.- progressing (c/o 3/10 after last PT session which reduced her pain)  2. FOTO score will be >/= 35% for improved perception of functional mobility.- progressing (34)  3. R LE muscle strength will improve by 1/2 muscle grade or more for improved stability.- met (4/5 MMT throughout)  4. 5xSTS will be </= 11seconds.- progressing  5. Patient will be knowledgeable and compliant in home exercise program to continue at home for progressive strengthening.- progressing (pt performs a few exercises outside of therapy)     Long Term Goals: in 6 weeks:  1. Patient's complaints of pain will be </= 2/10 with activities.- progressing (c/o 3/10 after last PT session which reduced her pain)  2. Patient will be independent in Home Exercise Program.- progressing (pt performs a few exercises outside of therapy)   3. L LE strength will improve to >/= 4+/5 for improved stability during functional mobility.- progressing  (4/5 MMT throughout)  4. FOTO score will be >/= 59% for improved perception of functional mobility.- progressing (34)  5. Single leg stance on R LE will be >/= 25 seconds.- met (25 secs)  6. MCTSIB will improve to >/= 3/4 for safer community ambulation.- met (4/4 30,30,30,30)    Plan      Outpatient Physical Therapy 2 times weekly for 6 weeks to include the following interventions: Neuromuscular Re-ed, Patient Education, and Therapeutic Exercise.      Gladis Herring, PTA

## 2024-01-14 ENCOUNTER — HOSPITAL ENCOUNTER (EMERGENCY)
Facility: HOSPITAL | Age: 29
Discharge: HOME | End: 2024-01-14
Attending: FAMILY MEDICINE
Payer: COMMERCIAL

## 2024-01-14 VITALS
DIASTOLIC BLOOD PRESSURE: 74 MMHG | HEART RATE: 89 BPM | BODY MASS INDEX: 33.13 KG/M2 | OXYGEN SATURATION: 99 % | SYSTOLIC BLOOD PRESSURE: 117 MMHG | WEIGHT: 180 LBS | TEMPERATURE: 98 F | RESPIRATION RATE: 18 BRPM | HEIGHT: 62 IN

## 2024-01-14 DIAGNOSIS — G44.209 TENSION HEADACHE: Primary | ICD-10-CM

## 2024-01-14 LAB
ALBUMIN SERPL-MCNC: 4.1 G/DL (ref 3.5–5)
ALBUMIN/GLOB SERPL: 1 RATIO (ref 1.1–2)
ALP SERPL-CCNC: 65 UNIT/L (ref 40–150)
ALT SERPL-CCNC: 17 UNIT/L (ref 0–55)
APPEARANCE UR: CLEAR
AST SERPL-CCNC: 15 UNIT/L (ref 5–34)
B-HCG SERPL QL: NEGATIVE
BACTERIA #/AREA URNS AUTO: NORMAL /HPF
BASOPHILS # BLD AUTO: 0.03 X10(3)/MCL
BASOPHILS NFR BLD AUTO: 0.3 %
BILIRUB SERPL-MCNC: 0.4 MG/DL
BILIRUB UR QL STRIP.AUTO: NEGATIVE
BUN SERPL-MCNC: 10 MG/DL (ref 7–18.7)
CALCIUM SERPL-MCNC: 9.6 MG/DL (ref 8.4–10.2)
CHLORIDE SERPL-SCNC: 104 MMOL/L (ref 98–107)
CO2 SERPL-SCNC: 24 MMOL/L (ref 22–29)
COLOR UR AUTO: YELLOW
CREAT SERPL-MCNC: 0.76 MG/DL (ref 0.55–1.02)
EOSINOPHIL # BLD AUTO: 0.06 X10(3)/MCL (ref 0–0.9)
EOSINOPHIL NFR BLD AUTO: 0.5 %
ERYTHROCYTE [DISTWIDTH] IN BLOOD BY AUTOMATED COUNT: 13.1 % (ref 11.5–17)
FLUAV AG UPPER RESP QL IA.RAPID: NOT DETECTED
FLUBV AG UPPER RESP QL IA.RAPID: NOT DETECTED
GFR SERPLBLD CREATININE-BSD FMLA CKD-EPI: >60 MLS/MIN/1.73/M2
GLOBULIN SER-MCNC: 4 GM/DL (ref 2.4–3.5)
GLUCOSE SERPL-MCNC: 81 MG/DL (ref 74–100)
GLUCOSE UR QL STRIP.AUTO: NEGATIVE
HCT VFR BLD AUTO: 39.3 % (ref 37–47)
HGB BLD-MCNC: 12.1 G/DL (ref 12–16)
IMM GRANULOCYTES # BLD AUTO: 0.01 X10(3)/MCL (ref 0–0.04)
IMM GRANULOCYTES NFR BLD AUTO: 0.1 %
KETONES UR QL STRIP.AUTO: NEGATIVE
LEUKOCYTE ESTERASE UR QL STRIP.AUTO: NEGATIVE
LYMPHOCYTES # BLD AUTO: 2.69 X10(3)/MCL (ref 0.6–4.6)
LYMPHOCYTES NFR BLD AUTO: 22.6 %
MCH RBC QN AUTO: 25.5 PG (ref 27–31)
MCHC RBC AUTO-ENTMCNC: 30.8 G/DL (ref 33–36)
MCV RBC AUTO: 82.9 FL (ref 80–94)
MONOCYTES # BLD AUTO: 0.97 X10(3)/MCL (ref 0.1–1.3)
MONOCYTES NFR BLD AUTO: 8.2 %
NEUTROPHILS # BLD AUTO: 8.12 X10(3)/MCL (ref 2.1–9.2)
NEUTROPHILS NFR BLD AUTO: 68.3 %
NITRITE UR QL STRIP.AUTO: NEGATIVE
PH UR STRIP.AUTO: 8.5 [PH]
PLATELET # BLD AUTO: 328 X10(3)/MCL (ref 130–400)
PMV BLD AUTO: 10.4 FL (ref 7.4–10.4)
POTASSIUM SERPL-SCNC: 3.5 MMOL/L (ref 3.5–5.1)
PROT SERPL-MCNC: 8.1 GM/DL (ref 6.4–8.3)
PROT UR QL STRIP.AUTO: NEGATIVE
RBC # BLD AUTO: 4.74 X10(6)/MCL (ref 4.2–5.4)
RBC #/AREA URNS AUTO: NORMAL /HPF
RBC UR QL AUTO: NEGATIVE
RSV A 5' UTR RNA NPH QL NAA+PROBE: NOT DETECTED
SARS-COV-2 RNA RESP QL NAA+PROBE: NOT DETECTED
SODIUM SERPL-SCNC: 139 MMOL/L (ref 136–145)
SP GR UR STRIP.AUTO: 1.01 (ref 1–1.03)
SQUAMOUS #/AREA URNS AUTO: NORMAL /HPF
UROBILINOGEN UR STRIP-ACNC: 0.2
WBC # SPEC AUTO: 11.88 X10(3)/MCL (ref 4.5–11.5)
WBC #/AREA URNS AUTO: NORMAL /HPF

## 2024-01-14 PROCEDURE — 63600175 PHARM REV CODE 636 W HCPCS: Performed by: SPECIALIST

## 2024-01-14 PROCEDURE — 96361 HYDRATE IV INFUSION ADD-ON: CPT

## 2024-01-14 PROCEDURE — 85025 COMPLETE CBC W/AUTO DIFF WBC: CPT | Performed by: FAMILY MEDICINE

## 2024-01-14 PROCEDURE — 96375 TX/PRO/DX INJ NEW DRUG ADDON: CPT

## 2024-01-14 PROCEDURE — 81003 URINALYSIS AUTO W/O SCOPE: CPT | Performed by: FAMILY MEDICINE

## 2024-01-14 PROCEDURE — 96374 THER/PROPH/DIAG INJ IV PUSH: CPT

## 2024-01-14 PROCEDURE — 0241U COVID/RSV/FLU A&B PCR: CPT | Performed by: FAMILY MEDICINE

## 2024-01-14 PROCEDURE — 80053 COMPREHEN METABOLIC PANEL: CPT | Performed by: FAMILY MEDICINE

## 2024-01-14 PROCEDURE — 81025 URINE PREGNANCY TEST: CPT | Performed by: FAMILY MEDICINE

## 2024-01-14 PROCEDURE — 63600175 PHARM REV CODE 636 W HCPCS: Performed by: FAMILY MEDICINE

## 2024-01-14 PROCEDURE — 99285 EMERGENCY DEPT VISIT HI MDM: CPT | Mod: 25

## 2024-01-14 PROCEDURE — 25000003 PHARM REV CODE 250: Performed by: FAMILY MEDICINE

## 2024-01-14 RX ORDER — METOCLOPRAMIDE HYDROCHLORIDE 5 MG/ML
10 INJECTION INTRAMUSCULAR; INTRAVENOUS
Status: COMPLETED | OUTPATIENT
Start: 2024-01-14 | End: 2024-01-14

## 2024-01-14 RX ORDER — DIPHENHYDRAMINE HYDROCHLORIDE 50 MG/ML
25 INJECTION INTRAMUSCULAR; INTRAVENOUS
Status: COMPLETED | OUTPATIENT
Start: 2024-01-14 | End: 2024-01-14

## 2024-01-14 RX ORDER — KETOROLAC TROMETHAMINE 30 MG/ML
30 INJECTION, SOLUTION INTRAMUSCULAR; INTRAVENOUS
Status: COMPLETED | OUTPATIENT
Start: 2024-01-14 | End: 2024-01-14

## 2024-01-14 RX ORDER — BUTALBITAL, ACETAMINOPHEN AND CAFFEINE 50; 325; 40 MG/1; MG/1; MG/1
1 TABLET ORAL EVERY 4 HOURS PRN
Qty: 15 TABLET | Refills: 0 | Status: SHIPPED | OUTPATIENT
Start: 2024-01-14 | End: 2024-02-13

## 2024-01-14 RX ADMIN — DIPHENHYDRAMINE HYDROCHLORIDE 25 MG: 50 INJECTION INTRAMUSCULAR; INTRAVENOUS at 07:01

## 2024-01-14 RX ADMIN — KETOROLAC TROMETHAMINE 30 MG: 30 INJECTION, SOLUTION INTRAMUSCULAR; INTRAVENOUS at 05:01

## 2024-01-14 RX ADMIN — METOCLOPRAMIDE 10 MG: 5 INJECTION, SOLUTION INTRAMUSCULAR; INTRAVENOUS at 07:01

## 2024-01-14 RX ADMIN — SODIUM CHLORIDE 1000 ML: 9 INJECTION, SOLUTION INTRAVENOUS at 05:01

## 2024-01-14 NOTE — Clinical Note
"Yesi "Yesi" German was seen and treated in our emergency department on 1/14/2024.  She may return to work on 01/17/2024.       If you have any questions or concerns, please don't hesitate to call.      Dr Mccullough/Maryjane RN RN    "

## 2024-01-14 NOTE — ED PROVIDER NOTES
Encounter Date: 2024       History     Chief Complaint   Patient presents with    Headache     R sided headache since last pm with N,V today      Headache  28-year-old female patient comes in with complaint of right-sided headache since last night patient had nausea vomiting today no fever chills or night sweats no neurological signs patient otherwise is her own history source no chronic conditions contributing to today's episode        Review of patient's allergies indicates:  No Known Allergies  Past Medical History:   Diagnosis Date    Depression      Past Surgical History:   Procedure Laterality Date    ABCESS DRAINAGE       SECTION       Family History   Problem Relation Age of Onset    Atrial fibrillation Mother     Arrhythmia Mother     Liver cancer Maternal Grandfather     Lung cancer Paternal Grandmother      Social History     Tobacco Use    Smoking status: Never    Smokeless tobacco: Never   Substance Use Topics    Alcohol use: Not Currently     Comment: on occas    Drug use: Never     Review of Systems   Constitutional:  Negative for fever.   HENT:  Negative for sore throat.    Respiratory:  Negative for shortness of breath.    Cardiovascular:  Negative for chest pain.   Gastrointestinal:  Negative for nausea.   Genitourinary:  Negative for dysuria.   Musculoskeletal:  Negative for back pain.   Skin:  Negative for rash.   Neurological:  Positive for headaches. Negative for weakness.   Hematological:  Does not bruise/bleed easily.   All other systems reviewed and are negative.      Physical Exam     Initial Vitals [24 1725]   BP Pulse Resp Temp SpO2   (!) 145/97 (!) 115 18 98 °F (36.7 °C) 96 %      MAP       --         Physical Exam    Nursing note and vitals reviewed.  Constitutional: She appears well-developed.   HENT:   Head: Normocephalic and atraumatic.   Right Ear: External ear normal.   Left Ear: External ear normal.   Nose: Nose normal.   Mouth/Throat: Oropharynx is clear and  moist. No oropharyngeal exudate.   Eyes: Conjunctivae and EOM are normal. Pupils are equal, round, and reactive to light. Right eye exhibits no discharge. Left eye exhibits no discharge.   Neck: Neck supple. No tracheal deviation present. No JVD present.   Normal range of motion.  Cardiovascular:  Normal rate, regular rhythm, normal heart sounds and intact distal pulses.     Exam reveals no gallop and no friction rub.       No murmur heard.  Pulmonary/Chest: Breath sounds normal. No stridor. No respiratory distress. She has no wheezes. She has no rhonchi. She has no rales.   Abdominal: Abdomen is soft. Bowel sounds are normal. She exhibits no distension and no mass. There is no abdominal tenderness. There is no rebound and no guarding.   Musculoskeletal:         General: Normal range of motion.      Cervical back: Normal range of motion and neck supple.     Neurological: She is alert and oriented to person, place, and time. She has normal strength. No cranial nerve deficit. GCS score is 15. GCS eye subscore is 4. GCS verbal subscore is 5. GCS motor subscore is 6.   Skin: Skin is warm and dry. No rash and no abscess noted. No erythema.   Psychiatric: She has a normal mood and affect. Her behavior is normal. Judgment and thought content normal.         ED Course   Procedures  Labs Reviewed   COMPREHENSIVE METABOLIC PANEL - Abnormal; Notable for the following components:       Result Value    Globulin 4.0 (*)     Albumin/Globulin Ratio 1.0 (*)     All other components within normal limits   CBC WITH DIFFERENTIAL - Abnormal; Notable for the following components:    WBC 11.88 (*)     MCH 25.5 (*)     MCHC 30.8 (*)     All other components within normal limits   COVID/RSV/FLU A&B PCR - Normal    Narrative:     The Xpert Xpress SARS-CoV-2/FLU/RSV plus is a rapid, multiplexed real-time PCR test intended for the simultaneous qualitative detection and differentiation of SARS-CoV-2, Influenza A, Influenza B, and respiratory  syncytial virus (RSV) viral RNA in either nasopharyngeal swab or nasal swab specimens.         PREGNANCY TEST, URINE RAPID - Normal   URINALYSIS, REFLEX TO URINE CULTURE - Normal   URINALYSIS, MICROSCOPIC - Normal   CBC W/ AUTO DIFFERENTIAL    Narrative:     The following orders were created for panel order CBC auto differential.  Procedure                               Abnormality         Status                     ---------                               -----------         ------                     CBC with Differential[[id number]]       Abnormal            Final result                 Please view results for these tests on the individual orders.          Imaging Results              CT Head Without Contrast (Final result)  Result time 01/14/24 19:05:29      Final result by Magdy Branch MD (01/14/24 19:05:29)                   Impression:      No acute intracranial findings identified.      Electronically signed by: Magdy Branch  Date:    01/14/2024  Time:    19:05               Narrative:    EXAMINATION:  CT HEAD WITHOUT CONTRAST    CLINICAL HISTORY:  Dizziness, nonspecific.    TECHNIQUE:  Sequential axial images were performed of the brain from skull base to vertex without contrast.    Dose length product was 1105 mGycm. Automated exposure control was utilized to minimize radiation dose.    COMPARISON:  February 15, 2023    FINDINGS:  The gray white matter differentiation is unremarkable. There is no intracranial hemorrhage, hydrocephalus, midline shift or mass effect. No acute extra axial fluid collections identified. Visualized paranasal sinuses and the mastoid air cells are well aerated.                                       X-Ray Chest AP Portable (Final result)  Result time 01/14/24 19:21:42      Final result by Magdy Branch MD (01/14/24 19:21:42)                   Impression:      NO ACUTE CARDIOPULMONARY PROCESS IDENTIFIED.      Electronically signed by: Magdy  Branch  Date:    01/14/2024  Time:    19:21               Narrative:    EXAMINATION:  XR CHEST AP PORTABLE    CLINICAL HISTORY:  Chest Pain;    TECHNIQUE:  One    COMPARISON:  February 15, 2023.    FINDINGS:  Cardiopericardial silhouette is within normal limits. Lungs are without dense focal or segmental consolidation, congestive process, pleural effusions or pneumothorax.                        Wet Read by Fei Wakefield MD (01/14/24 19:15:02, [facility] - Emergency Dept, Emergency Medicine)    No acute cardiopulmonary process                                     Medications   sodium chloride 0.9% bolus 1,000 mL 1,000 mL (0 mLs Intravenous Stopped 1/14/24 1831)   ketorolac injection 30 mg (30 mg Intravenous Given 1/14/24 1751)   metoclopramide injection 10 mg (10 mg Intravenous Given 1/14/24 1940)   diphenhydrAMINE injection 25 mg (25 mg Intravenous Given 1/14/24 1940)     Medical Decision Making  Headache tension headache migraine head injury    I, DR. WAKEFIELD, ASSUMED CARE OF THIS PATIENT AT 6:00 P.M.; patient's headache was slightly improved after Toradol, workup pending    Amount and/or Complexity of Data Reviewed  Labs: ordered. Decision-making details documented in ED Course.  Radiology: ordered and independent interpretation performed. Decision-making details documented in ED Course.    Risk  Prescription drug management.  Risk Details: Labs and CT head unremarkable and patient given Reglan 10 IV and Benadryl 25 mg IV; headache much improved; a prescription for Fioricet was prescribed                   Patient Vitals for the past 24 hrs:   BP Temp Temp src Pulse Resp SpO2 Height Weight   01/14/24 2032 117/74 -- -- -- -- -- -- --   01/14/24 2001 121/77 -- -- -- -- -- -- --   01/14/24 1940 -- -- -- 89 -- 99 % -- --   01/14/24 1931 103/69 -- -- -- -- -- -- --   01/14/24 1930 -- -- -- 90 -- 100 % -- --   01/14/24 1901 123/74 -- -- 102 -- 100 % -- --   01/14/24 1725 (!) 145/97 98 °F (36.7 °C) Oral (!) 115  "18 96 % 5' 2" (1.575 m) 81.6 kg (180 lb)          The patient is resting comfortably and in no acute distress.  She states that her symptoms have improved after treatment in Emergency Department. I personally discussed her test results and treatment plan.  Gave strict ED precautions.  Specific conditions for return to the emergency department and importance of follow up with her primary care provided or the physician listed on the discharge instructions.  Patient voices understanding and agrees to the plan discussed. All patients' questions have been answered at this time.   She has remained hemodynamically stable throughout entire stay in ED and is stable for discharge home.              Clinical Impression:  Final diagnoses:  [G44.209] Tension headache (Primary)          ED Disposition Condition    Discharge Stable          ED Prescriptions       Medication Sig Dispense Start Date End Date Auth. Provider    butalbital-acetaminophen-caffeine -40 mg (FIORICET, ESGIC) -40 mg per tablet Take 1 tablet by mouth every 4 (four) hours as needed for Pain. 15 tablet 1/14/2024 2/13/2024 Fei Mccullough MD          Follow-up Information       Follow up With Specialties Details Why Contact Info    Mitzi Dow, P Family [facility] In 1 week  [address]  [address] [facility] [address]  [phone]               Fei Mccullough MD  01/14/24 3466    "

## 2024-01-18 ENCOUNTER — CLINICAL SUPPORT (OUTPATIENT)
Dept: REHABILITATION | Facility: HOSPITAL | Age: 29
End: 2024-01-18
Payer: COMMERCIAL

## 2024-01-18 DIAGNOSIS — M62.81 MUSCLE WEAKNESS OF LOWER EXTREMITY: Primary | ICD-10-CM

## 2024-01-18 DIAGNOSIS — Z74.09 IMPAIRED FUNCTIONAL MOBILITY, BALANCE, AND ENDURANCE: ICD-10-CM

## 2024-01-18 DIAGNOSIS — R29.3 POSTURAL IMBALANCE: ICD-10-CM

## 2024-01-18 PROCEDURE — 97110 THERAPEUTIC EXERCISES: CPT

## 2024-01-19 ENCOUNTER — CLINICAL SUPPORT (OUTPATIENT)
Dept: REHABILITATION | Facility: HOSPITAL | Age: 29
End: 2024-01-19
Attending: NURSE PRACTITIONER
Payer: COMMERCIAL

## 2024-01-19 DIAGNOSIS — Z74.09 IMPAIRED FUNCTIONAL MOBILITY, BALANCE, AND ENDURANCE: ICD-10-CM

## 2024-01-19 DIAGNOSIS — R29.3 POSTURAL IMBALANCE: ICD-10-CM

## 2024-01-19 DIAGNOSIS — M62.81 MUSCLE WEAKNESS OF LOWER EXTREMITY: Primary | ICD-10-CM

## 2024-01-19 PROCEDURE — 97110 THERAPEUTIC EXERCISES: CPT

## 2024-01-19 NOTE — PLAN OF CARE
[facility]  PT Discharge Note    Name: Yesi Porter  [facility] Number: [id number]    Therapy Diagnosis:   Encounter Diagnoses   Name Primary?    Muscle weakness of lower extremity Yes    Postural imbalance     Impaired functional mobility, balance, and endurance      Physician: Mitzi Dow F*    Physician Orders: PT Eval and Treat   Medical Diagnosis from Referral: M25.561 chronic pain of R knee  Evaluation Date: 11/30/2023  Authorization Period Expiration: 1/19/2024  Plan of Care Expiration: 1/19/2024  Reassessment / Plan of Care completed: RA- 12/29/2023   Visit # / Visits authorized: 9 / 12         Precautions: Standard      Time In: 1300  Time Out: 1322  Total Billable Time: 22 minutes      Date of Last visit: 01/19/24  Total Visits Received: 10    Subjective:   - patient states that she is coming from work and is pain free today. Reports occasional soreness after completing an entire workday.     Objective:  R knee AROM : 0 deg to 130 deg flexion         L/E MMT Right Left   Hip Flexion 5/5 5/5   Hip Extension 5/5 4-/5   Hip Abduction 5/5 5/5   Hip Adduction 5/5 5/5   Knee Flexion 5/5 5/5   Knee Extension 5/5 5/5   Ankle DF 5/5 5/5        Balance:                   30 second STS = 17 reps                  Single leg stance: R 30 seconds EO, L 30 seconds EO                  MCTSIB= 4/4 (30, 30, 30,30)     Gait: symmetrical pattern with no antalgia noted; gait speed: regular pace 1.48 meters/second  Stairs: reciprocal pattern without UE support ascending and descending.     Flexibility :   - Right hamstring = 85%  - Right quad = 85%  - Right rectus = 75%     Therapeutic Exercise Grid     Exercise 1  Exercise 2  Exercise 3  Exercise 4    Exercise :    Muscle energy (resist R hip flexion) Bridging with ball / TB   3 way hip   R clamshell     Repetition/Time :      2 x 10 each   2 x 10   2 x 10      Resist or Assist :       Green TB   Red TB        Comment :    Level today              Done :                               Exercise 5  Exercise 6  Exercise 7  Exercise 8    Exercise :    Hamstring curls, LAQ  Step ups: forward/lateral   STS   R knee mobilizations    Repetition/Time :    20 10 each   2 x 10  X 10     Resist or Assist :     2.5 lbs              Comment :    LAQ only     No UE assist   All planes    Done :        YES  YES                      Exercise 9  Exercise 10  Exercise 11  Exercise 12    Exercise :    Instrument assisted soft tissue mobilizations    Balance: foam: SLS- bilateral     Hip hurdles BOSU: static  squats     Repetition/Time :        6 x 20 sec  2x10 2 x 10     Resist or Assist :            #2.5      Comment :    To R distal quat above the patella.   Alternating           Done :                               Exercise 13 Exercise 14  Exercise 15  Exercise 16   Exercise :    QS   SAQ   S/L R hip ABD   Stretch R hamstring     Repetition/Time :    3 x 10   X 20   X 20 3 x 20     Resist or Assist :    3-5-7 sec hold     2.5#   2.5#        Comment :                  Done :          YES                      Exercise 17 Exercise 18 Exercise 19 Exercise 20    Exercise :    Stretch R rectus/Quad   Step downs  -fwd  -retro   BOSU  -parallel  - SLS Nusttep     Repetition/Time :    3 x 20 sec   10   3 x 30sec  3 x 20sec 7 mins     Resist or Assist :             L3   .25 miles    Comment :                  Done :    YES                         ASSESSMENT      Yesi has shown good progress with therapy completing a total of 10 visits . Patient has demonstrated full Right knee AROM, strength to 5/5 , and balance as demonstrated in the single leg stand tests. Gait pattern reveals equal stride and step ht with gait speed equaling 1.48 meters per second. No LOB or deviation of path noted. Yesi tolerated treatment well today completing all assigned exercises with good effort and reporting no additional discomfort during treatment session. Patient required no verbal cues and no tactile cues  for proper exercise execution. Patient was able to teach back all stretches and strengthening exercises to be completed at  home.        Discharge reason: Patient is now asymptomatic and Patient has met all of his/her goals        Goals: Short Term Goals: in 4 weeks:   1. Patient's complaints of pain will be </= 4/10 with activity.-MET  2. FOTO score will be >/= 35% for improved perception of functional mobility.- MET  3. R LE muscle strength will improve by 1/2 muscle grade or more for improved stability.- MET  4. 5xSTS will be </= 11seconds.- MET  5. Patient will be knowledgeable and compliant in home exercise program to continue at home for progressive strengthening.- MET     Long Term Goals: in 6 weeks:  1. Patient's complaints of pain will be </= 2/10 with activities.- MET  2. Patient will be independent in Home Exercise Program.- MET   3. L LE strength will improve to >/= 4+/5 for improved stability during functional mobility.- MET  4. FOTO score will be >/= 59% for improved perception of functional mobility.-   5. Single leg stance on R LE will be >/= 25 seconds.- met  6. MCTSIB will improve to >/= 3/4 for safer community ambulation.- met (4/4 30,30,30,30)    PLAN   This patient is discharged from Physical Therapy. Patient discharged with HEP and safety instructions. Patient will contract PT if she has any problems.      Laci Fitzpatrick, PT

## 2024-01-19 NOTE — PROGRESS NOTES
[facility]   Physical Therapy Treatment Note      Name: Yesi Porter  Clinic Number: [id number]    Therapy Diagnosis:   Encounter Diagnoses   Name Primary?    Muscle weakness of lower extremity Yes    Postural imbalance     Impaired functional mobility, balance, and endurance        Physician: Mitzi Dow F*    Visit Date: 1/18/2024     Physician Orders: PT Eval and Treat   Medical Diagnosis from Referral: M25.561 chronic pain of R knee  Evaluation Date: 11/30/2023  Authorization Period Expiration: 1/19/2024  Plan of Care Expiration: 1/19/2024  Reassessment / Plan of Care completed: RA- 12/29/2023   Visit # / Visits authorized: 9 / 12        Precautions: Standard      Time In: 1503  Time Out: 1535  Total Billable Time: 32 minutes    PTA Visit #: 3/5     Subjective     Patient reports: that she is pain free today . Coming straight from work and reports no problems   She was compliant with home exercise program.  Response to previous treatment: good  Functional change: improved pain levels     Pain: 0/10  Location: right knee      Objective      N/A    Treatment     Yesi received the treatments listed below:      therapeutic exercises to develop strength and endurance for 30 minutes including:     Therapeutic Exercise Grid     Exercise 1  Exercise 2  Exercise 3  Exercise 4    Exercise :    Muscle energy (resist R hip flexion) Bridging with ball / TB   3 way hip   R clamshell     Repetition/Time :      2 x 10 each   2 x 10   2 x 10      Resist or Assist :       Green TB   Red TB        Comment :    Level today             Done :    YES                         Exercise 5  Exercise 6  Exercise 7  Exercise 8    Exercise :    Hamstring curls, LAQ  Step ups: forward/lateral   STS   R knee mobilizations    Repetition/Time :    20 10 each   2 x 10  X 10     Resist or Assist :     2.5 lbs              Comment :    LAQ only     No UE assist   All planes    Done :      YES                       Exercise 9  Exercise 10  Exercise 11  Exercise 12    Exercise :    Instrument assisted soft tissue mobilizations    Balance: foam: SLS- bilateral     Hip hurdles BOSU: static  squats     Repetition/Time :        6 x 20 sec  2x10 2 x 10     Resist or Assist :            #2.5      Comment :    To R distal quat above the patella.   Alternating           Done :        YES                  Exercise 13 Exercise 14  Exercise 15  Exercise 16   Exercise :    QS   SAQ   S/L R hip ABD   Stretch R hamstring     Repetition/Time :    3 x 10   X 20   X 20 3 x 20     Resist or Assist :    3-5-7 sec hold     2.5#   2.5#        Comment :                  Done :                           Exercise 17 Exercise 18 Exercise 19 Exercise 20    Exercise :    Stretch R rectus/Quad   Step downs  -fwd  -retro   BOSU  -parallel  - SLS Nusttep     Repetition/Time :    3 x 20 sec   10   3 x 30sec  3 x 20sec 7 mins     Resist or Assist :             L3   .25 miles    Comment :                  Done :    YES   YES                  Patient Education and Home Exercises       Education provided:   -reviewed HEP    Written Home Exercises Provided: yes. Exercises were reviewed and Yesi was able to demonstrate them prior to the end of the session.  Yesi demonstrated good  understanding of the education provided. See Electronic Medical Record under Patient Instructions for exercises provided during therapy sessions    Assessment     Yesi tolerated treatment well today completing all assigned exercises with good effort and reporting no additional discomfort during treatment session. Patient required minimal verbal cues to maintain balance on BOSU and no tactile cues needed. Noted improved lower extremity stability during dynamic balance activities. Patient did experience fatigue with increased reps. No pain noted throughout session. Yesi completed SLS on BOSU unsupported to 5 seconds at a time.        Yesi Is progressing well towards her goals.   Patient  prognosis is Excellent.     Patient will continue to benefit from skilled outpatient physical therapy to address the deficits listed in the problem list box on initial evaluation, provide pt/family education and to maximize pt's level of independence in the home and community environment.     Patient's spiritual, cultural and educational needs considered and pt agreeable to plan of care and goals.     Anticipated barriers to physical therapy:  R knee pain, R leg weakness, impaired balance, chronic R SI dysfunction    Goals:   Short Term Goals: in 4 weeks:   1. Patient's complaints of pain will be </= 4/10 with activity.- progressing (c/o 3/10 after last PT session which reduced her pain)  2. FOTO score will be >/= 35% for improved perception of functional mobility.- progressing (34)  3. R LE muscle strength will improve by 1/2 muscle grade or more for improved stability.- met (4/5 MMT throughout)  4. 5xSTS will be </= 11seconds.- progressing  5. Patient will be knowledgeable and compliant in home exercise program to continue at home for progressive strengthening.- progressing (pt performs a few exercises outside of therapy)     Long Term Goals: in 6 weeks:  1. Patient's complaints of pain will be </= 2/10 with activities.- progressing (c/o 3/10 after last PT session which reduced her pain)  2. Patient will be independent in Home Exercise Program.- progressing (pt performs a few exercises outside of therapy)   3. L LE strength will improve to >/= 4+/5 for improved stability during functional mobility.- progressing  (4/5 MMT throughout)  4. FOTO score will be >/= 59% for improved perception of functional mobility.- progressing (34)  5. Single leg stance on R LE will be >/= 25 seconds.- met (25 secs)  6. MCTSIB will improve to >/= 3/4 for safer community ambulation.- met (4/4 30,30,30,30)    Plan      Outpatient Physical Therapy 2 times weekly for 6 weeks to include the following interventions: Neuromuscular Re-ed,  Patient Education, and Therapeutic Exercise. \  Plan is for discharge at next visit if no changes      Laci Fitzpatrick, PT

## 2024-02-07 ENCOUNTER — OFFICE VISIT (OUTPATIENT)
Dept: FAMILY MEDICINE | Facility: CLINIC | Age: 29
End: 2024-02-07
Payer: COMMERCIAL

## 2024-02-07 DIAGNOSIS — Z00.00 WELL ADULT EXAM: Primary | ICD-10-CM

## 2024-02-07 DIAGNOSIS — F41.9 ANXIETY: ICD-10-CM

## 2024-02-07 PROCEDURE — 1160F RVW MEDS BY RX/DR IN RCRD: CPT | Mod: CPTII,95,, | Performed by: NURSE PRACTITIONER

## 2024-02-07 PROCEDURE — 99213 OFFICE O/P EST LOW 20 MIN: CPT | Mod: 95,,, | Performed by: NURSE PRACTITIONER

## 2024-02-07 PROCEDURE — 1159F MED LIST DOCD IN RCRD: CPT | Mod: CPTII,95,, | Performed by: NURSE PRACTITIONER

## 2024-02-07 RX ORDER — HYDROXYZINE PAMOATE 25 MG/1
25 CAPSULE ORAL NIGHTLY
Qty: 30 CAPSULE | Refills: 6 | Status: SHIPPED | OUTPATIENT
Start: 2024-02-07

## 2024-02-07 NOTE — PROGRESS NOTES
Patient ID: [id number]     Chief Complaint: Anxiety (4 wk fu) and Depression      HPI:     This is a telemedicine note. Patient was treated using telemedicine, real time audio and video, according to [facility] protocols. Mitzi ARAIZA, conducted the visit from the [facility]. The patient participated in the visit at a non-[facility] location selected by the patient, identified below. I am licensed in the state where the patient stated they are located. The patient stated that they understood and accepted the privacy and security risks to their information at their location. This visit is not recorded.    Patient was located in [place], [place].    Yesi Porter is a 28 y.o. female here today for a telemedicine visit.  Patient reports hydroxyzine has been effective in managing anxiety.  Denies SI/HI.      Past Medical History:  has a past medical history of Depression.    Surgical History:  has a past surgical history that includes  section and Abscess drainage.    Family History: family history includes Arrhythmia in her mother; Atrial fibrillation in her mother; Liver cancer in her maternal grandfather; Lung cancer in her paternal grandmother.    Social History:  reports that she has never smoked. She has never used smokeless tobacco. She reports that she does not currently use alcohol. She reports that she does not use drugs.    Current Outpatient Medications   Medication Instructions    butalbital-acetaminophen-caffeine -40 mg (FIORICET, ESGIC) -40 mg per tablet 1 tablet, Oral, Every 4 hours PRN    hydrOXYzine pamoate (VISTARIL) 25 mg, Oral, Nightly       Patient has No Known Allergies.     Patient Care Team:  Mitzi Dow FNP as PCP - General (Nurse Practitioner)  Harini Carmona LPN as Care Coordinator     Subjective:     Review of Systems    12 point review of systems conducted, negative except as stated in the history of present illness. See  HPI for details.    Objective:     There were no vitals taken for this visit.    Physical Exam    Physical Exam: LIMITED DUE TO TELEMEDICINE RESTRICTIONS.  General: Alert and oriented, No acute distress.  Head: Normocephalic, Atraumatic.  Eye: Sclera non-icteric.  Neck/Thyroid:  Full range of motion.  Respiratory: Non-labored respirations, Symmetrical chest wall expansion.  Musculoskeletal: Normal range of motion.  Integumentary:  No visible suspicious lesions or rashes. No diaphoresis.   Neurologic: No focal deficits  Psychiatric: Normal interaction, Coherent speech, Euthymic mood, Appropriate affect       Assessment:       ICD-10-CM ICD-9-CM   1. Well adult exam  Z00.00 V70.0   2. Anxiety  F41.9 300.00        Plan:     1. Anxiety  Controlled.  Continue current management.  Report to ED with any suicidal or homicidal ideations.  - hydrOXYzine pamoate (VISTARIL) 25 MG Cap; Take 1 capsule (25 mg total) by mouth every evening.  Dispense: 30 capsule; Refill: 6    2. Well adult exam  - CBC Auto Differential; Future  - Comprehensive Metabolic Panel; Future  - Lipid Panel; Future  - TSH; Future  - Hemoglobin A1C; Future  - Urinalysis; Future  - Vitamin D; Future  - Urinalysis      Follow up in about 8 months (around 10/7/2024) for Wellness. In addition to their scheduled follow up, the patient has also been instructed to follow up on as needed basis.     Future Appointments   Date Time Provider Department Center   5/8/2024  9:30 AM Tino Guzman MD [facility]        Video Time Documentation:  Spent 10 minutes with patient face to face discussed health concerns. More than 50% of this time was spent in counseling and coordination of care.    CATY Butt

## 2024-03-12 ENCOUNTER — TELEPHONE (OUTPATIENT)
Dept: FAMILY MEDICINE | Facility: CLINIC | Age: 29
End: 2024-03-12
Payer: COMMERCIAL

## 2024-03-12 NOTE — TELEPHONE ENCOUNTER
----- Message from Tammy Harpreet sent at 3/12/2024 10:20 AM CDT -----  Regarding: Call back  Pt is requesting medication for coughing be called in. Call back number is [phone]

## 2024-03-12 NOTE — TELEPHONE ENCOUNTER
Delsym OTC advised per PCP. Instructed to follow up in clinic if symptoms not relieved. ED precautions given. UV.    ----- Message from [employer] sent at 3/12/2024 10:20 AM CDT -----  Regarding: Call back  Pt is requesting medication for coughing be called in. Call back number is [phone]

## 2024-07-31 PROCEDURE — 87624 HPV HI-RISK TYP POOLED RSLT: CPT | Performed by: OBSTETRICS & GYNECOLOGY

## 2024-07-31 PROCEDURE — 87491 CHLMYD TRACH DNA AMP PROBE: CPT | Performed by: OBSTETRICS & GYNECOLOGY

## 2024-07-31 PROCEDURE — 87591 N.GONORRHOEAE DNA AMP PROB: CPT | Performed by: OBSTETRICS & GYNECOLOGY

## 2024-07-31 PROCEDURE — 87661 TRICHOMONAS VAGINALIS AMPLIF: CPT | Performed by: OBSTETRICS & GYNECOLOGY

## 2024-08-23 ENCOUNTER — TELEPHONE (OUTPATIENT)
Dept: FAMILY MEDICINE | Facility: CLINIC | Age: 29
End: 2024-08-23
Payer: COMMERCIAL

## 2024-08-23 ENCOUNTER — LAB VISIT (OUTPATIENT)
Dept: LAB | Facility: HOSPITAL | Age: 29
End: 2024-08-23
Attending: NURSE PRACTITIONER
Payer: COMMERCIAL

## 2024-08-23 DIAGNOSIS — R30.0 DYSURIA: ICD-10-CM

## 2024-08-23 DIAGNOSIS — R30.0 DYSURIA: Primary | ICD-10-CM

## 2024-08-23 LAB
BACTERIA #/AREA URNS AUTO: ABNORMAL /HPF
BILIRUB UR QL STRIP.AUTO: NEGATIVE
CLARITY UR: CLEAR
COLOR UR AUTO: YELLOW
GLUCOSE UR QL STRIP: NEGATIVE
HGB UR QL STRIP: NEGATIVE
KETONES UR QL STRIP: NEGATIVE
LEUKOCYTE ESTERASE UR QL STRIP: ABNORMAL
NITRITE UR QL STRIP: NEGATIVE
PH UR STRIP: 6 [PH]
PROT UR QL STRIP: NEGATIVE
RBC #/AREA URNS AUTO: ABNORMAL /HPF
SP GR UR STRIP.AUTO: 1.01 (ref 1–1.03)
SQUAMOUS #/AREA URNS AUTO: ABNORMAL /HPF
UROBILINOGEN UR STRIP-ACNC: 0.2
WBC #/AREA URNS AUTO: ABNORMAL /HPF

## 2024-08-23 PROCEDURE — 81003 URINALYSIS AUTO W/O SCOPE: CPT

## 2024-08-23 NOTE — TELEPHONE ENCOUNTER
Pt aware, advised pending results she will be contacted for a  Virtual appt.Understanding voiced.  ----- Message from CATY Butt sent at 8/23/2024 11:37 AM CDT -----  Regarding: RE: UA  UA ordered.  ----- Message -----  From: Evon Umana LPN  Sent: 8/23/2024  11:24 AM CDT  To: CATY Butt  Subject: UA                                               See below. Please advise.  ----- Message -----  From: Rima Donovan  Sent: 8/23/2024  11:12 AM CDT  To: [facility] Staff    .Type:  Needs Medical Advice    Who Called:  pt ( employee)    Symptoms (please be specific):  urinating and burning while urinating      How long has patient had these symptoms:   2 weeks    Pharmacy name and phone #:    [facility], [place] - [address]   Phone: [phone]  Fax: [phone]        Would the patient rather a call back or a response via [facility]?      Best Call Back Number:  [phone]    Additional Information: employee works at [employer] she is asking to put an order in her chart so she can have her urine tested thanks

## 2024-09-26 ENCOUNTER — LAB VISIT (OUTPATIENT)
Dept: LAB | Facility: HOSPITAL | Age: 29
End: 2024-09-26
Attending: NURSE PRACTITIONER
Payer: COMMERCIAL

## 2024-09-26 DIAGNOSIS — Z00.00 WELL ADULT EXAM: ICD-10-CM

## 2024-09-26 LAB
25(OH)D3+25(OH)D2 SERPL-MCNC: 12 NG/ML (ref 30–80)
ALBUMIN SERPL-MCNC: 4.1 G/DL (ref 3.5–5)
ALBUMIN/GLOB SERPL: 1.1 RATIO (ref 1.1–2)
ALP SERPL-CCNC: 47 UNIT/L (ref 40–150)
ALT SERPL-CCNC: 15 UNIT/L (ref 0–55)
ANION GAP SERPL CALC-SCNC: 10 MEQ/L
AST SERPL-CCNC: 12 UNIT/L (ref 5–34)
BASOPHILS # BLD AUTO: 0.05 X10(3)/MCL
BASOPHILS NFR BLD AUTO: 0.4 %
BILIRUB SERPL-MCNC: 0.3 MG/DL
BUN SERPL-MCNC: 9.9 MG/DL (ref 7–18.7)
CALCIUM SERPL-MCNC: 9.7 MG/DL (ref 8.4–10.2)
CHLORIDE SERPL-SCNC: 106 MMOL/L (ref 98–107)
CHOLEST SERPL-MCNC: 144 MG/DL
CHOLEST/HDLC SERPL: 3 {RATIO} (ref 0–5)
CO2 SERPL-SCNC: 23 MMOL/L (ref 22–29)
CREAT SERPL-MCNC: 0.81 MG/DL (ref 0.55–1.02)
CREAT/UREA NIT SERPL: 12
EOSINOPHIL # BLD AUTO: 0.19 X10(3)/MCL (ref 0–0.9)
EOSINOPHIL NFR BLD AUTO: 1.6 %
ERYTHROCYTE [DISTWIDTH] IN BLOOD BY AUTOMATED COUNT: 13.5 % (ref 11.5–17)
EST. AVERAGE GLUCOSE BLD GHB EST-MCNC: 99.7 MG/DL
GFR SERPLBLD CREATININE-BSD FMLA CKD-EPI: >60 ML/MIN/1.73/M2
GLOBULIN SER-MCNC: 3.7 GM/DL (ref 2.4–3.5)
GLUCOSE SERPL-MCNC: 74 MG/DL (ref 74–100)
HBA1C MFR BLD: 5.1 %
HCT VFR BLD AUTO: 38.3 % (ref 37–47)
HDLC SERPL-MCNC: 53 MG/DL (ref 35–60)
HGB BLD-MCNC: 12 G/DL (ref 12–16)
IMM GRANULOCYTES # BLD AUTO: 0.03 X10(3)/MCL (ref 0–0.04)
IMM GRANULOCYTES NFR BLD AUTO: 0.2 %
LDLC SERPL CALC-MCNC: 76 MG/DL (ref 50–140)
LYMPHOCYTES # BLD AUTO: 4.34 X10(3)/MCL (ref 0.6–4.6)
LYMPHOCYTES NFR BLD AUTO: 36 %
MCH RBC QN AUTO: 26.4 PG (ref 27–31)
MCHC RBC AUTO-ENTMCNC: 31.3 G/DL (ref 33–36)
MCV RBC AUTO: 84.4 FL (ref 80–94)
MONOCYTES # BLD AUTO: 0.69 X10(3)/MCL (ref 0.1–1.3)
MONOCYTES NFR BLD AUTO: 5.7 %
NEUTROPHILS # BLD AUTO: 6.74 X10(3)/MCL (ref 2.1–9.2)
NEUTROPHILS NFR BLD AUTO: 56.1 %
NRBC BLD AUTO-RTO: 0 %
PLATELET # BLD AUTO: 380 X10(3)/MCL (ref 130–400)
PMV BLD AUTO: 10.9 FL (ref 7.4–10.4)
POTASSIUM SERPL-SCNC: 4 MMOL/L (ref 3.5–5.1)
PROT SERPL-MCNC: 7.8 GM/DL (ref 6.4–8.3)
RBC # BLD AUTO: 4.54 X10(6)/MCL (ref 4.2–5.4)
SODIUM SERPL-SCNC: 139 MMOL/L (ref 136–145)
TRIGL SERPL-MCNC: 74 MG/DL (ref 37–140)
TSH SERPL-ACNC: 0.98 UIU/ML (ref 0.35–4.94)
VLDLC SERPL CALC-MCNC: 15 MG/DL
WBC # BLD AUTO: 12.04 X10(3)/MCL (ref 4.5–11.5)

## 2024-09-26 PROCEDURE — 85025 COMPLETE CBC W/AUTO DIFF WBC: CPT

## 2024-09-26 PROCEDURE — 36415 COLL VENOUS BLD VENIPUNCTURE: CPT

## 2024-09-26 PROCEDURE — 84443 ASSAY THYROID STIM HORMONE: CPT

## 2024-09-26 PROCEDURE — 80053 COMPREHEN METABOLIC PANEL: CPT

## 2024-09-26 PROCEDURE — 80061 LIPID PANEL: CPT

## 2024-09-26 PROCEDURE — 83036 HEMOGLOBIN GLYCOSYLATED A1C: CPT

## 2024-09-26 PROCEDURE — 82306 VITAMIN D 25 HYDROXY: CPT

## 2025-04-04 ENCOUNTER — HOSPITAL ENCOUNTER (INPATIENT)
Facility: HOSPITAL | Age: 30
LOS: 3 days | Discharge: HOME | DRG: 071 | End: 2025-04-07
Attending: INTERNAL MEDICINE | Admitting: INTERNAL MEDICINE
Payer: COMMERCIAL

## 2025-04-04 DIAGNOSIS — I63.9 ACUTE CVA (CEREBROVASCULAR ACCIDENT): ICD-10-CM

## 2025-04-04 DIAGNOSIS — R41.82 ALTERED MENTAL STATUS, UNSPECIFIED ALTERED MENTAL STATUS TYPE: ICD-10-CM

## 2025-04-04 DIAGNOSIS — D72.829 LEUKOCYTOSIS, UNSPECIFIED TYPE: ICD-10-CM

## 2025-04-04 DIAGNOSIS — R41.82 AMS (ALTERED MENTAL STATUS): Primary | ICD-10-CM

## 2025-04-04 LAB
ACCEPTIBLE SP GR UR QL: 1.01 (ref 1–1.03)
ALBUMIN SERPL-MCNC: 4 G/DL (ref 3.5–5)
ALBUMIN/GLOB SERPL: 1 RATIO (ref 1.1–2)
ALP SERPL-CCNC: 64 UNIT/L (ref 40–150)
ALT SERPL-CCNC: 14 UNIT/L (ref 0–55)
AMMONIA PLAS-MSCNC: 21.3 UMOL/L (ref 18–72)
AMPHET UR QL SCN: NEGATIVE
ANION GAP SERPL CALC-SCNC: 15 MEQ/L
APTT PPP: 27.1 SECONDS (ref 23.2–33.7)
AST SERPL-CCNC: 21 UNIT/L (ref 11–45)
AV INDEX (PROSTH): 0.74
AV MEAN GRADIENT: 9 MMHG
AV PEAK GRADIENT: 14 MMHG
AV VALVE AREA BY VELOCITY RATIO: 1.9 CM²
AV VALVE AREA: 1.9 CM²
AV VELOCITY RATIO: 0.74
B-HCG FREE SERPL-ACNC: <2.42 MIU/ML
B-HCG UR QL: NEGATIVE
BACTERIA #/AREA URNS AUTO: ABNORMAL /HPF
BARBITURATE SCN PRESENT UR: NEGATIVE
BASOPHILS # BLD AUTO: 0.06 X10(3)/MCL
BASOPHILS NFR BLD AUTO: 0.3 %
BENZODIAZ UR QL SCN: NEGATIVE
BILIRUB SERPL-MCNC: 0.5 MG/DL
BILIRUB UR QL STRIP.AUTO: NEGATIVE
BSA FOR ECHO PROCEDURE: 1.87 M2
BUN SERPL-MCNC: 9.5 MG/DL (ref 7–18.7)
CALCIUM SERPL-MCNC: 9.5 MG/DL (ref 8.4–10.2)
CANNABINOIDS UR QL SCN: NEGATIVE
CHLORIDE SERPL-SCNC: 107 MMOL/L (ref 98–107)
CHOLEST SERPL-MCNC: 133 MG/DL
CHOLEST/HDLC SERPL: 2 {RATIO} (ref 0–5)
CLARITY UR: CLEAR
CO2 SERPL-SCNC: 14 MMOL/L (ref 22–29)
COCAINE UR QL SCN: NEGATIVE
COLOR UR AUTO: COLORLESS
CREAT SERPL-MCNC: 0.78 MG/DL (ref 0.55–1.02)
CREAT/UREA NIT SERPL: 12
CRP SERPL-MCNC: 20.9 MG/L
CV ECHO LV RWT: 0.39 CM
D DIMER PPP IA.FEU-MCNC: 0.65 UG/ML FEU (ref 0–0.5)
DOP CALC AO PEAK VEL: 1.9 M/S
DOP CALC AO VTI: 30.8 CM
DOP CALC LVOT AREA: 2.5 CM2
DOP CALC LVOT DIAMETER: 1.8 CM
DOP CALC LVOT PEAK VEL: 1.4 M/S
DOP CALC LVOT STROKE VOLUME: 57.7 CM3
DOP CALC MV VTI: 14.9 CM
DOP CALCLVOT PEAK VEL VTI: 22.7 CM
E/A RATIO: 1.13
ECHO LV POSTERIOR WALL: 0.9 CM (ref 0.6–1.1)
EOSINOPHIL # BLD AUTO: 0 X10(3)/MCL (ref 0–0.9)
EOSINOPHIL NFR BLD AUTO: 0 %
ERYTHROCYTE [DISTWIDTH] IN BLOOD BY AUTOMATED COUNT: 13.4 % (ref 11.5–17)
ETHANOL SERPL-MCNC: <10 MG/DL
FENTANYL UR QL SCN: NEGATIVE
FERRITIN SERPL-MCNC: 26.6 NG/ML (ref 4.63–204)
FLUAV AG UPPER RESP QL IA.RAPID: NOT DETECTED
FLUBV AG UPPER RESP QL IA.RAPID: NOT DETECTED
FOLATE SERPL-MCNC: 9.8 NG/ML (ref 7–31.4)
FRACTIONAL SHORTENING: 32.6 % (ref 28–44)
GFR SERPLBLD CREATININE-BSD FMLA CKD-EPI: >60 ML/MIN/1.73/M2
GLOBULIN SER-MCNC: 3.9 GM/DL (ref 2.4–3.5)
GLUCOSE SERPL-MCNC: 110 MG/DL (ref 74–100)
GLUCOSE UR QL STRIP: NORMAL
HCT VFR BLD AUTO: 36.4 % (ref 37–47)
HDLC SERPL-MCNC: 57 MG/DL (ref 35–60)
HGB BLD-MCNC: 11.5 G/DL (ref 12–16)
HGB UR QL STRIP: NEGATIVE
HIV 1+2 AB+HIV1 P24 AG SERPL QL IA: NONREACTIVE
IMM GRANULOCYTES # BLD AUTO: 0.1 X10(3)/MCL (ref 0–0.04)
IMM GRANULOCYTES NFR BLD AUTO: 0.5 %
INR PPP: 1
INTERVENTRICULAR SEPTUM: 0.8 CM (ref 0.6–1.1)
IRON SATN MFR SERPL: 12 % (ref 20–50)
IRON SERPL-MCNC: 44 UG/DL (ref 50–170)
KETONES UR QL STRIP: ABNORMAL
LACTATE SERPL-SCNC: 2.2 MMOL/L (ref 0.5–2.2)
LACTATE SERPL-SCNC: 3.4 MMOL/L (ref 0.5–2.2)
LDLC SERPL CALC-MCNC: 69 MG/DL (ref 50–140)
LEFT INTERNAL DIMENSION IN SYSTOLE: 3.1 CM (ref 2.1–4)
LEFT VENTRICLE DIASTOLIC VOLUME INDEX: 53.3 ML/M2
LEFT VENTRICLE DIASTOLIC VOLUME: 97 ML
LEFT VENTRICLE MASS INDEX: 70.1 G/M2
LEFT VENTRICLE SYSTOLIC VOLUME INDEX: 20.9 ML/M2
LEFT VENTRICLE SYSTOLIC VOLUME: 38 ML
LEFT VENTRICULAR INTERNAL DIMENSION IN DIASTOLE: 4.6 CM (ref 3.5–6)
LEFT VENTRICULAR MASS: 127.7 G
LEUKOCYTE ESTERASE UR QL STRIP: NEGATIVE
LIPASE SERPL-CCNC: 15 U/L
LVED V (TEICH): 97.3 ML
LVES V (TEICH): 37.9 ML
LVOT MG: 4 MMHG
LVOT MV: 0.93 CM/S
LYMPHOCYTES # BLD AUTO: 1.82 X10(3)/MCL (ref 0.6–4.6)
LYMPHOCYTES NFR BLD AUTO: 9.2 %
MAGNESIUM SERPL-MCNC: 1.5 MG/DL (ref 1.6–2.6)
MCH RBC QN AUTO: 26 PG (ref 27–31)
MCHC RBC AUTO-ENTMCNC: 31.6 G/DL (ref 33–36)
MCV RBC AUTO: 82.4 FL (ref 80–94)
MDMA UR QL SCN: NEGATIVE
MONOCYTES # BLD AUTO: 0.95 X10(3)/MCL (ref 0.1–1.3)
MONOCYTES NFR BLD AUTO: 4.8 %
MUCOUS THREADS URNS QL MICRO: ABNORMAL /LPF
MV MEAN GRADIENT: 2 MMHG
MV PEAK A VEL: 0.87 M/S
MV PEAK E VEL: 0.98 M/S
MV PEAK GRADIENT: 3 MMHG
MV VALVE AREA BY CONTINUITY EQUATION: 3.87 CM2
NEUTROPHILS # BLD AUTO: 16.95 X10(3)/MCL (ref 2.1–9.2)
NEUTROPHILS NFR BLD AUTO: 85.2 %
NITRITE UR QL STRIP: NEGATIVE
NRBC BLD AUTO-RTO: 0 %
OHS CV RV/LV RATIO: 0.59 CM
OPIATES UR QL SCN: NEGATIVE
PCP UR QL: NEGATIVE
PH UR STRIP: 8 [PH]
PH UR: 8 [PH] (ref 3–11)
PISA TR MAX VEL: 2.5 M/S
PLATELET # BLD AUTO: 335 X10(3)/MCL (ref 130–400)
PMV BLD AUTO: 10.5 FL (ref 7.4–10.4)
POTASSIUM SERPL-SCNC: 3.9 MMOL/L (ref 3.5–5.1)
PROT SERPL-MCNC: 7.9 GM/DL (ref 6.4–8.3)
PROT UR QL STRIP: NEGATIVE
PROTHROMBIN TIME: 12.9 SECONDS (ref 12.5–14.5)
RBC # BLD AUTO: 4.42 X10(6)/MCL (ref 4.2–5.4)
RBC #/AREA URNS AUTO: ABNORMAL /HPF
RIGHT VENTRICLE DIASTOLIC BASEL DIMENSION: 2.7 CM
RIGHT VENTRICULAR END-DIASTOLIC DIMENSION: 2.7 CM
RSV A 5' UTR RNA NPH QL NAA+PROBE: NOT DETECTED
SARS-COV-2 RNA RESP QL NAA+PROBE: NOT DETECTED
SODIUM SERPL-SCNC: 136 MMOL/L (ref 136–145)
SP GR UR STRIP.AUTO: 1.01 (ref 1–1.03)
SQUAMOUS #/AREA URNS LPF: ABNORMAL /HPF
T PALLIDUM AB SER QL: NONREACTIVE
TIBC SERPL-MCNC: 323 UG/DL (ref 70–310)
TIBC SERPL-MCNC: 367 UG/DL (ref 250–450)
TR MAX PG: 25 MMHG
TRANSFERRIN SERPL-MCNC: 325 MG/DL (ref 180–382)
TRICUSPID ANNULAR PLANE SYSTOLIC EXCURSION: 2.26 CM
TRIGL SERPL-MCNC: 33 MG/DL (ref 37–140)
TROPONIN I SERPL-MCNC: <0.01 NG/ML (ref 0–0.04)
TSH SERPL-ACNC: 0.71 UIU/ML (ref 0.35–4.94)
UROBILINOGEN UR STRIP-ACNC: NORMAL
VIT B12 SERPL-MCNC: 821 PG/ML (ref 213–816)
VLDLC SERPL CALC-MCNC: 7 MG/DL
WBC # BLD AUTO: 19.88 X10(3)/MCL (ref 4.5–11.5)
WBC #/AREA URNS AUTO: ABNORMAL /HPF
Z-SCORE OF LEFT VENTRICULAR DIMENSION IN END DIASTOLE: -0.91
Z-SCORE OF LEFT VENTRICULAR DIMENSION IN END SYSTOLE: -0.03

## 2025-04-04 PROCEDURE — 82728 ASSAY OF FERRITIN: CPT | Performed by: INTERNAL MEDICINE

## 2025-04-04 PROCEDURE — 25500020 PHARM REV CODE 255: Performed by: INTERNAL MEDICINE

## 2025-04-04 PROCEDURE — 85730 THROMBOPLASTIN TIME PARTIAL: CPT | Performed by: INTERNAL MEDICINE

## 2025-04-04 PROCEDURE — 86140 C-REACTIVE PROTEIN: CPT | Performed by: INTERNAL MEDICINE

## 2025-04-04 PROCEDURE — 63600175 PHARM REV CODE 636 W HCPCS: Performed by: INTERNAL MEDICINE

## 2025-04-04 PROCEDURE — 25000003 PHARM REV CODE 250: Performed by: INTERNAL MEDICINE

## 2025-04-04 PROCEDURE — 84484 ASSAY OF TROPONIN QUANT: CPT | Performed by: INTERNAL MEDICINE

## 2025-04-04 PROCEDURE — 83735 ASSAY OF MAGNESIUM: CPT | Performed by: INTERNAL MEDICINE

## 2025-04-04 PROCEDURE — 85379 FIBRIN DEGRADATION QUANT: CPT | Performed by: INTERNAL MEDICINE

## 2025-04-04 PROCEDURE — 83540 ASSAY OF IRON: CPT | Performed by: INTERNAL MEDICINE

## 2025-04-04 PROCEDURE — 81025 URINE PREGNANCY TEST: CPT | Performed by: INTERNAL MEDICINE

## 2025-04-04 PROCEDURE — 82607 VITAMIN B-12: CPT | Performed by: INTERNAL MEDICINE

## 2025-04-04 PROCEDURE — 83690 ASSAY OF LIPASE: CPT | Performed by: INTERNAL MEDICINE

## 2025-04-04 PROCEDURE — 81001 URINALYSIS AUTO W/SCOPE: CPT | Mod: XB | Performed by: INTERNAL MEDICINE

## 2025-04-04 PROCEDURE — 96361 HYDRATE IV INFUSION ADD-ON: CPT

## 2025-04-04 PROCEDURE — 99285 EMERGENCY DEPT VISIT HI MDM: CPT | Mod: 25

## 2025-04-04 PROCEDURE — 80061 LIPID PANEL: CPT | Performed by: NURSE PRACTITIONER

## 2025-04-04 PROCEDURE — 82746 ASSAY OF FOLIC ACID SERUM: CPT | Performed by: INTERNAL MEDICINE

## 2025-04-04 PROCEDURE — 80053 COMPREHEN METABOLIC PANEL: CPT | Performed by: INTERNAL MEDICINE

## 2025-04-04 PROCEDURE — 85025 COMPLETE CBC W/AUTO DIFF WBC: CPT | Performed by: INTERNAL MEDICINE

## 2025-04-04 PROCEDURE — 11000001 HC ACUTE MED/SURG PRIVATE ROOM

## 2025-04-04 PROCEDURE — 87040 BLOOD CULTURE FOR BACTERIA: CPT | Performed by: INTERNAL MEDICINE

## 2025-04-04 PROCEDURE — 87389 HIV-1 AG W/HIV-1&-2 AB AG IA: CPT | Performed by: INTERNAL MEDICINE

## 2025-04-04 PROCEDURE — 80307 DRUG TEST PRSMV CHEM ANLYZR: CPT | Performed by: INTERNAL MEDICINE

## 2025-04-04 PROCEDURE — 21400001 HC TELEMETRY ROOM

## 2025-04-04 PROCEDURE — 96374 THER/PROPH/DIAG INJ IV PUSH: CPT

## 2025-04-04 PROCEDURE — 84425 ASSAY OF VITAMIN B-1: CPT | Performed by: INTERNAL MEDICINE

## 2025-04-04 PROCEDURE — 84443 ASSAY THYROID STIM HORMONE: CPT | Performed by: INTERNAL MEDICINE

## 2025-04-04 PROCEDURE — 83605 ASSAY OF LACTIC ACID: CPT | Performed by: INTERNAL MEDICINE

## 2025-04-04 PROCEDURE — 82140 ASSAY OF AMMONIA: CPT | Performed by: INTERNAL MEDICINE

## 2025-04-04 PROCEDURE — 0241U COVID/RSV/FLU A&B PCR: CPT | Performed by: INTERNAL MEDICINE

## 2025-04-04 PROCEDURE — 82077 ASSAY SPEC XCP UR&BREATH IA: CPT | Performed by: INTERNAL MEDICINE

## 2025-04-04 PROCEDURE — 84702 CHORIONIC GONADOTROPIN TEST: CPT | Performed by: INTERNAL MEDICINE

## 2025-04-04 PROCEDURE — 86780 TREPONEMA PALLIDUM: CPT | Performed by: INTERNAL MEDICINE

## 2025-04-04 PROCEDURE — 85610 PROTHROMBIN TIME: CPT | Performed by: INTERNAL MEDICINE

## 2025-04-04 RX ORDER — BUTALBITAL, ACETAMINOPHEN AND CAFFEINE 50; 325; 40 MG/1; MG/1; MG/1
1 TABLET ORAL EVERY 8 HOURS PRN
Status: DISCONTINUED | OUTPATIENT
Start: 2025-04-04 | End: 2025-04-07 | Stop reason: HOSPADM

## 2025-04-04 RX ORDER — LABETALOL HYDROCHLORIDE 5 MG/ML
10 INJECTION, SOLUTION INTRAVENOUS EVERY 6 HOURS PRN
Status: DISCONTINUED | OUTPATIENT
Start: 2025-04-04 | End: 2025-04-07 | Stop reason: HOSPADM

## 2025-04-04 RX ORDER — MAGNESIUM SULFATE HEPTAHYDRATE 40 MG/ML
2 INJECTION, SOLUTION INTRAVENOUS ONCE
Status: COMPLETED | OUTPATIENT
Start: 2025-04-04 | End: 2025-04-04

## 2025-04-04 RX ORDER — LORAZEPAM 2 MG/ML
2 INJECTION INTRAMUSCULAR EVERY 4 HOURS PRN
Status: DISCONTINUED | OUTPATIENT
Start: 2025-04-04 | End: 2025-04-07 | Stop reason: HOSPADM

## 2025-04-04 RX ORDER — ACETAMINOPHEN 500 MG
1000 TABLET ORAL EVERY 6 HOURS PRN
Status: DISCONTINUED | OUTPATIENT
Start: 2025-04-04 | End: 2025-04-07 | Stop reason: HOSPADM

## 2025-04-04 RX ORDER — BISACODYL 10 MG/1
10 SUPPOSITORY RECTAL DAILY PRN
Status: DISCONTINUED | OUTPATIENT
Start: 2025-04-04 | End: 2025-04-07 | Stop reason: HOSPADM

## 2025-04-04 RX ORDER — ACETAMINOPHEN 500 MG
1000 TABLET ORAL EVERY 6 HOURS PRN
Status: DISCONTINUED | OUTPATIENT
Start: 2025-04-04 | End: 2025-04-04

## 2025-04-04 RX ORDER — LORAZEPAM 2 MG/ML
1 INJECTION INTRAMUSCULAR
Status: COMPLETED | OUTPATIENT
Start: 2025-04-04 | End: 2025-04-04

## 2025-04-04 RX ORDER — SODIUM CHLORIDE, SODIUM LACTATE, POTASSIUM CHLORIDE, CALCIUM CHLORIDE 600; 310; 30; 20 MG/100ML; MG/100ML; MG/100ML; MG/100ML
INJECTION, SOLUTION INTRAVENOUS CONTINUOUS
Status: ACTIVE | OUTPATIENT
Start: 2025-04-04 | End: 2025-04-05

## 2025-04-04 RX ADMIN — SODIUM CHLORIDE 1000 ML: 9 INJECTION, SOLUTION INTRAVENOUS at 11:04

## 2025-04-04 RX ADMIN — ACETAMINOPHEN 1000 MG: 500 TABLET ORAL at 06:04

## 2025-04-04 RX ADMIN — MAGNESIUM SULFATE HEPTAHYDRATE 2 G: 40 INJECTION, SOLUTION INTRAVENOUS at 04:04

## 2025-04-04 RX ADMIN — SODIUM CHLORIDE, POTASSIUM CHLORIDE, SODIUM LACTATE AND CALCIUM CHLORIDE: 600; 310; 30; 20 INJECTION, SOLUTION INTRAVENOUS at 11:04

## 2025-04-04 RX ADMIN — MAGNESIUM SULFATE HEPTAHYDRATE 2 G: 40 INJECTION, SOLUTION INTRAVENOUS at 08:04

## 2025-04-04 RX ADMIN — LORAZEPAM 1 MG: 2 INJECTION INTRAMUSCULAR; INTRAVENOUS at 12:04

## 2025-04-04 RX ADMIN — IOHEXOL 100 ML: 350 INJECTION, SOLUTION INTRAVENOUS at 12:04

## 2025-04-04 NOTE — H&P
[facility] Hospital Medicine History & Physical Examination       Patient Name: Yesi Porter  MRN: [id number]  Patient Class: IP- Inpatient   Admission Date: 04/04/2025   Admitting Service: Hospital Medicine   Length of Stay: 0  Attending Physician: Ama Esparza MD  Primary Care Provider: Mitzi Dow FNP  Face-to-Face encounter date: 04/04/2025  Code Status: Full  Chief Complaint: Altered Mental Status (Presents via [facility] for AMS. Patient walked into urgent care for feeling bad. When staff checked on patient 15 minutes later, patient was found unresponsive with urinary incontinence. No hx of seizure. GCS 9 upon arrival. )      Patient information was obtained from patient, patient's family, past medical records and ED records.    MD addendum-    Admitted for evaluation of AMS include an episode of unresponsiveness associated with urinary incontinence and inability to speak happened at an urgent care clinic while she was waiting to be seen. No prior similar  episode , no prior h/o seizures. Pt reportedly went to the urgent care clinic earlier for evaluation of headache , nausea and increased generalized weakness started today. Denies sick contact, recent travel. Afebrile on arrival with mild tachycardia, BP stable. Labs significant for leukocytosis, metabolic acidosis, hypomagnesemia , lactic 3.4. imaging study include Ct head /MRI neg for acute findings. EEG, LP ordered. Neurology consulted.        HISTORY OF PRESENT ILLNESS:   29 y.o. female with a PMHx of anxiety/depression who presented to [facility] via EMS on 4/4/2025 with c/o altered mental status that began on the morning of presentation.  Patient initially presented to an outlying urgent care around 9:00 a.m. and about 15 minutes after being placed in around she became altered, nonverbal with urinary incontinence.  EMS reported patient remained nonverbal, but is moving all extremities and maintaining her airway. No history  of seizure disorder.     ED Course: Initial ED Vital Signs included /81, , RR 8, SpO2 100% on room air.  Labs notable for WBC 19.88, hemoglobin 11.5, hematocrit 36.4, D-dimer 0.65, CO2 14, glucose 110, magnesium 1.5.  Troponin undetectable.  Initial lactic acid 3.4 and repeat normal.  TSH within normal range.  Urine hCG negative.  Alcohol level undetectable.  UDS unremarkable.  Influenza, COVID-19 negative.  Urinalysis unremarkable.  CT head without contrast negative for acute intracranial findings.  CXR negative for acute cardiopulmonary process.  CTA chest limited exam with extensive artifact caused by patient motion and suboptimal contrast opacification of the pulmonary arterial system, no gross evidence of main pulmonary arteries and proximal main branch thromboembolism, no acute infiltrates or effusions.  She received IV Ativan in ED.  Admitted to hospital medicine service for further medical management.    REVIEW OF SYSTEMS:   Except as documented, all other systems reviewed and negative.    PAST MEDICAL HISTORY:     Past Medical History:   Diagnosis Date    Depression        PAST SURGICAL HISTORY:     Past Surgical History:   Procedure Laterality Date    ABCESS DRAINAGE       SECTION         FAMILY HISTORY:   Reviewed and negative    SOCIAL HISTORY:   Denied alcohol, tobacco or illicit drug use.     SCREENING FOR SOCIAL DRIVERS FOR HEALTH:   Patient screened for food insecurity, housing instability, transportation needs, utility difficulties, and interpersonal safety (select all that apply as identified as concern):  []Housing or Food  []Transportation Needs  []Utility Difficulties  []Interpersonal safety  [x]None    ALLERGIES:   Patient has no known allergies.    HOME MEDICATIONS:     Prior to Admission medications    Medication Sig Start Date End Date Taking? Authorizing Provider   hydrOXYzine pamoate (VISTARIL) 25 MG Cap Take 1 capsule (25 mg total) by mouth every evening. 24    Mitzi Dow, FNP   metroNIDAZOLE (NUVESSA) 1.3 % (65 mg/5 gram) Gel Place 1 Applicatorful vaginally every evening. 2/25/25   Tino Guzman MD   norelgestromin-ethinyl estradiol 150-35 mcg/24 hr Place 1 patch onto the skin every 7 days. 3/10/25 3/10/26  Tino Guzman MD     ________________________________________________________________________  INPATIENT LIST OF MEDICATIONS   Current Medications[1]    Scheduled Meds:  Continuous Infusions:  PRN Meds:.  Current Facility-Administered Medications:     bisacodyL, 10 mg, Rectal, Daily PRN    labetalol, 10 mg, Intravenous, Q6H PRN    PHYSICAL EXAM:     VITAL SIGNS: 24 HRS MIN & MAX LAST   No data recorded  (UTO, sent straight to room 34.)   BP  Min: 120/81  Max: 128/56 (!) 128/56   Pulse  Min: 86  Max: 124  104   Resp  Min: 8  Max: 14 12   SpO2  Min: 100 %  Max: 100 % 100 %       Physical Exam per attending MD.     LABS AND IMAGING:     Recent Labs   Lab 04/04/25  1052   WBC 19.88*   RBC 4.42   HGB 11.5*   HCT 36.4*   MCV 82.4   MCH 26.0*   MCHC 31.6*   RDW 13.4      MPV 10.5*       Recent Labs   Lab 04/04/25  1052      K 3.9      CO2 14*   BUN 9.5   CREATININE 0.78   CALCIUM 9.5   MG 1.50*   ALBUMIN 4.0   ALKPHOS 64   ALT 14   AST 21   BILITOT 0.5       Microbiology Results (last 7 days)       Procedure Component Value Units Date/Time    Blood culture #1 **CANNOT BE ORDERED STAT** [[id number]] Collected: 04/04/25 1122    Order Status: Resulted Specimen: Blood Updated: 04/04/25 1308    Blood culture #2 **CANNOT BE ORDERED STAT** [[id number]] Collected: 04/04/25 1123    Order Status: Resulted Specimen: Blood Updated: 04/04/25 1141             CTA Chest Non-Coronary (PE Studies)  Narrative: EXAMINATION:  CTA CHEST NON CORONARY (PE STUDIES)    CLINICAL HISTORY:  Pulmonary embolism (PE) suspected, positive D-dimer;    TECHNIQUE:  Sequential axial images performed of the chest using pulmonary embolism protocol following intravenous contrast  bolus. Sagittal and contrast reformations performed.  MIP images are also performed.    Dose product length of 218 mGycm. Automated exposure control was utilized to minimize radiation dose.    COMPARISON:  Chest radiograph April 4, 2025    FINDINGS:  Suboptimal exam with upper thoracic images degraded by artifacts caused by the patient motion.  There is also suboptimal contrast bolus timing with limited contrast opacification of the pulmonary arterial system.  No gross evidence of main pulmonary arteries or proximal pulmonary arteries branches filling defects to suggest pulmonary thromboembolism.  Aortic arch assessment is nondiagnostic.  Otherwise, the thoracic aorta is without aneurysmal dilatation or dissection.  Cardiac chamber size is within normal limits.  No pericardial effusion.    Limited assessment of the upper lungs.  Otherwise, as visualized no pulmonary edema or consolidation.  No fluid within the pleural and the pericardial spaces.  Impression: 1.  Limited exam with extensive artifacts caused by patient motion and suboptimal contrast opacification of the pulmonary arterial system.  No gross evidence of main pulmonary arteries and proximal main branches thromboembolism.    2.  No acute infiltrates or effusions.    Electronically signed by: Magdy Branch  Date:    04/04/2025  Time:    13:02  CT Head Without Contrast  Narrative: EXAMINATION:  CT HEAD WITHOUT CONTRAST    CLINICAL HISTORY:  Mental status change, unknown cause;    TECHNIQUE:  CT imaging of the head performed from the skull base to the vertex without intravenous contrast.  DLP 2078 mGycm. Automatic exposure control, adjustment of mA/kV or iterative reconstruction technique was used to reduce radiation.    COMPARISON:  None Available.    FINDINGS:  There is no acute cortical infarct, hemorrhage or mass lesion.  The ventricles are normal in size.    No significant paranasal sinus inflammation.  Mastoid air cells are clear.  Impression: No acute  intracranial findings.    Electronically signed by: Ernesto Rodriguez  Date:    04/04/2025  Time:    12:20  X-Ray Chest AP Portable  Narrative: EXAMINATION:  XR CHEST AP PORTABLE    CLINICAL HISTORY:  AMS of unknown etiology;    TECHNIQUE:  One view    COMPARISON:  January 14, 2024.    FINDINGS:  Cardiopericardial silhouette is within normal limits.  No acute dense focal or segmental consolidation, congestive process, pleural effusions or pneumothorax.  Impression: No acute cardiopulmonary process identified.    Electronically signed by: Magdy Branch  Date:    04/04/2025  Time:    11:20        ASSESSMENT:   Seizure-like activity  CVA rule out   Acute metabolic encephalopathy   Leukocytosis   Metabolic acidosis   Hypomagnesemia    History:  Anxiety/depression      PLAN:   Seizure precautions   P.r.n. Ativan for breakthrough seizure activity   Fall/seizure/aspiration precautions  Neurology consulted, follow up with recommendations  MRI brain, B Carotid US and ECHO pending  Lipid panel, hemoglobin A1c  pending  Hold antihypertensives to allow for permissive hypertension  PRN labetalol as needed for SBP greater than 220 or DBP greater than 100  PT/OT/SLP to evaluate and treat when appropriate  Neuro checks every 4 hours  IR consulted for LP  Replete magnesium  Resume home medications as deemed appropriate once medication reconciliation is updated  Labs in AM    VTE Prophylaxis: SCDs    Discharge Planning and Disposition: TBD    I, Sherice Bosch NP have reviewed and discussed the case with Ama Esparza MD  Please see the attending MD's addendum for further assessment and plan.    AHSAN Jade-BC  Department of Hospital Medicine   [facility]   04/04/2025    This note was created with the assistance of [employer] Voice Recognition Software. There may be transcription errors as a result of using this technology however minimal, and effort has been made to assure accuracy of  transcription, but any obvious errors or omissions should be clarified with the author of the document.    _______________________________________________________________________________  MD Addendum:  I, Dr. Ama Esparza MD, assumed care of this patient today   For the patient encounter, I performed the substantive portion of the visit, I reviewed the NP/PA documentation, treatment plan, and medical decision making.  I had face to face time with this patient         All diagnosis and differential diagnosis have been reviewed; assessment and plan has been documented; I have personally reviewed the labs and test results that are presently available; I have reviewed the patients medication list; I have reviewed the consulting providers response and recommendations. I have reviewed or attempted to review medical records based upon their availability.    All of the patient and family questions have been addressed and answered. Patient's is agreeable to the above stated plan. I will continue to monitor closely and make adjustments to medical management as needed.      04/04/2025            [1]   Current Facility-Administered Medications:     bisacodyL suppository 10 mg, 10 mg, Rectal, Daily PRN, Sherice Bosch AGACNP-BC    labetaloL injection 10 mg, 10 mg, Intravenous, Q6H PRN, Sherice Bosch AGACNP-BC    Current Outpatient Medications:     hydrOXYzine pamoate (VISTARIL) 25 MG Cap, Take 1 capsule (25 mg total) by mouth every evening., Disp: 30 capsule, Rfl: 6    metroNIDAZOLE (NUVESSA) 1.3 % (65 mg/5 gram) Gel, Place 1 Applicatorful vaginally every evening., Disp: 5 g, Rfl: 0    norelgestromin-ethinyl estradiol 150-35 mcg/24 hr, Place 1 patch onto the skin every 7 days., Disp: 4 patch, Rfl: 11

## 2025-04-04 NOTE — ED PROVIDER NOTES
"Encounter Date: 2025    SCRIBE #1 NOTE: I, Kira Rivero, am scribing for, and in the presence of,  Corwin Ford DO. I have scribed the entire note.       History     Chief Complaint   Patient presents with    Altered Mental Status     Presents via [facility] for AMS. Patient walked into urgent care for feeling bad. When staff checked on patient 15 minutes later, patient was found unresponsive with urinary incontinence. No hx of seizure. GCS 9 upon arrival.      29 year old female presents to the ED via EMS for altered mental status. EMS reports that the pt was seen at urgent care around 0915 this morning. Pt was put into a room and was seen after about 15 minutes. Pt became nonverbal during this time and was given a GCS 9 by EMS. Pt had some urinary incontinence per urgent care. Pt was seen at Urgent care because she "wasn't feeling well." EMS reports the pt is still nonverbal at this time, but is moving all extremities and maintaining her own airway. Pt has no medical hx. Pt is on birth control. Pt had a CBG of 124 en route. Pt's ROS is unobtainable due to being nonverbal.    The history is provided by the EMS personnel. No  was used.     Review of patient's allergies indicates:  No Known Allergies  Past Medical History:   Diagnosis Date    Depression      Past Surgical History:   Procedure Laterality Date    ABCESS DRAINAGE       SECTION       Family History   Problem Relation Name Age of Onset    Atrial fibrillation Mother      Arrhythmia Mother      Liver cancer Maternal Grandfather      Lung cancer Paternal Grandmother       Social History[1]  Review of Systems   Unable to perform ROS: Mental status change       Physical Exam     Initial Vitals [25 1041]   BP Pulse Resp Temp SpO2   120/81 (!) 124 (!) 8 -- 100 %      MAP       --         Physical Exam    Constitutional: She appears well-developed and well-nourished.   Nonverbal. Not following commands.    HENT:   Head: " Normocephalic and atraumatic. Mouth/Throat: Uvula is midline, oropharynx is clear and moist and mucous membranes are normal.   Eyes: Conjunctivae and EOM are normal. Pupils are equal, round, and reactive to light.   Spontaneously opening eyes. Pupils 2 mm bilaterally equal, round, and reactive.    Neck: Trachea normal and phonation normal. Neck supple.    Full passive range of motion without pain.     Cardiovascular:  Regular rhythm, normal heart sounds, intact distal pulses and normal pulses.   Tachycardia present.         No murmur heard.  Pulmonary/Chest: No accessory muscle usage. No tachypnea. No respiratory distress. She has no decreased breath sounds. She has no wheezes. She has no rhonchi. She has no rales.   Abdominal: Abdomen is soft. Bowel sounds are normal. She exhibits no distension. There is no abdominal tenderness. No hernia. There is no rebound and no guarding.   Musculoskeletal:      Cervical back: Full passive range of motion without pain and neck supple.     Neurological: GCS eye subscore is 4. GCS verbal subscore is 1. GCS motor subscore is 5.   Spontaneously moving all 4 extremities.    Skin: Skin is warm, dry and intact. Capillary refill takes less than 2 seconds. No rash noted.   Psychiatric: She has a normal mood and affect. Her speech is normal and behavior is normal. Thought content normal.         ED Course   Critical Care    Date/Time: 4/4/2025 4:34 PM    Performed by: Corwin Ford DO  Authorized by: Ama Esparza MD  Direct patient critical care time: 15 minutes  Additional history critical care time: 5 minutes  Ordering / reviewing critical care time: 5 minutes  Documentation critical care time: 5 minutes  Consulting other physicians critical care time: 5 minutes  Consult with family critical care time: 5 minutes  Total critical care time (exclusive of procedural time) : 40 minutes  Critical care time was exclusive of separately billable procedures and treating other  patients.  Critical care was necessary to treat or prevent imminent or life-threatening deterioration of the following conditions: CNS failure or compromise and dehydration.  Critical care was time spent personally by me on the following activities: development of treatment plan with patient or surrogate, discussions with consultants, examination of patient, obtaining history from patient or surrogate, ordering and performing treatments and interventions, ordering and review of laboratory studies, ordering and review of radiographic studies, pulse oximetry, re-evaluation of patient's condition and review of old charts.        Labs Reviewed   COMPREHENSIVE METABOLIC PANEL - Abnormal       Result Value    Sodium 136      Potassium 3.9      Chloride 107      CO2 14 (*)     Glucose 110 (*)     Blood Urea Nitrogen 9.5      Creatinine 0.78      Calcium 9.5      Protein Total 7.9      Albumin 4.0      Globulin 3.9 (*)     Albumin/Globulin Ratio 1.0 (*)     Bilirubin Total 0.5      ALP 64      ALT 14      AST 21      eGFR >60      Anion Gap 15.0      BUN/Creatinine Ratio 12     D DIMER, QUANTITATIVE - Abnormal    D-Dimer 0.65 (*)    LACTIC ACID, PLASMA - Abnormal    Lactic Acid Level 3.4 (*)    MAGNESIUM - Abnormal    Magnesium Level 1.50 (*)    URINALYSIS, REFLEX TO URINE CULTURE - Abnormal    Color, UA Colorless      Appearance, UA Clear      Specific Gravity, UA 1.012      pH, UA 8.0      Protein, UA Negative      Glucose, UA Normal      Ketones, UA Trace (*)     Blood, UA Negative      Bilirubin, UA Negative      Urobilinogen, UA Normal      Nitrites, UA Negative      Leukocyte Esterase, UA Negative      RBC, UA None Seen      WBC, UA 0-5      Bacteria, UA None Seen      Squamous Epithelial Cells, UA Trace      Mucous, UA Trace (*)    CBC WITH DIFFERENTIAL - Abnormal    WBC 19.88 (*)     RBC 4.42      Hgb 11.5 (*)     Hct 36.4 (*)     MCV 82.4      MCH 26.0 (*)     MCHC 31.6 (*)     RDW 13.4      Platelet 335      MPV  10.5 (*)     Neut % 85.2      Lymph % 9.2      Mono % 4.8      Eos % 0.0      Basophil % 0.3      Imm Grans % 0.5      Neut # 16.95 (*)     Lymph # 1.82      Mono # 0.95      Eos # 0.00      Baso # 0.06      Imm Gran # 0.10 (*)     NRBC% 0.0     LIPID PANEL - Abnormal    Cholesterol Total 133      HDL Cholesterol 57      Triglyceride 33 (*)     Cholesterol/HDL Ratio 2      Very Low Density Lipoprotein 7      LDL Cholesterol 69.00     C-REACTIVE PROTEIN - Abnormal    CRP 20.90 (*)    VITAMIN B12 - Abnormal    Vitamin B12 821 (*)    IRON AND TIBC - Abnormal    Iron Binding Capacity Unsaturated 323 (*)     Iron Level 44 (*)     Transferrin 325      Iron Binding Capacity Total 367      Iron Saturation 12 (*)    COVID/RSV/FLU A&B PCR - Normal    Influenza A PCR Not Detected      Influenza B PCR Not Detected      Respiratory Syncytial Virus PCR Not Detected      SARS-CoV-2 PCR Not Detected      Narrative:     The Xpert Xpress SARS-CoV-2/FLU/RSV plus is a rapid, multiplexed real-time PCR test intended for the simultaneous qualitative detection and differentiation of SARS-CoV-2, Influenza A, Influenza B, and respiratory syncytial virus (RSV) viral RNA in either nasopharyngeal swab or nasal swab specimens.         DRUG SCREEN, URINE (BEAKER) - Normal    Amphetamines, Urine Negative      Barbiturates, Urine Negative      Benzodiazepine, Urine Negative      Cannabinoids, Urine Negative      Cocaine, Urine Negative      Fentanyl, Urine Negative      MDMA, Urine Negative      Opiates, Urine Negative      Phencyclidine, Urine Negative      pH, Urine 8.0      Specific Gravity, Urine Auto 1.012      Narrative:     Cut off concentrations:    Amphetamines - 1000 ng/ml  Barbiturates - 200 ng/ml  Benzodiazepine - 200 ng/ml  Cannabinoids (THC) - 50 ng/ml  Cocaine - 300 ng/ml  Fentanyl - 1.0 ng/ml  MDMA - 500 ng/ml  Opiates - 300 ng/ml   Phencyclidine (PCP) - 25 ng/ml    Specimen submitted for drug analysis and tested for pH and specific  gravity in order to evaluate sample integrity. Suspect tampering if specific gravity is <1.003 and/or pH is not within the range of 4.5 - 8.0  False negatives may result form substances such as bleach added to urine.  False positives may result for the presence of a substance with similar chemical structure to the drug or its metabolite.    This test provides only a PRELIMINARY analytical test result. A more specific alternate chemical method must be used in order to obtain a confirmed analytical result. Gas chromatography/mass spectrometry (GC/MS) is the preferred confirmatory method. Other chemical confirmation methods are available. Clinical consideration and professional judgement should be applied to any drug of abuse test result, particularly when preliminary positive results are used.    Positive results will be confirmed only at the physicians request. Unconfirmed screening results are to be used only for medical purposes (treatment).        ALCOHOL,MEDICAL (ETHANOL) - Normal    Ethanol Level <10.0     LIPASE - Normal    Lipase Level 15     HCG, QUANTITATIVE - Normal    Beta HCG Quant <2.42     PROTIME-INR - Normal    PT 12.9      INR 1.0      Narrative:     Protimes are used to monitor anticoagulant agents such as warfarin. PT INR values are based on the current patient normal mean and the JHOAN value for the specific instrument reagent used.  **Routine theraputic target values for the INR are 2.0-3.0**   APTT - Normal    PTT 27.1     TROPONIN I - Normal    Troponin-I <0.010     TSH - Normal    TSH 0.711     PREGNANCY TEST, URINE RAPID - Normal    hCG Qualitative, Urine Negative     LACTIC ACID, PLASMA - Normal    Lactic Acid Level 2.2     FOLATE - Normal    Folate Level 9.8     SYPHILIS ANTIBODY (WITH REFLEX RPR) - Normal    Syphilis Antibody Nonreactive     FERRITIN - Normal    Ferritin Level 26.60     AMMONIA - Normal    Ammonia Level 21.3     BLOOD CULTURE [facility]   BLOOD CULTURE [facility]   CEREBROSPINAL FLUID  CULTURE [facility]   AFB SMEAR [facility]   MYCOBACTERIA AND NOCARDIA CULTURE   CRYPTOCOCCAL ANTIGEN, CSF   CBC W/ AUTO DIFFERENTIAL    Narrative:     The following orders were created for panel order CBC auto differential.  Procedure                               Abnormality         Status                     ---------                               -----------         ------                     CBC with Differential[[id number]]       Abnormal            Final result                 Please view results for these tests on the individual orders.   CELL COUNT W/ DIFF, CSF    Narrative:     The following orders were created for panel order Cell Count w/ Diff, CSF.  Procedure                               Abnormality         Status                     ---------                               -----------         ------                     Cell Count, CSF[[id number]]                                                              Please view results for these tests on the individual orders.   PROTEIN, CSF   GLUCOSE, CSF   LDH, BODY FLUID (REFERENCE LAB OR NON-[facility])   CELL COUNT, CSF   VDRL, CSF   ENCEPHALOPATHY AUTOIMMUNE EVALUATION, CSF   BETA-2 MICROGLOBULIN, CSF   MENINGITIS/ENCEPHALITIS (ME) PANEL   HERPES SIMPLEX (HSV) BY RAPID PCR, CSF   LYME DISEASE SEROLOGY, CSF   WEST NILE VIRUS, PCR, CSF   HIV 1 / 2 ANTIBODY   VITAMIN B1, WHOLE BLOOD   CYTOLOGY SPECIMEN- MEDICAL CYTOLOGY (FLUID/WASH/BRUSH)          Imaging Results              CTA Chest Non-Coronary (PE Studies) (Final result)  Result time 04/04/25 13:02:43      Final result by Magdy Branch MD (04/04/25 13:02:43)                   Impression:      1.  Limited exam with extensive artifacts caused by patient motion and suboptimal contrast opacification of the pulmonary arterial system.  No gross evidence of main pulmonary arteries and proximal main branches thromboembolism.    2.  No acute infiltrates or effusions.      Electronically signed by: Magdy  Branch  Date:    04/04/2025  Time:    13:02               Narrative:    EXAMINATION:  CTA CHEST NON CORONARY (PE STUDIES)    CLINICAL HISTORY:  Pulmonary embolism (PE) suspected, positive D-dimer;    TECHNIQUE:  Sequential axial images performed of the chest using pulmonary embolism protocol following intravenous contrast bolus. Sagittal and contrast reformations performed.  MIP images are also performed.    Dose product length of 218 mGycm. Automated exposure control was utilized to minimize radiation dose.    COMPARISON:  Chest radiograph April 4, 2025    FINDINGS:  Suboptimal exam with upper thoracic images degraded by artifacts caused by the patient motion.  There is also suboptimal contrast bolus timing with limited contrast opacification of the pulmonary arterial system.  No gross evidence of main pulmonary arteries or proximal pulmonary arteries branches filling defects to suggest pulmonary thromboembolism.  Aortic arch assessment is nondiagnostic.  Otherwise, the thoracic aorta is without aneurysmal dilatation or dissection.  Cardiac chamber size is within normal limits.  No pericardial effusion.    Limited assessment of the upper lungs.  Otherwise, as visualized no pulmonary edema or consolidation.  No fluid within the pleural and the pericardial spaces.                                       CT Head Without Contrast (Final result)  Result time 04/04/25 12:20:22      Final result by Ernesto Rodriguez MD (04/04/25 12:20:22)                   Impression:      No acute intracranial findings.      Electronically signed by: Ernesto Rodriguez  Date:    04/04/2025  Time:    12:20               Narrative:    EXAMINATION:  CT HEAD WITHOUT CONTRAST    CLINICAL HISTORY:  Mental status change, unknown cause;    TECHNIQUE:  CT imaging of the head performed from the skull base to the vertex without intravenous contrast.  DLP 2078 mGycm. Automatic exposure control, adjustment of mA/kV or iterative reconstruction technique was  used to reduce radiation.    COMPARISON:  None Available.    FINDINGS:  There is no acute cortical infarct, hemorrhage or mass lesion.  The ventricles are normal in size.    No significant paranasal sinus inflammation.  Mastoid air cells are clear.                                       X-Ray Chest AP Portable (Final result)  Result time 04/04/25 11:20:16      Final result by Magdy Branch MD (04/04/25 11:20:16)                   Impression:      No acute cardiopulmonary process identified.      Electronically signed by: Magdy Branch  Date:    04/04/2025  Time:    11:20               Narrative:    EXAMINATION:  XR CHEST AP PORTABLE    CLINICAL HISTORY:  AMS of unknown etiology;    TECHNIQUE:  One view    COMPARISON:  January 14, 2024.    FINDINGS:  Cardiopericardial silhouette is within normal limits.  No acute dense focal or segmental consolidation, congestive process, pleural effusions or pneumothorax.                                       Medications   labetaloL injection 10 mg (has no administration in time range)   bisacodyL suppository 10 mg (has no administration in time range)   LORazepam injection 2 mg (has no administration in time range)   magnesium sulfate 2g in water 50mL IVPB (premix) (2 g Intravenous New Bag 4/4/25 1614)     Followed by   magnesium sulfate 2g in water 50mL IVPB (premix) (has no administration in time range)   lactated ringers infusion (has no administration in time range)   sodium chloride 0.9% bolus 1,000 mL 1,000 mL (0 mLs Intravenous Stopped 4/4/25 1520)   LORazepam injection 1 mg (1 mg Intravenous Given 4/4/25 1211)   iohexoL (OMNIPAQUE 350) injection 100 mL (100 mLs Intravenous Given 4/4/25 1244)     Medical Decision Making  35-year-old female presenting with altered mental status    Differential Diagnosis:   Judging by the patient's chief complaint and pertinent history, the patient has the following possible differential diagnoses, including but not limited to the following.   Some of these are deemed to be lower likelihood and some more likely based on my physical exam and history combined with possible lab work and/or imaging studies.   Please see the pertinent studies, and refer to the HPI.  Some of these diagnoses will take further evaluation to fully rule out, perhaps as an outpatient and the patient was encouraged to follow up when discharged for more comprehensive evaluation    Metabolic abnormality, intoxication, toxic ingestion, CVA, infection, structural (SAH, ICH, trauma, neoplastic), seizure/postictal, polypharmacy     Problems Addressed:  AMS (altered mental status): acute illness or injury with systemic symptoms that poses a threat to life or bodily functions  Leukocytosis, unspecified type: undiagnosed new problem with uncertain prognosis    Amount and/or Complexity of Data Reviewed  Independent Historian: parent  External Data Reviewed: labs, radiology, ECG and notes.  Labs: ordered. Decision-making details documented in ED Course.  Radiology: ordered and independent interpretation performed. Decision-making details documented in ED Course.  ECG/medicine tests: ordered and independent interpretation performed. Decision-making details documented in ED Course.    Risk  Prescription drug management.  Decision regarding hospitalization.            Scribe Attestation:   Scribe #1: I performed the above scribed service and the documentation accurately describes the services I performed. I attest to the accuracy of the note.    Attending Attestation:           Physician Attestation for Scribe:  Physician Attestation Statement for Scribe #1: I, Corwin Ford, DO, reviewed documentation, as scribed by Kira Rivero in my presence, and it is both accurate and complete.             ED Course as of 04/04/25 1637   Fri Apr 04, 2025   1041 29-year-old female presented to urgent care with feeling not well.  She was placed in a room and 15 minutes later she was unresponsive with urinary  incontinence.  EMS reports she has had a what appears to be a postictal state since their arrival in his been over an hour.  No history of seizures.  Further history is limited at this time.  Patient does spontaneously open her eyes, not verbal, not following commands but spontaneously moving all extremities without difficulty.  There are no obvious signs of trauma. [MM]   1635 RBC, UA: None Seen [MM]   1635 WBC, UA: 0-5 [MM]   1635 Leukocyte Esterase, UA: Negative [MM]   1635 Influenza A, Molecular: Not Detected [MM]   1635 RSV Ag by Molecular Method: Not Detected [MM]   1635 Influenza B, Molecular: Not Detected [MM]   1635 SARS-CoV2 (COVID-19) Qualitative PCR: Not Detected [MM]   1635 TSH: 0.711 [MM]   1635 Beta HCG Quant: <2.42 [MM]   1635 D-Dimer(!): 0.65 [MM]   1635 Alcohol, Serum: <10.0 [MM]   1635 Magnesium (!): 1.50 [MM]   1635 Sodium: 136 [MM]   1635 Potassium: 3.9 [MM]   1635 Chloride: 107 [MM]   1635 CO2(!): 14 [MM]   1635 Glucose(!): 110 [MM]   1635 BUN: 9.5 [MM]   1635 Creatinine: 0.78 [MM]   1635 Lipase: 15 [MM]   1635 Alcohol, Serum: <10.0 [MM]   1635 PTT: 27.1 [MM]   1635 INR: 1.0 [MM]   1635 WBC(!): 19.88 [MM]   1635 Hemoglobin(!): 11.5 [MM]   1635 Platelet Count: 335  On my independent interpretation, chest x-ray is without obvious pneumonia, pneumothorax, effusions, pneumomediastinum, wide mediastinum, pneumoperitoneum, cardiomegaly, edema.   [MM]   1636 CT of the head is without hemorrhage, mass, mass effect.  CT of the chest is without PE or other acute intrathoracic processes [MM]   1636 Etiology of patient's symptoms is unknown.  She is slightly more awake but still very altered.  Discussed with the patient's mother that I would like to admit due to unknown altered mental status.  They are agreeable with this plan.  Case discussed with hospitalist, who does accept patient for admission [MM]      ED Course User Index  [MM] Corwin Ford,                            Clinical  Impression:  Final diagnoses:  [R41.82] AMS (altered mental status) (Primary)  [D72.829] Leukocytosis, unspecified type          ED Disposition Condition    Admit Stable                    [1]   Social History  Tobacco Use    Smoking status: Never    Smokeless tobacco: Never   Substance Use Topics    Alcohol use: Not Currently     Comment: on occas    Drug use: Never        Corwin Ford DO  04/04/25 [id number]

## 2025-04-05 PROBLEM — R41.82 AMS (ALTERED MENTAL STATUS): Status: ACTIVE | Noted: 2025-04-05

## 2025-04-05 PROBLEM — G93.40 ENCEPHALOPATHY ACUTE: Status: ACTIVE | Noted: 2025-04-05

## 2025-04-05 LAB
ALBUMIN SERPL-MCNC: 3.4 G/DL (ref 3.5–5)
ALBUMIN/GLOB SERPL: 1.1 RATIO (ref 1.1–2)
ALP SERPL-CCNC: 53 UNIT/L (ref 40–150)
ALT SERPL-CCNC: 11 UNIT/L (ref 0–55)
ANION GAP SERPL CALC-SCNC: 11 MEQ/L
APTT PPP: 29.2 SECONDS (ref 23.2–33.7)
AST SERPL-CCNC: 13 UNIT/L (ref 11–45)
B PERT.PT PRMT NPH QL NAA+NON-PROBE: NOT DETECTED
BASOPHILS # BLD AUTO: 0.05 X10(3)/MCL
BASOPHILS NFR BLD AUTO: 0.3 %
BILIRUB SERPL-MCNC: 0.3 MG/DL
BUN SERPL-MCNC: 7.6 MG/DL (ref 7–18.7)
C PNEUM DNA NPH QL NAA+NON-PROBE: NOT DETECTED
CALCIUM SERPL-MCNC: 8.1 MG/DL (ref 8.4–10.2)
CHLORIDE SERPL-SCNC: 109 MMOL/L (ref 98–107)
CK SERPL-CCNC: 120 U/L (ref 29–168)
CO2 SERPL-SCNC: 18 MMOL/L (ref 22–29)
CREAT SERPL-MCNC: 0.67 MG/DL (ref 0.55–1.02)
CREAT/UREA NIT SERPL: 11
CRP SERPL-MCNC: 92.3 MG/L
EOSINOPHIL # BLD AUTO: 0.03 X10(3)/MCL (ref 0–0.9)
EOSINOPHIL NFR BLD AUTO: 0.2 %
ERYTHROCYTE [DISTWIDTH] IN BLOOD BY AUTOMATED COUNT: 13.9 % (ref 11.5–17)
EST. AVERAGE GLUCOSE BLD GHB EST-MCNC: 93.9 MG/DL
GFR SERPLBLD CREATININE-BSD FMLA CKD-EPI: >60 ML/MIN/1.73/M2
GLOBULIN SER-MCNC: 3 GM/DL (ref 2.4–3.5)
GLUCOSE SERPL-MCNC: 99 MG/DL (ref 74–100)
HADV DNA NPH QL NAA+NON-PROBE: NOT DETECTED
HBA1C MFR BLD: 4.9 %
HCOV 229E RNA NPH QL NAA+NON-PROBE: NOT DETECTED
HCOV HKU1 RNA NPH QL NAA+NON-PROBE: NOT DETECTED
HCOV NL63 RNA NPH QL NAA+NON-PROBE: NOT DETECTED
HCOV OC43 RNA NPH QL NAA+NON-PROBE: NOT DETECTED
HCT VFR BLD AUTO: 34.1 % (ref 37–47)
HGB BLD-MCNC: 10.5 G/DL (ref 12–16)
HMPV RNA NPH QL NAA+NON-PROBE: NOT DETECTED
HPIV1 RNA NPH QL NAA+NON-PROBE: NOT DETECTED
HPIV2 RNA NPH QL NAA+NON-PROBE: NOT DETECTED
HPIV3 RNA NPH QL NAA+NON-PROBE: NOT DETECTED
HPIV4 RNA NPH QL NAA+NON-PROBE: NOT DETECTED
IMM GRANULOCYTES # BLD AUTO: 0.09 X10(3)/MCL (ref 0–0.04)
IMM GRANULOCYTES NFR BLD AUTO: 0.5 %
INR PPP: 1.1
LYMPHOCYTES # BLD AUTO: 2.44 X10(3)/MCL (ref 0.6–4.6)
LYMPHOCYTES NFR BLD AUTO: 13.7 %
M PNEUMO DNA NPH QL NAA+NON-PROBE: NOT DETECTED
MAGNESIUM SERPL-MCNC: 2.2 MG/DL (ref 1.6–2.6)
MCH RBC QN AUTO: 26.2 PG (ref 27–31)
MCHC RBC AUTO-ENTMCNC: 30.8 G/DL (ref 33–36)
MCV RBC AUTO: 85 FL (ref 80–94)
MONOCYTES # BLD AUTO: 1.09 X10(3)/MCL (ref 0.1–1.3)
MONOCYTES NFR BLD AUTO: 6.1 %
NEUTROPHILS # BLD AUTO: 14.07 X10(3)/MCL (ref 2.1–9.2)
NEUTROPHILS NFR BLD AUTO: 79.2 %
NRBC BLD AUTO-RTO: 0 %
PHOSPHATE SERPL-MCNC: 3.4 MG/DL (ref 2.3–4.7)
PLATELET # BLD AUTO: 267 X10(3)/MCL (ref 130–400)
PMV BLD AUTO: 10.3 FL (ref 7.4–10.4)
POTASSIUM SERPL-SCNC: 4 MMOL/L (ref 3.5–5.1)
PROCALCITONIN SERPL-MCNC: 0.08 NG/ML
PROT SERPL-MCNC: 6.4 GM/DL (ref 6.4–8.3)
PROTHROMBIN TIME: 14.5 SECONDS (ref 12.5–14.5)
RBC # BLD AUTO: 4.01 X10(6)/MCL (ref 4.2–5.4)
RSV RNA NPH QL NAA+NON-PROBE: NOT DETECTED
RV+EV RNA NPH QL NAA+NON-PROBE: NOT DETECTED
SODIUM SERPL-SCNC: 138 MMOL/L (ref 136–145)
TROPONIN I SERPL-MCNC: <0.01 NG/ML (ref 0–0.04)
WBC # BLD AUTO: 17.77 X10(3)/MCL (ref 4.5–11.5)

## 2025-04-05 PROCEDURE — 84145 PROCALCITONIN (PCT): CPT | Performed by: INTERNAL MEDICINE

## 2025-04-05 PROCEDURE — 86140 C-REACTIVE PROTEIN: CPT | Performed by: INTERNAL MEDICINE

## 2025-04-05 PROCEDURE — 85025 COMPLETE CBC W/AUTO DIFF WBC: CPT | Performed by: NURSE PRACTITIONER

## 2025-04-05 PROCEDURE — 80053 COMPREHEN METABOLIC PANEL: CPT | Performed by: NURSE PRACTITIONER

## 2025-04-05 PROCEDURE — 82550 ASSAY OF CK (CPK): CPT | Performed by: INTERNAL MEDICINE

## 2025-04-05 PROCEDURE — 95816 EEG AWAKE AND DROWSY: CPT

## 2025-04-05 PROCEDURE — 63600175 PHARM REV CODE 636 W HCPCS: Performed by: INTERNAL MEDICINE

## 2025-04-05 PROCEDURE — 21400001 HC TELEMETRY ROOM

## 2025-04-05 PROCEDURE — 85610 PROTHROMBIN TIME: CPT | Performed by: NURSE PRACTITIONER

## 2025-04-05 PROCEDURE — 84100 ASSAY OF PHOSPHORUS: CPT | Performed by: NURSE PRACTITIONER

## 2025-04-05 PROCEDURE — 84484 ASSAY OF TROPONIN QUANT: CPT | Performed by: NURSE PRACTITIONER

## 2025-04-05 PROCEDURE — 36415 COLL VENOUS BLD VENIPUNCTURE: CPT | Performed by: NURSE PRACTITIONER

## 2025-04-05 PROCEDURE — 36415 COLL VENOUS BLD VENIPUNCTURE: CPT | Performed by: INTERNAL MEDICINE

## 2025-04-05 PROCEDURE — 25000003 PHARM REV CODE 250: Performed by: NURSE PRACTITIONER

## 2025-04-05 PROCEDURE — 97161 PT EVAL LOW COMPLEX 20 MIN: CPT

## 2025-04-05 PROCEDURE — 99223 1ST HOSP IP/OBS HIGH 75: CPT | Mod: ,,, | Performed by: PSYCHIATRY & NEUROLOGY

## 2025-04-05 PROCEDURE — 85730 THROMBOPLASTIN TIME PARTIAL: CPT | Performed by: NURSE PRACTITIONER

## 2025-04-05 PROCEDURE — 83735 ASSAY OF MAGNESIUM: CPT | Performed by: NURSE PRACTITIONER

## 2025-04-05 PROCEDURE — 87798 DETECT AGENT NOS DNA AMP: CPT | Performed by: INTERNAL MEDICINE

## 2025-04-05 PROCEDURE — 95819 EEG AWAKE AND ASLEEP: CPT | Mod: 26,,, | Performed by: PSYCHIATRY & NEUROLOGY

## 2025-04-05 PROCEDURE — 92523 SPEECH SOUND LANG COMPREHEN: CPT

## 2025-04-05 PROCEDURE — 97165 OT EVAL LOW COMPLEX 30 MIN: CPT

## 2025-04-05 PROCEDURE — 25000003 PHARM REV CODE 250: Performed by: INTERNAL MEDICINE

## 2025-04-05 PROCEDURE — 83036 HEMOGLOBIN GLYCOSYLATED A1C: CPT | Performed by: NURSE PRACTITIONER

## 2025-04-05 RX ORDER — ONDANSETRON HYDROCHLORIDE 2 MG/ML
4 INJECTION, SOLUTION INTRAVENOUS EVERY 6 HOURS PRN
Status: DISCONTINUED | OUTPATIENT
Start: 2025-04-05 | End: 2025-04-07 | Stop reason: HOSPADM

## 2025-04-05 RX ORDER — DOCUSATE SODIUM 100 MG/1
200 CAPSULE, LIQUID FILLED ORAL 2 TIMES DAILY
Status: DISCONTINUED | OUTPATIENT
Start: 2025-04-05 | End: 2025-04-07 | Stop reason: HOSPADM

## 2025-04-05 RX ADMIN — ONDANSETRON 4 MG: 2 INJECTION INTRAMUSCULAR; INTRAVENOUS at 05:04

## 2025-04-05 RX ADMIN — LACTULOSE 30 G: 10 SOLUTION ORAL at 05:04

## 2025-04-05 RX ADMIN — DOCUSATE SODIUM 200 MG: 100 CAPSULE, LIQUID FILLED ORAL at 05:04

## 2025-04-05 RX ADMIN — ACETAMINOPHEN 1000 MG: 500 TABLET ORAL at 07:04

## 2025-04-05 RX ADMIN — ACETAMINOPHEN 1000 MG: 500 TABLET ORAL at 04:04

## 2025-04-05 RX ADMIN — BUTALBITAL, ACETAMINOPHEN, AND CAFFEINE 1 TABLET: 50; 325; 40 TABLET ORAL at 02:04

## 2025-04-05 RX ADMIN — BUTALBITAL, ACETAMINOPHEN, AND CAFFEINE 1 TABLET: 50; 325; 40 TABLET ORAL at 01:04

## 2025-04-05 RX ADMIN — BUTALBITAL, ACETAMINOPHEN, AND CAFFEINE 1 TABLET: 50; 325; 40 TABLET ORAL at 09:04

## 2025-04-05 NOTE — ASSESSMENT & PLAN NOTE
Resolved now. Unclear etiology, discussed diagnostic possibilities--seizure, confusional migraine, syncope. Plan EEG and LP is ordered

## 2025-04-05 NOTE — PT/OT/SLP EVAL
[facility]  Speech Language Pathology Department  Cognitive-Communication Evaluation    Patient Name:  Yesi Porter   MRN:  [id number]    Recommendations     General recommendations:  SLP intervention not indicated  Communication strategies:  none    Discharge therapy intensity: No Therapy Indicated  Barriers to safe discharge: none    History     Yesi Porter is a/n 29 y.o. female admitted for seizure-like activity, CVA ruleout, acute metabolic encephalopathy.     Past Medical History:   Diagnosis Date    Depression      Past Surgical History:   Procedure Laterality Date    ABCESS DRAINAGE       SECTION         Previous level of Function  Patient was Independent in prior level of function. Patient lives with her two children. Patient works as a .    Imaging   Results for orders placed during the hospital encounter of 25    MRI Brain Without Contrast    Narrative  EXAMINATION  MRI BRAIN WITHOUT CONTRAST    CLINICAL HISTORY  Stroke, follow up;    TECHNIQUE  Multiplanar, multisequence MR images were obtained without the intravenous administration of gadolinium-based contrast media.    COMPARISON  2025 non-contrast head CT    FINDINGS  Exam quality: Limited secondary to patient movement throughout image acquisition, with resulting artifact.    Parenchyma: No restricted diffusion or other suggestion of acute ischemic insult.  No convincing evidence of intraparenchymal hemorrhage.  Differentiation of the gray-white border is grossly preserved.  No discrete mass, localized mass effect, or midline shift appreciated.    CSF spaces: No evidence of extra-axial blood, expansile collection, or mass-like focal findings.  Normal size and configuration of the ventricles.  The basal cisterns are preserved.  Sulcal volume appears normal for patient age.    Sella/Suprasellar structures: No abnormalities.    Vasculature: Normal flow signal voids within the large  intracranial arteries.  No focal abnormality of the dural sinuses.    Other findings: No abnormalities of the skull or scalp.  Mastoid air cells are well aerated.  Facial cavities are clear, with no fluid level.    IMPRESSION  1. Motion degraded exam secondary to patient head movement during acquisition.  2. Within limitations, no convincing acute or focal intracranial abnormality.      Electronically signed by: Walker Silva  Date:    2025  Time:    19:53    Subjective     Patient awake, calm, and cooperative.  Patient goals: none stated   Spiritual/Cultural/Anglican Beliefs/Practices that affect care: no    Pain/Comfort: Pain Rating 1: 0/10    Respiratory Status:  room air    Objective     ORAL MUSCULATURE  Dentition: own teeth  Facial Movement: WFL  Buccal Strength & Mobility: WFL  Mandibular Strength & Mobility: WFL  Oral Labial Strength & Mobility: WFL  Lingual Strength & Mobility: WFL    SPEECH PRODUCTION  Phoneme Production: adequate  Voice Quality: adequate  Voice Production: adequate  Speech Rate: appropriate  Loudness: acceptable  Respiration: WFL for speech  Resonance: adequate  Prosody: adequate  Speech Intelligibility  Known Context: Greater that 90%  Unknown Context: Greater that 90%    AUDITORY COMPREHENSION  Following Directions:  2-Step: 100%  3-Step: 100%  Yes/No Questions:  Biographical: 100%  Environmental: 100%  Simple: 100%  Complex: 100%    VERBAL EXPRESSION  Automatic Speech:  Functional needs: 100%  Days of the week: 100%  Countin%    Wh- Questions:  Object name: 100%  Object function: 100%  Complex: 100%    COGNITION  Orientation:  Person: yes  Place: yes  Time: yes  Situation: yes   Attention:  Focused: Within Functional Limits  Pragmatics:  WFL  Memory:  Immediate: 100%  Delayed: 100%  Short Term: 100%  Long Term: 100%  Problem Solving  Functional simple: 100%  Functional complex: 100%  Organization:  Convergent thinking: Within Functional Limits  Divergent thinking: Within  Functional Limits      Assessment     Patient presents with intact cognitive-linguistic functioning. Patient denies change in attention, memory, speech, and language. Patient passed Diaz swallow screen and denies difficulty chewing/swallowing. SLP services not indicated at this time. Please re-consult as needed.    Goals     Multidisciplinary Problems       SLP Goals       Not on file                  Patient Education     Patient and family were provided with verbal education regarding results of evaluation.  Understanding was verbalized.    Plan     SLP Follow-Up:  No      Time Tracking     SLP Treatment Date:   04/05/25  Speech Start Time:  1345  Speech Stop Time:  1400     Speech Total Time (min):  15 min    Billable minutes:  Evaluation of Speech Sound Production with Comprehension and Expression, 15 minutes     04/05/2025

## 2025-04-05 NOTE — CONSULTS
[facility] - 4th Floor Medical Telemetry  Neurology  Consult Note    Patient Name: Yesi Porter  MRN: [id number]  Admission Date: 2025  Hospital Length of Stay: 1 days  Code Status: Full Code   Attending Provider: Ama Esparza MD   Consulting Provider: Ny Cummings MD  Primary Care Physician: Mitzi Dow FNP  Principal Problem:<principal problem not specified>    Inpatient Consult to Neurology Services (General Neurology)  Consult performed by: Ny Cummings MD  Consult ordered by: Ama Esparza MD         Subjective:     Chief Complaint:  confusion     HPI:   29 y.o. female with a PMHx of anxiety/depression who presented to [facility] via EMS on 2025 with c/o altered mental status that began on the morning of presentation.  Patient initially presented to an [facility] urgent care around 9:00 a.m. She was on the phone with her mother, who noted she was not responding 15 minutes after being at urgent care. Patient does not recall events. She was awake but not responsive for several hours.  No seizure activity was noted. She is now with normal mentation without any complaints. Denies recent illness. Did have a frontal headache prior to symptoms occurring and still has HA. It is mild but present. Denies vision issues. NO hx of migraine     Past Medical History:   Diagnosis Date    Depression        Past Surgical History:   Procedure Laterality Date    ABCESS DRAINAGE       SECTION         Review of patient's allergies indicates:  No Known Allergies      No current facility-administered medications on file prior to encounter.     Current Outpatient Medications on File Prior to Encounter   Medication Sig    hydrOXYzine pamoate (VISTARIL) 25 MG Cap Take 1 capsule (25 mg total) by mouth every evening.    metroNIDAZOLE (NUVESSA) 1.3 % (65 mg/5 gram) Gel Place 1 Applicatorful vaginally every evening.    norelgestromin-ethinyl estradiol 150-35 mcg/24 hr Place 1 patch onto  the skin every 7 days.     Family History       Problem Relation (Age of Onset)    Arrhythmia Mother    Atrial fibrillation Mother    Liver cancer Maternal Grandfather    Lung cancer Paternal Grandmother          Tobacco Use    Smoking status: Never    Smokeless tobacco: Never   Substance and Sexual Activity    Alcohol use: Not Currently     Comment: on occas    Drug use: Never    Sexual activity: Yes     Partners: Male     Review of Systems  Objective:     Vital Signs (Most Recent):  Temp: 98.9 °F (37.2 °C) (04/05/25 1049)  Pulse: 94 (04/05/25 1049)  Resp: 18 (04/05/25 1049)  BP: 107/72 (04/05/25 1043)  SpO2: 100 % (04/05/25 1049) Vital Signs (24h Range):  Temp:  [97 °F (36.1 °C)-100 °F (37.8 °C)] 98.9 °F (37.2 °C)  Pulse:  [] 94  Resp:  [10-22] 18  SpO2:  [96 %-100 %] 100 %  BP: ()/(56-78) 107/72     Weight: 78.9 kg (174 lb)  Body mass index is 29.87 kg/m².     Physical Exam        NAD  Alert and oriented  Cognition and perception intact  No aphasia  EOMI  No facial asymmetry  No dysarthria  Moves all extremities symmetrically  No gross coordination abnormalities    Significant Labs:   Recent Lab Results         04/05/25  0635   04/04/25  1609   04/04/25  1512   04/04/25  1312        Albumin/Globulin Ratio 1.1             Albumin 3.4             ALP 53             ALT 11             Ammonia     21.3         Anion Gap 11.0             Ao peak rené   1.9           Ao VTI   30.8           PTT 29.2  Comment: For Minimal Heparin Infusion, the goal aPTT 64-85 seconds corresponds to an anti-Xa of 0.3-0.5.    For Low Intensity and High Intensity Heparin, the goal aPTT  seconds corresponds to an anti-Xa of 0.3-0.7             AST 13             AV valve area   1.9           VIRGILIO by Velocity Ratio   1.9           AV mean gradient   9           AV index (prosthetic)   0.74           AV peak gradient   14           AV Velocity Ratio   0.74           Baso # 0.05             Basophil % 0.3             BILIRUBIN  TOTAL 0.3             BSA   1.87           BUN 7.6             BUN/CREAT RATIO 11             Calcium 8.1             Chloride 109             Cholesterol Total     133         CO2 18             Creatinine 0.67             Left Ventricle Relative Wall Thickness   0.39           E/A ratio   1.13           eGFR >60  Comment: Estimated GFR calculated using the CKD-EPI creatinine (2021) equation.             Eos # 0.03             Eos % 0.2             Estimated Avg Glucose 93.9             Folate     9.8         FS   32.6           Globulin, Total 3.0             Glucose 99             HDL     57         Hematocrit 34.1             Hemoglobin 10.5             Hemoglobin A1C External 4.9             HIV     Nonreactive         Immature Grans (Abs) 0.09             Immature Granulocytes 0.5             INR 1.1             IVSd   0.8           Lactic Acid Level       2.2       LVOT area   2.5           LDL Cholesterol     69.00  Comment: LDL calculated using the Friedewald equation.         LV EDV BP   97           LV Diastolic Volume Index   53.30           Left Ventricular End Diastolic Volume by Teichholz Method   97.30           Left Ventricular End Systolic Volume by Teichholz Method   37.90           LVIDd   4.6           LVIDs   3.1           LV mass   127.7           LV Mass Index   70.1           Left Ventricular Outflow Tract Mean Gradient   4.00           Left Ventricular Outflow Tract Mean Velocity   0.93           LVOT diameter   1.8           LVOT peak rené   1.4           LVOT stroke volume   57.7           LVOT peak VTI   22.7           LV ESV BP   38           LV Systolic Volume Index   20.9           Lymph # 2.44             LYMPH % 13.7             Magnesium  2.20             MCH 26.2             MCHC 30.8             MCV 85.0             Mono # 1.09             Mono % 6.1             MPV 10.3             MV valve area by continuity eq   3.87           MV mean gradient   2           MV peak gradient    3           MV Peak A Silvio   0.87           MV Peak E Silvio   0.98           MV VTI   14.9           Neut # 14.07             Neut % 79.2             nRBC 0.0             Phosphorus Level 3.4             Platelet Count 267             Potassium 4.0             PROTEIN TOTAL 6.4             PT 14.5             PW   0.9           RBC 4.01             RDW 13.9             RV/LV Ratio   0.59           RVDD   2.70           RV- barbosa basal diam   2.7           Sodium 138             Syphilis Antibody     Nonreactive         TAPSE   2.26           Total Cholesterol/HDL Ratio     2         Triglycerides     33         Triscuspid Valve Regurgitation Peak Gradient   25           TR Max Silvio   2.5           Troponin I <0.010             Very Low Density Lipoprotein     7         WBC 17.77             ZLVIDD   -0.91           ZLVIDS   -0.03                   Significant Imaging: I have reviewed all pertinent imaging results/findings within the past 24 hours.  MRI normal  Assessment and Plan:     Encephalopathy acute  Resolved now. Unclear etiology, discussed diagnostic possibilities--seizure, confusional migraine, syncope. Plan EEG and LP is ordered        VTE Risk Mitigation (From admission, onward)           Ordered     IP VTE HIGH RISK PATIENT  Once         04/04/25 1425     Place sequential compression device  Until discontinued         04/04/25 1425                    Thank you for your consult. I will follow-up with patient. Please contact us if you have any additional questions.    Ny Cummings MD  Neurology  [facility] - 4th Floor Medical Telemetry

## 2025-04-05 NOTE — SUBJECTIVE & OBJECTIVE
Past Medical History:   Diagnosis Date    Depression        Past Surgical History:   Procedure Laterality Date    ABCESS DRAINAGE       SECTION         Review of patient's allergies indicates:  No Known Allergies      No current facility-administered medications on file prior to encounter.     Current Outpatient Medications on File Prior to Encounter   Medication Sig    hydrOXYzine pamoate (VISTARIL) 25 MG Cap Take 1 capsule (25 mg total) by mouth every evening.    metroNIDAZOLE (NUVESSA) 1.3 % (65 mg/5 gram) Gel Place 1 Applicatorful vaginally every evening.    norelgestromin-ethinyl estradiol 150-35 mcg/24 hr Place 1 patch onto the skin every 7 days.     Family History       Problem Relation (Age of Onset)    Arrhythmia Mother    Atrial fibrillation Mother    Liver cancer Maternal Grandfather    Lung cancer Paternal Grandmother          Tobacco Use    Smoking status: Never    Smokeless tobacco: Never   Substance and Sexual Activity    Alcohol use: Not Currently     Comment: on occas    Drug use: Never    Sexual activity: Yes     Partners: Male     Review of Systems  Objective:     Vital Signs (Most Recent):  Temp: 98.9 °F (37.2 °C) (25 1049)  Pulse: 94 (25 1049)  Resp: 18 (25 1049)  BP: 107/72 (25 1043)  SpO2: 100 % (25 1049) Vital Signs (24h Range):  Temp:  [97 °F (36.1 °C)-100 °F (37.8 °C)] 98.9 °F (37.2 °C)  Pulse:  [] 94  Resp:  [10-22] 18  SpO2:  [96 %-100 %] 100 %  BP: ()/(56-78) 107/72     Weight: 78.9 kg (174 lb)  Body mass index is 29.87 kg/m².     Physical Exam        NAD  Alert and oriented  Cognition and perception intact  No aphasia  EOMI  No facial asymmetry  No dysarthria  Moves all extremities symmetrically  No gross coordination abnormalities    Significant Labs:   Recent Lab Results         25  0635   25  1609   25  1512   25  1312        Albumin/Globulin Ratio 1.1             Albumin 3.4             ALP 53             ALT  11             Ammonia     21.3         Anion Gap 11.0             Ao peak rené   1.9           Ao VTI   30.8           PTT 29.2  Comment: For Minimal Heparin Infusion, the goal aPTT 64-85 seconds corresponds to an anti-Xa of 0.3-0.5.    For Low Intensity and High Intensity Heparin, the goal aPTT  seconds corresponds to an anti-Xa of 0.3-0.7             AST 13             AV valve area   1.9           VIRGILIO by Velocity Ratio   1.9           AV mean gradient   9           AV index (prosthetic)   0.74           AV peak gradient   14           AV Velocity Ratio   0.74           Baso # 0.05             Basophil % 0.3             BILIRUBIN TOTAL 0.3             BSA   1.87           BUN 7.6             BUN/CREAT RATIO 11             Calcium 8.1             Chloride 109             Cholesterol Total     133         CO2 18             Creatinine 0.67             Left Ventricle Relative Wall Thickness   0.39           E/A ratio   1.13           eGFR >60  Comment: Estimated GFR calculated using the CKD-EPI creatinine (2021) equation.             Eos # 0.03             Eos % 0.2             Estimated Avg Glucose 93.9             Folate     9.8         FS   32.6           Globulin, Total 3.0             Glucose 99             HDL     57         Hematocrit 34.1             Hemoglobin 10.5             Hemoglobin A1C External 4.9             HIV     Nonreactive         Immature Grans (Abs) 0.09             Immature Granulocytes 0.5             INR 1.1             IVSd   0.8           Lactic Acid Level       2.2       LVOT area   2.5           LDL Cholesterol     69.00  Comment: LDL calculated using the Friedewald equation.         LV EDV BP   97           LV Diastolic Volume Index   53.30           Left Ventricular End Diastolic Volume by Teichholz Method   97.30           Left Ventricular End Systolic Volume by Teichholz Method   37.90           LVIDd   4.6           LVIDs   3.1           LV mass   127.7           LV Mass  Index   70.1           Left Ventricular Outflow Tract Mean Gradient   4.00           Left Ventricular Outflow Tract Mean Velocity   0.93           LVOT diameter   1.8           LVOT peak silvio   1.4           LVOT stroke volume   57.7           LVOT peak VTI   22.7           LV ESV BP   38           LV Systolic Volume Index   20.9           Lymph # 2.44             LYMPH % 13.7             Magnesium  2.20             MCH 26.2             MCHC 30.8             MCV 85.0             Mono # 1.09             Mono % 6.1             MPV 10.3             MV valve area by continuity eq   3.87           MV mean gradient   2           MV peak gradient   3           MV Peak A Silvio   0.87           MV Peak E Silvio   0.98           MV VTI   14.9           Neut # 14.07             Neut % 79.2             nRBC 0.0             Phosphorus Level 3.4             Platelet Count 267             Potassium 4.0             PROTEIN TOTAL 6.4             PT 14.5             PW   0.9           RBC 4.01             RDW 13.9             RV/LV Ratio   0.59           RVDD   2.70           RV- barbosa basal diam   2.7           Sodium 138             Syphilis Antibody     Nonreactive         TAPSE   2.26           Total Cholesterol/HDL Ratio     2         Triglycerides     33         Triscuspid Valve Regurgitation Peak Gradient   25           TR Max Silvio   2.5           Troponin I <0.010             Very Low Density Lipoprotein     7         WBC 17.77             ZLVIDD   -0.91           ZLVIDS   -0.03                   Significant Imaging: I have reviewed all pertinent imaging results/findings within the past 24 hours.  MRI normal

## 2025-04-05 NOTE — PT/OT/SLP EVAL
Occupational Therapy  Evaluation/Discharge Summary     Name: Yesi Porter  MRN: [id number]  Admitting Diagnosis:   1. AMS (altered mental status)    2. Leukocytosis, unspecified type    3. Acute CVA (cerebrovascular accident)        Recent Surgery: * No surgery found *      Recommendations:     Discharge therapy intensity: No Therapy Indicated   Discharge Equipment Recommendations:  none  Barriers to discharge:  None    Assessment:     Yesi Porter is a 29 y.o. female with a medical diagnosis of The primary encounter diagnosis was AMS (altered mental status). Diagnoses of Leukocytosis, unspecified type and Acute CVA (cerebrovascular accident) were also pertinent to this visit.  .  She presents with the following performance deficits affecting function:  (n/a).     Pt remains with complaints of lethargy, dizziness, and headache at this time. Pt performing at baseline for ADLs and functional mobility. BUE ROM/MMT/FMC/GMC/sensation all WFL. Pt reports no visual deficits. Pt does not require further OT services at this time. D/c OT.       Rehab Prognosis: Good; patient would benefit from acute skilled OT services to address these deficits and reach maximum level of function.       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      Patient was found supine with telemetry upon OT entry to room.    General Precautions: Standard, fall  Orthopedic Precautions: N/A  Braces: N/A    Vital Signs: Blood Pressure: 99/64 HR 99 supine; seated 107/72 HR 93    Bed Mobility:    Patient completed Scooting/Bridging with modified independence  Patient completed Supine to Sit with modified independence  Patient completed Sit to Supine with modified independence    Functional Mobility/Transfers:  Patient completed Sit <> Stand Transfer with modified independence  with  no assistive device   Functional Mobility: mod I without AD     Activities of Daily Living:  Lower Body Dressing: modified independence      AMPA 6 Click ADL:  AMPA Total Score: 24    Functional Cognition:  Intact    Visual Perceptual Skills:  Intact    Upper Extremity Function:  Right Upper Extremity:   WFL    Left Upper Extremity:  WFL    Balance:   Intact    Therapeutic Positioning  Risk for acquired pressure injuries is decreased due to ability to get to BSC/toilet with assist.    OT interventions performed during the course of today's session:   Education was provided on benefits of and recommendations for therapeutic positioning    Skin assessment:  visible skin    Findings: no redness or breakdown noted    OT recommendations for therapeutic positioning throughout hospitalization:   Follow [facility] Pressure Injury Prevention Protocol        Patient Education:  Patient and parent/s were provided with verbal education education regarding OT role/goals/POC, fall prevention, and safety awareness.  Understanding was verbalized.     Patient left supine with all lines intact, call button in reach, and mother present.    GOALS:   Multidisciplinary Problems       Occupational Therapy Goals       Not on file                    History:     Past Medical History:   Diagnosis Date    Depression          Past Surgical History:   Procedure Laterality Date    ABCESS DRAINAGE        SECTION         Time Tracking:     OT Date of Treatment: 25  OT Start Time: 1025  OT Stop Time: 1041  OT Total Time (min): 16 min    Billable Minutes:Evaluation 16    2025

## 2025-04-05 NOTE — HPI
29 y.o. female with a PMHx of anxiety/depression who presented to [facility] via EMS on 4/4/2025 with c/o altered mental status that began on the morning of presentation.  Patient initially presented to an [facility] urgent care around 9:00 a.m. She was on the phone with her mother, who noted she was not responding 15 minutes after being at urgent care. Patient does not recall events. She was awake but not responsive for several hours.  No seizure activity was noted. She is now with normal mentation without any complaints. Denies recent illness. Did have a frontal headache prior to symptoms occurring and still has HA. It is mild but present. Denies vision issues. NO hx of migraine

## 2025-04-05 NOTE — PT/OT/SLP EVAL
Physical Therapy Evaluation and Discharge Note    Patient Name:  Yesi Porter   MRN:  [id number]    Recommendations:     Discharge therapy intensity: No Therapy Indicated   Discharge Equipment Recommendations: none   Barriers to discharge: None    Assessment:     Yesi Porter is a 29 y.o. female admitted with a medical diagnosis of seizure-like activity, CVA ruleout, acute metabolic encephalopathy.  At this time, patient is functioning at their prior level of function and does not require further acute PT services.     Recent Surgery: * No surgery found *      Plan:     During this hospitalization, patient does not require further acute PT services.  Please re-consult if situation changes.      Subjective     Chief Complaint: sleepy   Patient/Family Comments/goals: to go home   Pain/Comfort:  Pain Rating 1: 0/10    Patients cultural, spiritual, Congregational conflicts given the current situation: no    Living Environment:  Pt lives with two young children (ages 8 & 4) in [facility] with 4 MARCOS, R handrail when ascending.   Prior to admission, patients level of function was independent, works as lab assistance and .  Equipment used at home: none.  DME owned (not currently used): none.  Upon discharge, patient will have assistance from mother.    Objective:     Communicated with NSG prior to session.  Patient found HOB elevated with telemetry upon PT entry to room.    General Precautions: Standard, seizure, fall    Orthopedic Precautions:N/A   Braces: N/A  Respiratory Status: Room air  Blood Pressure:   96/64, 99 bpm HOB elevated  107/72, 93 bpm in seated     Exams:  Cognitive Exam:  Patient is oriented to Person, Place, Time, and Situation  Sensation:    -       Intact  light/touch BLEs  RLE ROM: WFL  RLE Strength: grossly 5/5  LLE ROM: WFL  LLE Strength: grossly 5/5    Functional Mobility:  Bed Mobility:     Supine to Sit: independence  Sit to Supine: independence  Transfers:     Sit to Stand:  independence with no  AD  Gait: 250+ feet with no AD and independence; no LOB or safety concerns   Balance: independent for static and dynamic balance   Stairs:  Pt ascended/descended 1 flight(s) with No Assistive Device with right handrail with [place].     AM-PAC 6 CLICK MOBILITY  Total Score:24       Treatment and Education:  Patient and mother provided with verbal education education regarding PT role/goals/POC, fall prevention, safety awareness, and discharge/DME recommendations.  Understanding was verbalized.     Patient left HOB elevated with all lines intact, call button in reach, and mother present.    GOALS:   Multidisciplinary Problems       Physical Therapy Goals       Not on file                    History:     Past Medical History:   Diagnosis Date    Depression        Past Surgical History:   Procedure Laterality Date    ABCESS DRAINAGE       SECTION         Time Tracking:     PT Received On: 25  PT Start Time: 1025     PT Stop Time: 1040  PT Total Time (min): 15 min     Billable Minutes: Evaluation low      2025

## 2025-04-06 LAB
ALBUMIN SERPL-MCNC: 3.7 G/DL (ref 3.5–5)
ALBUMIN/GLOB SERPL: 1.1 RATIO (ref 1.1–2)
ALP SERPL-CCNC: 67 UNIT/L (ref 40–150)
ALT SERPL-CCNC: 10 UNIT/L (ref 0–55)
ANION GAP SERPL CALC-SCNC: 12 MEQ/L
AST SERPL-CCNC: 13 UNIT/L (ref 11–45)
BASOPHILS # BLD AUTO: 0.05 X10(3)/MCL
BASOPHILS NFR BLD AUTO: 0.3 %
BILIRUB SERPL-MCNC: 0.3 MG/DL
BUN SERPL-MCNC: 5.9 MG/DL (ref 7–18.7)
CALCIUM SERPL-MCNC: 8.5 MG/DL (ref 8.4–10.2)
CHLORIDE SERPL-SCNC: 106 MMOL/L (ref 98–107)
CO2 SERPL-SCNC: 20 MMOL/L (ref 22–29)
CREAT SERPL-MCNC: 0.69 MG/DL (ref 0.55–1.02)
CREAT/UREA NIT SERPL: 9
CRP SERPL-MCNC: 127.4 MG/L
EOSINOPHIL # BLD AUTO: 0.04 X10(3)/MCL (ref 0–0.9)
EOSINOPHIL NFR BLD AUTO: 0.2 %
ERYTHROCYTE [DISTWIDTH] IN BLOOD BY AUTOMATED COUNT: 13.7 % (ref 11.5–17)
GFR SERPLBLD CREATININE-BSD FMLA CKD-EPI: >60 ML/MIN/1.73/M2
GLOBULIN SER-MCNC: 3.5 GM/DL (ref 2.4–3.5)
GLUCOSE SERPL-MCNC: 71 MG/DL (ref 74–100)
HCT VFR BLD AUTO: 34.4 % (ref 37–47)
HGB BLD-MCNC: 10.9 G/DL (ref 12–16)
IMM GRANULOCYTES # BLD AUTO: 0.09 X10(3)/MCL (ref 0–0.04)
IMM GRANULOCYTES NFR BLD AUTO: 0.5 %
LYMPHOCYTES # BLD AUTO: 1.71 X10(3)/MCL (ref 0.6–4.6)
LYMPHOCYTES NFR BLD AUTO: 8.7 %
MAGNESIUM SERPL-MCNC: 2.1 MG/DL (ref 1.6–2.6)
MCH RBC QN AUTO: 26.1 PG (ref 27–31)
MCHC RBC AUTO-ENTMCNC: 31.7 G/DL (ref 33–36)
MCV RBC AUTO: 82.3 FL (ref 80–94)
MONOCYTES # BLD AUTO: 1.3 X10(3)/MCL (ref 0.1–1.3)
MONOCYTES NFR BLD AUTO: 6.6 %
NEUTROPHILS # BLD AUTO: 16.57 X10(3)/MCL (ref 2.1–9.2)
NEUTROPHILS NFR BLD AUTO: 83.7 %
NRBC BLD AUTO-RTO: 0 %
PHOSPHATE SERPL-MCNC: 2.9 MG/DL (ref 2.3–4.7)
PLATELET # BLD AUTO: 303 X10(3)/MCL (ref 130–400)
PMV BLD AUTO: 11.1 FL (ref 7.4–10.4)
POTASSIUM SERPL-SCNC: 4 MMOL/L (ref 3.5–5.1)
PROT SERPL-MCNC: 7.2 GM/DL (ref 6.4–8.3)
RBC # BLD AUTO: 4.18 X10(6)/MCL (ref 4.2–5.4)
SODIUM SERPL-SCNC: 138 MMOL/L (ref 136–145)
WBC # BLD AUTO: 19.76 X10(3)/MCL (ref 4.5–11.5)

## 2025-04-06 PROCEDURE — 86140 C-REACTIVE PROTEIN: CPT | Performed by: INTERNAL MEDICINE

## 2025-04-06 PROCEDURE — 25000003 PHARM REV CODE 250: Performed by: NURSE PRACTITIONER

## 2025-04-06 PROCEDURE — 21400001 HC TELEMETRY ROOM

## 2025-04-06 PROCEDURE — 85025 COMPLETE CBC W/AUTO DIFF WBC: CPT | Performed by: NURSE PRACTITIONER

## 2025-04-06 PROCEDURE — 63600175 PHARM REV CODE 636 W HCPCS: Mod: JZ,TB | Performed by: PSYCHIATRY & NEUROLOGY

## 2025-04-06 PROCEDURE — 63600175 PHARM REV CODE 636 W HCPCS: Performed by: INTERNAL MEDICINE

## 2025-04-06 PROCEDURE — 80053 COMPREHEN METABOLIC PANEL: CPT | Performed by: NURSE PRACTITIONER

## 2025-04-06 PROCEDURE — 99233 SBSQ HOSP IP/OBS HIGH 50: CPT | Mod: ,,, | Performed by: PSYCHIATRY & NEUROLOGY

## 2025-04-06 PROCEDURE — 84100 ASSAY OF PHOSPHORUS: CPT | Performed by: INTERNAL MEDICINE

## 2025-04-06 PROCEDURE — 83735 ASSAY OF MAGNESIUM: CPT | Performed by: INTERNAL MEDICINE

## 2025-04-06 PROCEDURE — 36415 COLL VENOUS BLD VENIPUNCTURE: CPT | Performed by: NURSE PRACTITIONER

## 2025-04-06 PROCEDURE — 25000003 PHARM REV CODE 250: Performed by: INTERNAL MEDICINE

## 2025-04-06 RX ORDER — PROMETHAZINE HYDROCHLORIDE 25 MG/ML
12.5 INJECTION, SOLUTION INTRAMUSCULAR; INTRAVENOUS ONCE
Status: COMPLETED | OUTPATIENT
Start: 2025-04-06 | End: 2025-04-06

## 2025-04-06 RX ORDER — CETIRIZINE HYDROCHLORIDE 10 MG/1
10 TABLET ORAL DAILY PRN
Status: DISCONTINUED | OUTPATIENT
Start: 2025-04-06 | End: 2025-04-07 | Stop reason: HOSPADM

## 2025-04-06 RX ORDER — DIPHENHYDRAMINE HYDROCHLORIDE 50 MG/ML
12.5 INJECTION, SOLUTION INTRAMUSCULAR; INTRAVENOUS ONCE
Status: COMPLETED | OUTPATIENT
Start: 2025-04-06 | End: 2025-04-06

## 2025-04-06 RX ORDER — KETOROLAC TROMETHAMINE 30 MG/ML
30 INJECTION, SOLUTION INTRAMUSCULAR; INTRAVENOUS ONCE
Status: COMPLETED | OUTPATIENT
Start: 2025-04-06 | End: 2025-04-06

## 2025-04-06 RX ADMIN — DIPHENHYDRAMINE HYDROCHLORIDE 12.5 MG: 50 INJECTION INTRAMUSCULAR; INTRAVENOUS at 11:04

## 2025-04-06 RX ADMIN — DOCUSATE SODIUM 200 MG: 100 CAPSULE, LIQUID FILLED ORAL at 10:04

## 2025-04-06 RX ADMIN — CETIRIZINE HYDROCHLORIDE 10 MG: 10 TABLET, FILM COATED ORAL at 05:04

## 2025-04-06 RX ADMIN — DOCUSATE SODIUM 200 MG: 100 CAPSULE, LIQUID FILLED ORAL at 08:04

## 2025-04-06 RX ADMIN — KETOROLAC TROMETHAMINE 30 MG: 30 INJECTION, SOLUTION INTRAMUSCULAR; INTRAVENOUS at 11:04

## 2025-04-06 RX ADMIN — PROMETHAZINE HYDROCHLORIDE 12.5 MG: 25 INJECTION INTRAMUSCULAR; INTRAVENOUS at 11:04

## 2025-04-06 RX ADMIN — ONDANSETRON 4 MG: 2 INJECTION INTRAMUSCULAR; INTRAVENOUS at 06:04

## 2025-04-06 RX ADMIN — ACETAMINOPHEN 1000 MG: 500 TABLET ORAL at 05:04

## 2025-04-06 NOTE — CONSULTS
Tele-Infectious Diseases Consultation      Patient Name: Yesi Porter  Patient : 1995  Age/Sex: 29 y.o. female   Room/Bed: 435/435 A  Admission Date/Time: 2025 10:36 AM  Date of consultation:  2025  Referring MD: Ama Esparza MD       Assessment and Plan:     Yesi Porter is a 29 y.o. female with:   Leukocytosis without a clear source    This is a 29-year-old previously healthy female who presented with acute altered mental status, initially with a GCS of 9, transient urinary incontinence, and subsequent encephalopathy which resolved by the time neurology evaluated her. She currently reports mild headache and a few episodes of vomiting, but denies fever, respiratory, abdominal, or urinary symptoms. Notably, her WBC count has remained elevated, currently 15.1, peaked at 19.8 with persistent neutrophil predominance over the past several days, despite normal imaging MRI brain, CT chest, negative respiratory viral panel, and sterile blood cultures.     She remains hemodynamically stable and afebrile. While the etiology of her leukocytosis is still unclear, CNS infection appears unlikely based on clinical course and imaging. Given ongoing leukocytosis and her transient encephalopathy, the plan is to proceed with lumbar puncture today to rule out CNS infection although seems unlikely.  Her leukocytosis could be secondary to stress response from possible seizure?  Less likely hematologic malignancy.  We will obtain CT abdomen to complete the workup but I also have low suspicion for intra-abdominal process.    Recommendations:     Pending lumbar puncture  Obtain CT abdomen  Agree with holding off on antibiotic    The antibiotics being administered require intensive monitoring for drug toxicity    All questions and concerns addressed with patient and understands and agrees with plan.      Thank you for allowing us to contribute to this patient's care. Please call ID with any questions.     Shamika Al  MD [facility]  Attending, Infectious Disease     Reason for consultation:      Leukocytosis without a clear source    Chief complain:      Altered mental status    History of present illness:      A 29-year-old female presents to the emergency department via EMS for evaluation of altered mental status. Per EMS, the patient was evaluated at an urgent care center earlier this morning around 0915 for general malaise. Shortly after being placed in a room and seen by staff, she became acutely nonverbal. Urgent care staff noted urinary incontinence, and EMS was called. She was assigned a GCS of 9 by EMS on scene.  Patient reports feeling fatigued and tired 1 day before she went to the urgent care. She reports no fever, chills or night sweats.  She reports no ill contacts.  She reports no abdominal pain or any change in her bowel movement.  No respiratory symptoms.  She reports having mild headache. She reports few episodes of vomiting    Upon EMS arrival and during transport, the patient remained nonverbal but was observed to be moving all extremities and maintaining her airway independently. Capillary blood glucose en route was 124 mg/dL. The patient has no known past medical history. She is currently taking oral contraceptives.  Vitals on admission showed heart rate 124, blood pressure 120/80.  Laboratory workup showed WBC 19.88 with neutrophil predominance, lactic acid 2.2. Respiratory viral panel was unremarkable.  She had 2 sets of blood culture obtained which remain negative.  Patient was seen by neurology team on 4/5 regarding acute encephalopathy which resolved at the time of their evaluation.  Patient underwent MRI which showed no acute or focal intracranial abnormalities.  CT chest showed no acute infiltrates or effusion.    Review of system:      A review of the patient's history and complaints did not reveal any medical problems other than those outlined in the HPI. Specifically, there were no additional  constitutional complaints, and no complaints of problems with the eyes, ears, nose, and mouth, cardiovascular, respiratory, gastrointestinal, musculoskeletal, neurological, integumentary, psychiatric, endocrine, or hematological systems.    Antibiotics Received:      None    Social history:      Social History     Socioeconomic History    Marital status: Single   Occupational History     Comment: employed   Tobacco Use    Smoking status: Never    Smokeless tobacco: Never   Substance and Sexual Activity    Alcohol use: Not Currently     Comment: on occas    Drug use: Never    Sexual activity: Yes     Partners: Male     Social Drivers of Health     Financial Resource Strain: Patient Unable To Answer (2025)    Overall Financial Resource Strain (CARDIA)     Difficulty of Paying Living Expenses: Patient unable to answer   Food Insecurity: Patient Unable To Answer (2025)    Hunger Vital Sign     Worried About Running Out of Food in the Last Year: Patient unable to answer     Ran Out of Food in the Last Year: Patient unable to answer   Transportation Needs: Patient Unable To Answer (2025)    PRAPARE - Transportation     Lack of Transportation (Medical): Patient unable to answer     Lack of Transportation (Non-Medical): Patient unable to answer   Stress: Patient Unable To Answer (2025)    [employer] - Occupational Stress Questionnaire     Feeling of Stress : Patient unable to answer   Housing Stability: Patient Unable To Answer (2025)    Housing Stability Vital Sign     Unable to Pay for Housing in the Last Year: Patient unable to answer     Homeless in the Last Year: Patient unable to answer       Past medical history:     Past Medical History:   Diagnosis Date    Depression        Past Surgical history:     Past Surgical History:   Procedure Laterality Date    ABCESS DRAINAGE       SECTION         Family history:     Family History   Problem Relation Name Age of  Onset    Atrial fibrillation Mother      Arrhythmia Mother      Liver cancer Maternal Grandfather      Lung cancer Paternal Grandmother         Allergy history:    Review of patient's allergies indicates:  No Known Allergies    Objective:    Vital signs:  Vitals:    25 0554 25 0718 25 1120 25 1448   BP:  (!) 91/59 111/78 110/72   Pulse:  88 92 86   Resp:  18 18 18   Temp: 98.3 °F (36.8 °C) 99.6 °F (37.6 °C) 99.1 °F (37.3 °C) 98.3 °F (36.8 °C)   TempSrc:  Oral Oral Oral   SpO2:  98% 100% 95%   Weight:       Height:         Temp (24hrs), Av.5 °F (36.9 °C), Min:98 °F (36.7 °C), Max:99.6 °F (37.6 °C)      Physical examination:  GEN: Awake, resting comfortably  EYES: EOMI, no scleral icterus  HENT: MMM. No oral lesions. Fair dentition.  NECK: Supple, no cervical lymphadenopathy or meningismus.   CARDIO: RRR, no murmur.  PULM/CHEST: CTAB. No increased work of breathing  ABD: Normal bowel sounds. Soft, not tender or distended. No HSM appreciated.  MSK: no obvious effusion, swelling, increased warmth, or erythema of major joints. No pedal edema.  SKIN: No rashes. No stigmata of endocarditis.  NEURO: AOx3. Speech fluent. Face symmetric.  PSYCH: Mood appropriate    Diagnostic data:     CBC  Recent Labs   Lab 25  1052 25  0635 25  0755   WBC 19.88* 17.77* 19.76*   HGB 11.5* 10.5* 10.9*    267 303   INR 1.0 1.1  --        BMP  Recent Labs   Lab 25  1052 25  0635 25  0755    138 138   K 3.9 4.0 4.0    109* 106   CO2 14* 18* 20*   BUN 9.5 7.6 5.9*   CREATININE 0.78 0.67 0.69   CALCIUM 9.5 8.1* 8.5   MG 1.50* 2.20 2.10   PHOS  --  3.4 2.9       Results for orders placed during the hospital encounter of 25    X-Ray Chest AP Portable    Narrative  EXAMINATION:  XR CHEST AP PORTABLE    CLINICAL HISTORY:  AMS of unknown etiology;    TECHNIQUE:  One view    COMPARISON:  2024.    FINDINGS:  Cardiopericardial silhouette is within normal  limits.  No acute dense focal or segmental consolidation, congestive process, pleural effusions or pneumothorax.    Impression  No acute cardiopulmonary process identified.      Electronically signed by: Magdy Branch  Date:    04/04/2025  Time:    11:20    Recent Labs   Lab 04/04/25  1052 04/05/25  0635 04/06/25  0755   WBC 19.88* 17.77* 19.76*   HGB 11.5* 10.5* 10.9*   HCT 36.4* 34.1* 34.4*    267 303     Estimated Creatinine Clearance: 123.4 mL/min (based on SCr of 0.69 mg/dL).    Lab Results   Component Value Date    CREATININE 0.69 04/06/2025    CREATININE 0.67 04/05/2025    CREATININE 0.78 04/04/2025    ALKPHOS 67 04/06/2025    ALKPHOS 53 04/05/2025    ALKPHOS 64 04/04/2025         Microbiology and ID tests:     Microbiology Results (last 7 days)       Procedure Component Value Units Date/Time    Blood culture #1 **CANNOT BE ORDERED STAT** [[id number]]  (Normal) Collected: 04/04/25 1122    Order Status: Completed Specimen: Blood Updated: 04/06/25 1401     Blood Culture No Growth At 48 Hours    Blood culture #2 **CANNOT BE ORDERED STAT** [[id number]]  (Normal) Collected: 04/04/25 1123    Order Status: Completed Specimen: Blood Updated: 04/06/25 1300     Blood Culture No Growth At 48 Hours    Cryptococcal antigen, CSF [[id number]]     Order Status: Sent Specimen: CSF (Spinal Fluid) from CSF Tap, Tube 2     Cerebrospinal Fluid Culture [[id number]]     Order Status: Sent Specimen: CSF (Spinal Fluid) from CSF Tap, Tube 1     AFB Smear [[id number]]     Order Status: Sent Specimen: CSF (Spinal Fluid) from Cerebrospinal Fluid     Mycobacteria and Nocardia Culture [[id number]]     Order Status: Sent Specimen: CSF (Spinal Fluid) from Cerebrospinal Fluid               Medications   Inpatient  Scheduled Meds:   docusate sodium  200 mg Oral BID     Continuous Infusions:  PRN Meds:.  Current Facility-Administered Medications:     acetaminophen, 1,000 mg, Oral, Q6H PRN    bisacodyL, 10 mg, Rectal, Daily PRN     butalbital-acetaminophen-caffeine -40 mg, 1 tablet, Oral, Q8H PRN    cetirizine, 10 mg, Oral, Daily PRN    labetalol, 10 mg, Intravenous, Q6H PRN    lorazepam, 2 mg, Intravenous, Q4H PRN    ondansetron, 4 mg, Intravenous, Q6H PRN    Home Meds  Current Outpatient Medications   Medication Instructions    hydrOXYzine pamoate (VISTARIL) 25 mg, Oral, Nightly    metroNIDAZOLE (NUVESSA) 1.3 % (65 mg/5 gram) Gel 1 Applicatorful, Vaginal, Nightly    norelgestromin-ethinyl estradiol 150-35 mcg/24 hr 1 patch, Transdermal, Every 7 days       Diagnostic studies:      MRI Brain Without Contrast   Final Result      CTA Chest Non-Coronary (PE Studies)   Final Result      1.  Limited exam with extensive artifacts caused by patient motion and suboptimal contrast opacification of the pulmonary arterial system.  No gross evidence of main pulmonary arteries and proximal main branches thromboembolism.      2.  No acute infiltrates or effusions.         Electronically signed by: Magdy Branch   Date:    04/04/2025   Time:    13:02      CT Head Without Contrast   Final Result      No acute intracranial findings.         Electronically signed by: Ernesto Rodriguez   Date:    04/04/2025   Time:    12:20      X-Ray Chest AP Portable   Final Result      No acute cardiopulmonary process identified.         Electronically signed by: Magdy Branch   Date:    04/04/2025   Time:    11:20      FL LUMBAR PUNCTURE DIAGNOSTIC WITH IMAGING    (Results Pending)         4/6/2025 3:42 PM      The patient location is: [facility]  Total time spent with patient: 75    The attending portion of this evaluation, treatment, and documentation was performed per Shamika Owusu MD via Telemedicine AudioVisual using the secure [employer] software platform with 2 way audio/video. The provider was located off-site and the patient is located in the hospital. The aforementioned video software was utilized to document the relevant history and physical exam.      Critical care time spent: >75 minutes

## 2025-04-06 NOTE — CONSULTS
Inpatient consult to Cardiology  Consult performed by: Jeffrey Perez ANP  Consult ordered by: Ama Esparza MD  Reason for consult: Syncope        [facility]    Cardiology  Consult Note    Patient Name: Yesi Porter  MRN: [id number]  Admission Date: 2025  Hospital Length of Stay: 2 days  Code Status: Full Code   Attending Provider: Ama Esparza MD   Consulting Provider: CHRISTIAN Russell  Primary Care Physician: Mitzi Dow FNP  Principal Problem:<principal problem not specified>    Patient information was obtained from patient, past medical records, and ER records.     Subjective:     Chief Complaint/Reason for Consult: Syncope     HPI: Ms. Porter is a 30 y/o female who is unknown to [facility]. The patient presented to [facility] on 25 with AMS. The patient allegedly develop a headache that was associated with nausea on the day prior to the AMS. She was evaluated by Urgent Care and about 15 mins after being placed in an Exam Room she became unresponsive, nonverbal and urinated on herself. EMS was notified and she was transferred to the ER. Upon arrival to the ER she was found to be Tachycardic with a HR in the 120s. Initial Workup revealed: WBC 19.88, H&H 11.5/26.4, D-Dimer 0.65, Mg 1.5, Troponin < 0.01, Lactic Acid 3.4, UDS Negative, CT Head Negative, CXR Negative, CTA Chest without Evidence of PE. She was admitted to  and Neurology was consulted. MRI of the Brain was Negative. [facility] was consulted for Syncope, however, no clear documentation or report of syncopal event, only AMS.    PMH: Depression  PSH: Abscess Drainage,    Family History: Mother, L, AF; Father, L, Healthy  Social History: Denies Illicit Drug, ETOH and Tobacco Use     Previous Cardiac Diagnostics:   Carotid US 25:  The right internal carotid artery is patent with no evidence of stenosis.  The left internal carotid artery is patent with no evidence of stenosis.  Bilateral vertebral  arteries are patent with antegrade flow.    ECHO 4.4.25:  Left Ventricle: The left ventricle is normal in size. Normal wall thickness. There is normal systolic function with a visually estimated ejection fraction of 60 - 65%. There is normal diastolic function.  Right Ventricle: Systolic function is normal.  Left Atrium: Agitated saline study of the atrial septum is late positive, consistent with intrapulmonary shunt rather than intra-cardiac origin.  There are no significant valvular abnormalities.  Pericardium: There is no pericardial effusion.    Review of patient's allergies indicates:  No Known Allergies  No current facility-administered medications on file prior to encounter.     Current Outpatient Medications on File Prior to Encounter   Medication Sig    hydrOXYzine pamoate (VISTARIL) 25 MG Cap Take 1 capsule (25 mg total) by mouth every evening.    metroNIDAZOLE (NUVESSA) 1.3 % (65 mg/5 gram) Gel Place 1 Applicatorful vaginally every evening.    norelgestromin-ethinyl estradiol 150-35 mcg/24 hr Place 1 patch onto the skin every 7 days.     Review of Systems   Constitutional:  Positive for fatigue.   Respiratory:  Negative for shortness of breath.    Cardiovascular:  Negative for chest pain, palpitations and leg swelling.   Neurological:  Negative for dizziness, seizures, syncope and weakness.   All other systems reviewed and are negative.    Objective:     Vital Signs (Most Recent):  Temp: 98.3 °F (36.8 °C) (04/06/25 0554)  Pulse: 98 (04/06/25 0338)  Resp: 18 (04/06/25 0338)  BP: 90/61 (04/06/25 0338)  SpO2: 97 % (04/06/25 0338) Vital Signs (24h Range):  Temp:  [98 °F (36.7 °C)-100.1 °F (37.8 °C)] 98.3 °F (36.8 °C)  Pulse:  [] 98  Resp:  [18] 18  SpO2:  [96 %-100 %] 97 %  BP: ()/(58-77) 90/61   Weight: 78.9 kg (174 lb)  Body mass index is 29.87 kg/m².  SpO2: 97 %       Intake/Output Summary (Last 24 hours) at 4/6/2025 0652  Last data filed at 4/6/2025 0340  Gross per 24 hour   Intake 240 ml    Output 0 ml   Net 240 ml     Lines/Drains/Airways       Peripheral Intravenous Line  Duration                  Peripheral IV - Single Lumen 04/05/25 0348 20 G Anterior;Left Forearm 1 day                  Significant Labs:   Chemistries:   Recent Labs   Lab 04/04/25  1052 04/05/25  0635    138   K 3.9 4.0    109*   CO2 14* 18*   BUN 9.5 7.6   CREATININE 0.78 0.67   CALCIUM 9.5 8.1*   BILITOT 0.5 0.3   ALKPHOS 64 53   ALT 14 11   AST 21 13   GLUCOSE 110* 99   MG 1.50* 2.20   PHOS  --  3.4   TROPONINI <0.010 <0.010        CBC/Anemia Labs: Coags:    Recent Labs   Lab 04/04/25  1052 04/04/25  1512 04/05/25  0635   WBC 19.88*  --  17.77*   HGB 11.5*  --  10.5*   HCT 36.4*  --  34.1*     --  267   MCV 82.4  --  85.0   RDW 13.4  --  13.9   IRON 44*  --   --    FERRITIN 26.60  --   --    FOLATE  --  9.8  --    ODAZPVXT83 821*  --   --     Recent Labs   Lab 04/04/25  1052 04/05/25  0635   INR 1.0 1.1   APTT 27.1 29.2        Significant Imaging:  Imaging Results              MRI Brain Without Contrast (Final result)  Result time 04/04/25 19:53:52      Final result by Walker Silva MD (04/04/25 19:53:52)                   Narrative:    EXAMINATION  MRI BRAIN WITHOUT CONTRAST    CLINICAL HISTORY  Stroke, follow up;    TECHNIQUE  Multiplanar, multisequence MR images were obtained without the intravenous administration of gadolinium-based contrast media.    COMPARISON  4 April 2025 non-contrast head CT    FINDINGS  Exam quality: Limited secondary to patient movement throughout image acquisition, with resulting artifact.    Parenchyma: No restricted diffusion or other suggestion of acute ischemic insult.  No convincing evidence of intraparenchymal hemorrhage.  Differentiation of the gray-white border is grossly preserved.  No discrete mass, localized mass effect, or midline shift appreciated.    CSF spaces: No evidence of extra-axial blood, expansile collection, or mass-like focal findings.  Normal size and  configuration of the ventricles.  The basal cisterns are preserved.  Sulcal volume appears normal for patient age.    Sella/Suprasellar structures: No abnormalities.    Vasculature: Normal flow signal voids within the large intracranial arteries.  No focal abnormality of the dural sinuses.    Other findings: No abnormalities of the skull or scalp.  Mastoid air cells are well aerated.  Facial cavities are clear, with no fluid level.    IMPRESSION  1. Motion degraded exam secondary to patient head movement during acquisition.  2. Within limitations, no convincing acute or focal intracranial abnormality.      Electronically signed by: Walker Silva  Date:    04/04/2025  Time:    19:53                                     CTA Chest Non-Coronary (PE Studies) (Final result)  Result time 04/04/25 13:02:43      Final result by Magdy Branch MD (04/04/25 13:02:43)                   Impression:      1.  Limited exam with extensive artifacts caused by patient motion and suboptimal contrast opacification of the pulmonary arterial system.  No gross evidence of main pulmonary arteries and proximal main branches thromboembolism.    2.  No acute infiltrates or effusions.      Electronically signed by: Magdy Branch  Date:    04/04/2025  Time:    13:02               Narrative:    EXAMINATION:  CTA CHEST NON CORONARY (PE STUDIES)    CLINICAL HISTORY:  Pulmonary embolism (PE) suspected, positive D-dimer;    TECHNIQUE:  Sequential axial images performed of the chest using pulmonary embolism protocol following intravenous contrast bolus. Sagittal and contrast reformations performed.  MIP images are also performed.    Dose product length of 218 mGycm. Automated exposure control was utilized to minimize radiation dose.    COMPARISON:  Chest radiograph April 4, 2025    FINDINGS:  Suboptimal exam with upper thoracic images degraded by artifacts caused by the patient motion.  There is also suboptimal contrast bolus timing with limited  contrast opacification of the pulmonary arterial system.  No gross evidence of main pulmonary arteries or proximal pulmonary arteries branches filling defects to suggest pulmonary thromboembolism.  Aortic arch assessment is nondiagnostic.  Otherwise, the thoracic aorta is without aneurysmal dilatation or dissection.  Cardiac chamber size is within normal limits.  No pericardial effusion.    Limited assessment of the upper lungs.  Otherwise, as visualized no pulmonary edema or consolidation.  No fluid within the pleural and the pericardial spaces.                                       CT Head Without Contrast (Final result)  Result time 04/04/25 12:20:22      Final result by Ernesto Rodriguez MD (04/04/25 12:20:22)                   Impression:      No acute intracranial findings.      Electronically signed by: Ernesto Rodriguez  Date:    04/04/2025  Time:    12:20               Narrative:    EXAMINATION:  CT HEAD WITHOUT CONTRAST    CLINICAL HISTORY:  Mental status change, unknown cause;    TECHNIQUE:  CT imaging of the head performed from the skull base to the vertex without intravenous contrast.  DLP 2078 mGycm. Automatic exposure control, adjustment of mA/kV or iterative reconstruction technique was used to reduce radiation.    COMPARISON:  None Available.    FINDINGS:  There is no acute cortical infarct, hemorrhage or mass lesion.  The ventricles are normal in size.    No significant paranasal sinus inflammation.  Mastoid air cells are clear.                                       X-Ray Chest AP Portable (Final result)  Result time 04/04/25 11:20:16      Final result by Magdy Branch MD (04/04/25 11:20:16)                   Impression:      No acute cardiopulmonary process identified.      Electronically signed by: Magdy Branch  Date:    04/04/2025  Time:    11:20               Narrative:    EXAMINATION:  XR CHEST AP PORTABLE    CLINICAL HISTORY:  AMS of unknown etiology;    TECHNIQUE:  One  view    COMPARISON:  January 14, 2024.    FINDINGS:  Cardiopericardial silhouette is within normal limits.  No acute dense focal or segmental consolidation, congestive process, pleural effusions or pneumothorax.                                    EKG:       Telemetry: ST    Physical Exam  Constitutional:       Appearance: Normal appearance.   HENT:      Head: Normocephalic.      Mouth/Throat:      Mouth: Mucous membranes are moist.   Eyes:      Extraocular Movements: Extraocular movements intact.   Cardiovascular:      Rate and Rhythm: Normal rate and regular rhythm.      Pulses: Normal pulses.   Pulmonary:      Effort: Pulmonary effort is normal.      Breath sounds: Normal breath sounds.   Abdominal:      Palpations: Abdomen is soft.   Musculoskeletal:         General: No swelling. Normal range of motion.   Skin:     General: Skin is warm and dry.   Neurological:      General: No focal deficit present.      Mental Status: She is alert and oriented to person, place, and time.   Psychiatric:         Mood and Affect: Mood normal.         Behavior: Behavior normal.       Home Medications:   Medications Ordered Prior to Encounter[1]  Current Schedule Inpatient Medications:   docusate sodium  200 mg Oral BID     Continuous Infusions:    Assessment:   AMS - Unknown Etiology    - PT Became Unresponsive, No Clear Syncopal Event  Abnormal ECHO    - ECHO (4.4.25) - LVEF 60-65%; Left Atrium: Agitated saline study of the atrial septum is late positive, consistent with intrapulmonary shunt rather than intra-cardiac origin.   Leukocytosis  Headache - Resolved  Anemia  Metabolic Acidosis - Resolved  Electrolyte Derangements - Hypomagnesemia  MO  No Hx of GIB     Plan:   Defer AMS Workup to Primary/Neurological Team  Plan for 2 Week MCT for Diagnosis of AMS  Consider OP HAYLEY to Evaluate Intracardiac Shunt - Defer to Structural Heart Team   F/U with [facility] in 1-2 Weeks  We will be available as needed    Thank you for your consult.      Jeffrey Perez, ANP  Cardiology  [facility]     Physician Addendum:    Patient's cardiac care is performed as a split-shared visit with DOTTIE d/t complicated medical management as detailed in A/P and associated high acuity requiring physician expertise. I obtained and performed relevant components of history/exam. Medical decision-making is formulated by me. It is a pleasure to care for the patient.    Impression/Plan:  AMS  HA    Outpatient home monitor for 2 weeks and f/u w/ Dr. Cortez as outpatient        Rodger Canales MD  [facility] Cardiology Team           [1]   No current facility-administered medications on file prior to encounter.     Current Outpatient Medications on File Prior to Encounter   Medication Sig Dispense Refill    hydrOXYzine pamoate (VISTARIL) 25 MG Cap Take 1 capsule (25 mg total) by mouth every evening. 30 capsule 6    metroNIDAZOLE (NUVESSA) 1.3 % (65 mg/5 gram) Gel Place 1 Applicatorful vaginally every evening. 5 g 0    norelgestromin-ethinyl estradiol 150-35 mcg/24 hr Place 1 patch onto the skin every 7 days. 4 patch 11

## 2025-04-06 NOTE — PROGRESS NOTES
[facility] Hospital Medicine Progress Note        Chief Complaint: Inpatient Follow-up for AMS    HPI:   28 yo female with a PMHx of anxiety/depression presented to [facility] via EMS on 4/4/2025 with c/o altered mental status that began on the morning of presentation.  Pt was unable to give any history due to being altered. Initial history obtained from pt's mother who reported Pt developed generalized moderate intensity headache associated with nausea, some light intolerance and extreme fatigue previous night. Denies neck pain or neck stiffness. Symptoms persisted and therefore Pt went to an urgent care clinic around 9:00 am and about 15 minutes after being placed in the exam room,  she became altered with decreased responsiveness, nonverbal and urinary incontinence.  EMS reported patient remained nonverbal, but was moving all extremities and maintaining her airway. No history of seizure disorder in the past.     On arrival to the ED, Pt was afebrile but tachycardic with , bradypnea with RR 8, /81 and O2sat 100% on RA. Labs showed leukocytosis 19.8K, Hgb 11.5, Na 136, K 3.9, Bicarb 14, Cr 0.7, Mg 1.5, CRP 20, , Neg troponin, neg preg test, neg UDS, neg blood alcohol, neg flu/covid/RSV, neg Resp PCR panel, UA without infection, lactic 3.4 then repeat normal. CT head without contrast negative for acute intracranial findings.  CXR negative for acute cardiopulmonary process. CTA chest degraded by patient motion and suboptimal contrast opacification of the pulmonary arterial system but no gross evidence of main pulmonary arteries and proximal main branch thromboembolism, no acute infiltrates or effusions.  Pt was given IVF in the ED and admitted to  service. Neurology was consulted. MRI brain, EEG and LP ordered. LP is not available until Monday 4/7/25.       Interval Hx:   Pt was seen at bedside with mother in the room.  Pt is currently fully awake, alert, oriented  however does not recall the event. Pt states she remember going to urgent care and next thing she remember waking up in the CT machine. Stil has some frontal headaches. Denies neck pain or rigidity.     Tmax 100 noted today , otherwise vitals are stable , on RA  Labs today WBC 17K, bicarb 18, CRP 92, Procalcitonin 0.08 suggestive of low suspicion for bacterial infection     Case was discussed with patient's nurse and  on the floor.    Objective/physical exam:  General: In no acute distress, afebrile  Chest: Clear to auscultation bilaterally  Heart: RRR, +S1, S2, no appreciable murmur  Abdomen: Soft, nontender, BS +  MSK: Warm, no lower extremity edema, no clubbing or cyanosis  Neurologic: Alert and oriented x4, Cranial nerve II-XII intact, Strength 5/5 in all 4 extremities    VITAL SIGNS: 24 HRS MIN & MAX LAST   Temp  Min: 97 °F (36.1 °C)  Max: 100.1 °F (37.8 °C) 98 °F (36.7 °C)   BP  Min: 92/56  Max: 118/77 104/73   Pulse  Min: 91  Max: 105  105   Resp  Min: 18  Max: 18 18   SpO2  Min: 96 %  Max: 100 % 96 %     I have reviewed the following labs:  Recent Labs   Lab 04/04/25  1052 04/05/25  0635   WBC 19.88* 17.77*   RBC 4.42 4.01*   HGB 11.5* 10.5*   HCT 36.4* 34.1*   MCV 82.4 85.0   MCH 26.0* 26.2*   MCHC 31.6* 30.8*   RDW 13.4 13.9    267   MPV 10.5* 10.3     Recent Labs   Lab 04/04/25  1052 04/05/25  0635    138   K 3.9 4.0    109*   CO2 14* 18*   BUN 9.5 7.6   CREATININE 0.78 0.67   CALCIUM 9.5 8.1*   MG 1.50* 2.20   ALBUMIN 4.0 3.4*   ALKPHOS 64 53   ALT 14 11   AST 21 13   BILITOT 0.5 0.3     Microbiology Results (last 7 days)       Procedure Component Value Units Date/Time    Blood culture #1 **CANNOT BE ORDERED STAT** [[id number]]  (Normal) Collected: 04/04/25 1122    Order Status: Completed Specimen: Blood Updated: 04/05/25 1402     Blood Culture No Growth At 24 Hours    Blood culture #2 **CANNOT BE ORDERED STAT** [[id number]]  (Normal) Collected: 04/04/25 1123    Order  Status: Completed Specimen: Blood Updated: 04/05/25 1301     Blood Culture No Growth At 24 Hours    Cryptococcal antigen, CSF [[id number]]     Order Status: Sent Specimen: CSF (Spinal Fluid) from CSF Tap, Tube 2     Cerebrospinal Fluid Culture [[id number]]     Order Status: Sent Specimen: CSF (Spinal Fluid) from CSF Tap, Tube 1     AFB Smear [[id number]]     Order Status: Sent Specimen: CSF (Spinal Fluid) from Cerebrospinal Fluid     Mycobacteria and Nocardia Culture [[id number]]     Order Status: Sent Specimen: CSF (Spinal Fluid) from Cerebrospinal Fluid              See below for Radiology    Assessment/Plan:  Acute encephalopathy, unspecified type  due to Syncope vs Seizure episode vs other etiology, POA  Moderate leukocytosis, POA- infection vs reactive. No infectious etiology identified   Headache, POA  Normocytic anemia / anemia of chronic disease   Metabolic acidosis /Lactic acidosis , POA- improved   Hypomagnesemia, moderate -POA  Elevated inflammatory markers, POA- rising   Obesity, BMI 29.8    Hx-   Pt's mental status now pretty much back to normal without specific intervention, however still having mild headache but no further confusion reported.   Low suspicion for bacterial infection   Viral etiology still possible   MRI brain without acute findings   Await Neuro input, EEG and LP      VTE prophylaxis: [facility]    Patient condition:  Fair    Anticipated discharge and Disposition:     Home with family     All diagnosis and differential diagnosis have been reviewed; assessment and plan has been documented; I have personally reviewed the labs and test results that are presently available; I have reviewed the patients medication list; I have reviewed the consulting providers response and recommendations. I have reviewed or attempted to review medical records based upon their availability    All of the patient's questions have been  addressed and answered. Patient's is agreeable to the above stated plan. I will  continue to monitor closely and make adjustments to medical management as needed.    Portions of this note dictated using EMR integrated voice recognition software, and may be subject to voice recognition errors not corrected at proofreading. Please contact writer for clarification if needed.   _____________________________________________________________________    Malnutrition Status:  Nutrition consulted. Most recent weight and BMI monitored-     Measurements:  Wt Readings from Last 1 Encounters:   04/05/25 78.9 kg (174 lb)   Body mass index is 29.87 kg/m².    Patient has been screened and assessed by RD.    Malnutrition Type:  Context:    Level:      Malnutrition Characteristic Summary:       Interventions/Recommendations (treatment strategy):        Scheduled Med:   docusate sodium  200 mg Oral BID      Continuous Infusions:     PRN Meds:    Current Facility-Administered Medications:     acetaminophen, 1,000 mg, Oral, Q6H PRN    bisacodyL, 10 mg, Rectal, Daily PRN    butalbital-acetaminophen-caffeine -40 mg, 1 tablet, Oral, Q8H PRN    labetalol, 10 mg, Intravenous, Q6H PRN    lorazepam, 2 mg, Intravenous, Q4H PRN    ondansetron, 4 mg, Intravenous, Q6H PRN         Ama Esparza MD  Department of Hospital Medicine   [facility]   04/05/2025

## 2025-04-06 NOTE — PROGRESS NOTES
[facility] - 4th Floor Medical Telemetry  Neurology  Progress Note    Patient Name: Yesi Porter  MRN: [id number]  Admission Date: 4/4/2025  Hospital Length of Stay: 2 days  Code Status: Full Code   Attending Provider: Ama Esparza MD  Primary Care Physician: Mitzi Dow FNP   Principal Problem:<principal problem not specified>    HPI:   29 y.o. female with a PMHx of anxiety/depression who presented to [facility] via EMS on 4/4/2025 with c/o altered mental status that began on the morning of presentation.  Patient initially presented to an [facility] urgent care around 9:00 a.m. She was on the phone with her mother, who noted she was not responding 15 minutes after being at urgent care. Patient does not recall events. She was awake but not responsive for several hours.  No seizure activity was noted. She is now with normal mentation without any complaints. Denies recent illness. Did have a frontal headache prior to symptoms occurring and still has HA. It is mild but present. Denies vision issues. NO hx of migraine    Overview/Hospital Course:  No notes on file        Subjective:     Interval History: no further events. Headache is still present. EEG is normal        Current Medications[1]    Review of Systems  Objective:     Vital Signs (Most Recent):  Temp: 99.1 °F (37.3 °C) (04/06/25 1120)  Pulse: 92 (04/06/25 1120)  Resp: 18 (04/06/25 1120)  BP: 111/78 (04/06/25 1120)  SpO2: 100 % (04/06/25 1120) Vital Signs (24h Range):  Temp:  [98 °F (36.7 °C)-100.1 °F (37.8 °C)] 99.1 °F (37.3 °C)  Pulse:  [] 92  Resp:  [18] 18  SpO2:  [96 %-100 %] 100 %  BP: ()/(58-78) 111/78     Weight: 78.9 kg (174 lb)  Body mass index is 29.87 kg/m².     Physical Exam        NAD  Alert and oriented  Cognition and perception intact  No aphasia  EOMI  No facial asymmetry  No dysarthria  Moves all extremities symmetrically  No gross coordination abnormalities  Gait normal   Significant Labs:   Recent Lab Results          04/06/25  0755   04/05/25  1705   04/05/25  1651        Human Rhinovirus/Enterovirus   Not Detected         Parainfluenza Virus 1   Not Detected         Parainfluenza Virus 2   Not Detected         Parainfluenza Virus 3   Not Detected         Parainfluenza Virus 4   Not Detected         Procalcitonin     0.08  Comment: Procalcitonin (PCT) is intended for use as an:  -  Aid in the risk assessment of critically ill patients on their first day of ICU admission for progression to severe sepsis and septic shock, with levels as follows:  PCT level < 0.5 ng/mL: low risk and PCT > 2.0 ng/mL:  high risk.  Recommend PCT re-test in 6 to 24 hours if any values < 2 ng/mL are obtained.    -  Aid in decision making on antibiotic therapy for patients with suspected or             confirmed lower respiratory tract infections.    - Aid in assessing the cumulative 28-day risk of all-cause mortality for patients              diagnosed with severe sepsis or septic shock.    - Aid in decision making on antibiotic discontinuation for patients with suspected or     confirmed sepsis.    Procalcitonin (PCT) is not indicated to be used as a stand-alone diagnostic test and should be used in conjunction with clinical signs and symptoms of infection and other diagnostic evidence.    Decisions regarding antibiotic therapy should NOT be based solely on PCT concentrations.    Low PCT levels do not exclude bacterial infection and may occur in:  Early infection, localized infection, subacute infectious endocarditis, and infection with certain atypical pathogens, such as Chlamydophila pneumoniae and Mycoplasma pneumoniae.    Increased PCT levels may not always be related to systemic bacterial infection and may be elevated by non-bacterial causes, including, but not limited to:  Neonates at < 48 hours of life, severe illness (polytrauma, burns, major surgery, prolonged or cardiogenic shock), invasive fungal infections, acute attacks of  Plasmodium falciparum malaria, peritoneal dialysis or hemodialysis treatment, biliary pancreatitis, chemical pneumonitis, heat stroke, severe liver cirrhosis, acute and chronic viral hepatitis, small cell lung cancer, medullary C-cell thyroid carcinoma, and treatment with muromonab-CD3 antibodies and other drugs that stimulate release of pro-inflammatory cytokines.    PCT levels may be influenced by renal insufficiency or renal failure and these should be considered as potentially confounding clinical factors when interpreting PCT values.                    Albumin/Globulin Ratio 1.1           ADENOVIRUS   Not Detected         Albumin 3.7           ALP 67           ALT 10           Anion Gap 12.0           AST 13           Baso # 0.05           Basophil % 0.3           BILIRUBIN TOTAL 0.3           BORDETELLA PARAPERTUSSIS ([id number])   Not Detected         Bordetella pertussis (ptxP)   Not Detected         BUN 5.9           BUN/CREAT RATIO 9           Calcium 8.5           CHLAMYDIA PNEUMONIAE   Not Detected         Chloride 106           CO2 20           Coronavirus 229E   Not Detected         Coronavirus HKU1   Not Detected         Coronavirus NL63   Not Detected         Coronavirus OC43 PCR, Common Cold Virus   Not Detected         Creatinine 0.69           .40           eGFR >60  Comment: Estimated GFR calculated using the CKD-EPI creatinine (2021) equation.           Eos # 0.04           Eos % 0.2           Globulin, Total 3.5           Glucose 71           Hematocrit 34.4           Hemoglobin 10.9           Human Metapneumovirus   Not Detected         Immature Grans (Abs) 0.09           Immature Granulocytes 0.5           Lymph # 1.71           LYMPH % 8.7           Magnesium  2.10           MCH 26.1           MCHC 31.7           MCV 82.3           Mono # 1.30           Mono % 6.6           MPV 11.1           MYCOPLASMA PNEUMONIAE   Not Detected         Neut # 16.57           Neut % 83.7           nRBC  0.0           Phosphorus Level 2.9           Platelet Count 303           Potassium 4.0           PROTEIN TOTAL 7.2           RBC 4.18           RDW 13.7           Sodium 138           WBC 19.76                   Significant Imaging:  MRI negative    Assessment and Plan:     Encephalopathy acute  Resolved now. Unclear etiology, discussed diagnostic possibilities--seizure, confusional migraine, syncope. EEG normal.  LP is ordered for tomorrow    Treat HA with migraine cocktail;    VTE Risk Mitigation (From admission, onward)           Ordered     IP VTE HIGH RISK PATIENT  Once         04/04/25 1425     Place sequential compression device  Until discontinued         04/04/25 1425                    Ny Cummings MD  Neurology  [facility] - 4th Floor Medical Telemetry       [1]   Current Facility-Administered Medications   Medication Dose Route Frequency Provider Last Rate Last Admin    acetaminophen tablet 1,000 mg  1,000 mg Oral Q6H PRN Sarah Swain, FNP   1,000 mg at 04/06/25 0554    bisacodyL suppository 10 mg  10 mg Rectal Daily PRN Sherice Bosch AGACNP-BC        butalbital-acetaminophen-caffeine -40 mg per tablet 1 tablet  1 tablet Oral Q8H PRN Sarah Swain FNP   1 tablet at 04/05/25 2158    cetirizine tablet 10 mg  10 mg Oral Daily PRN Sarah Swain FNP   10 mg at 04/06/25 0554    docusate sodium capsule 200 mg  200 mg Oral BID Ama Esparza MD   200 mg at 04/06/25 1054    labetaloL injection 10 mg  10 mg Intravenous Q6H PRN Sherice Bosch AGACNP-BC        LORazepam injection 2 mg  2 mg Intravenous Q4H PRN Sherice Bosch AGACNP-BC        ondansetron injection 4 mg  4 mg Intravenous Q6H PRN Ama Esparza MD   4 mg at 04/06/25 0629

## 2025-04-06 NOTE — SUBJECTIVE & OBJECTIVE
Subjective:     Interval History: no further events. Headache is still present. EEG is normal        Current Medications[1]    Review of Systems  Objective:     Vital Signs (Most Recent):  Temp: 99.1 °F (37.3 °C) (04/06/25 1120)  Pulse: 92 (04/06/25 1120)  Resp: 18 (04/06/25 1120)  BP: 111/78 (04/06/25 1120)  SpO2: 100 % (04/06/25 1120) Vital Signs (24h Range):  Temp:  [98 °F (36.7 °C)-100.1 °F (37.8 °C)] 99.1 °F (37.3 °C)  Pulse:  [] 92  Resp:  [18] 18  SpO2:  [96 %-100 %] 100 %  BP: ()/(58-78) 111/78     Weight: 78.9 kg (174 lb)  Body mass index is 29.87 kg/m².     Physical Exam        NAD  Alert and oriented  Cognition and perception intact  No aphasia  EOMI  No facial asymmetry  No dysarthria  Moves all extremities symmetrically  No gross coordination abnormalities  Gait normal   Significant Labs:   Recent Lab Results         04/06/25  0755   04/05/25  1705   04/05/25  1651        Human Rhinovirus/Enterovirus   Not Detected         Parainfluenza Virus 1   Not Detected         Parainfluenza Virus 2   Not Detected         Parainfluenza Virus 3   Not Detected         Parainfluenza Virus 4   Not Detected         Procalcitonin     0.08  Comment: Procalcitonin (PCT) is intended for use as an:  -  Aid in the risk assessment of critically ill patients on their first day of ICU admission for progression to severe sepsis and septic shock, with levels as follows:  PCT level < 0.5 ng/mL: low risk and PCT > 2.0 ng/mL:  high risk.  Recommend PCT re-test in 6 to 24 hours if any values < 2 ng/mL are obtained.    -  Aid in decision making on antibiotic therapy for patients with suspected or             confirmed lower respiratory tract infections.    - Aid in assessing the cumulative 28-day risk of all-cause mortality for patients              diagnosed with severe sepsis or septic shock.    - Aid in decision making on antibiotic discontinuation for patients with suspected or     confirmed sepsis.    Procalcitonin  (PCT) is not indicated to be used as a stand-alone diagnostic test and should be used in conjunction with clinical signs and symptoms of infection and other diagnostic evidence.    Decisions regarding antibiotic therapy should NOT be based solely on PCT concentrations.    Low PCT levels do not exclude bacterial infection and may occur in:  Early infection, localized infection, subacute infectious endocarditis, and infection with certain atypical pathogens, such as Chlamydophila pneumoniae and Mycoplasma pneumoniae.    Increased PCT levels may not always be related to systemic bacterial infection and may be elevated by non-bacterial causes, including, but not limited to:  Neonates at < 48 hours of life, severe illness (polytrauma, burns, major surgery, prolonged or cardiogenic shock), invasive fungal infections, acute attacks of Plasmodium falciparum malaria, peritoneal dialysis or hemodialysis treatment, biliary pancreatitis, chemical pneumonitis, heat stroke, severe liver cirrhosis, acute and chronic viral hepatitis, small cell lung cancer, medullary C-cell thyroid carcinoma, and treatment with muromonab-CD3 antibodies and other drugs that stimulate release of pro-inflammatory cytokines.    PCT levels may be influenced by renal insufficiency or renal failure and these should be considered as potentially confounding clinical factors when interpreting PCT values.                    Albumin/Globulin Ratio 1.1           ADENOVIRUS   Not Detected         Albumin 3.7           ALP 67           ALT 10           Anion Gap 12.0           AST 13           Baso # 0.05           Basophil % 0.3           BILIRUBIN TOTAL 0.3           BORDETELLA PARAPERTUSSIS ([id number])   Not Detected         Bordetella pertussis (ptxP)   Not Detected         BUN 5.9           BUN/CREAT RATIO 9           Calcium 8.5           CHLAMYDIA PNEUMONIAE   Not Detected         Chloride 106           CO2 20           Coronavirus 229E   Not Detected          Coronavirus HKU1   Not Detected         Coronavirus NL63   Not Detected         Coronavirus OC43 PCR, Common Cold Virus   Not Detected         Creatinine 0.69           .40           eGFR >60  Comment: Estimated GFR calculated using the CKD-EPI creatinine (2021) equation.           Eos # 0.04           Eos % 0.2           Globulin, Total 3.5           Glucose 71           Hematocrit 34.4           Hemoglobin 10.9           Human Metapneumovirus   Not Detected         Immature Grans (Abs) 0.09           Immature Granulocytes 0.5           Lymph # 1.71           LYMPH % 8.7           Magnesium  2.10           MCH 26.1           MCHC 31.7           MCV 82.3           Mono # 1.30           Mono % 6.6           MPV 11.1           MYCOPLASMA PNEUMONIAE   Not Detected         Neut # 16.57           Neut % 83.7           nRBC 0.0           Phosphorus Level 2.9           Platelet Count 303           Potassium 4.0           PROTEIN TOTAL 7.2           RBC 4.18           RDW 13.7           Sodium 138           WBC 19.76                   Significant Imaging:  MRI negative       [1]   Current Facility-Administered Medications   Medication Dose Route Frequency Provider Last Rate Last Admin    acetaminophen tablet 1,000 mg  1,000 mg Oral Q6H PRN Sarah Swain FNP   1,000 mg at 04/06/25 0554    bisacodyL suppository 10 mg  10 mg Rectal Daily PRN Sherice Bosch AGACNP-BC        butalbital-acetaminophen-caffeine -40 mg per tablet 1 tablet  1 tablet Oral Q8H PRN Sarah Swain FNP   1 tablet at 04/05/25 2158    cetirizine tablet 10 mg  10 mg Oral Daily PRN Sarah Swain FNP   10 mg at 04/06/25 0554    docusate sodium capsule 200 mg  200 mg Oral BID Ama Esparza MD   200 mg at 04/06/25 1054    labetaloL injection 10 mg  10 mg Intravenous Q6H PRN Sherice Bosch AGACNP-BC        LORazepam injection 2 mg  2 mg Intravenous Q4H PRN Sherice Bosch AGACNP-BC        ondansetron injection 4 mg  4 mg  Intravenous Q6H PRN Ama Esparza MD   4 mg at 04/06/25 0615

## 2025-04-06 NOTE — ASSESSMENT & PLAN NOTE
Resolved now. Unclear etiology, discussed diagnostic possibilities--seizure, confusional migraine, syncope. EEG normal.  LP is ordered for tomorrow

## 2025-04-06 NOTE — PROCEDURES
"Yesi Porter is a 29 y.o. female patient.    Temp: 98 °F (36.7 °C) (04/05/25 1939)  Pulse: 105 (04/05/25 1939)  Resp: 18 (04/05/25 1939)  BP: 104/73 (04/05/25 1939)  SpO2: 96 % (04/05/25 1939)  Weight: 78.9 kg (174 lb) (04/05/25 0403)  Height: 5' 4" (162.6 cm) (04/05/25 0403)       EEG    Date/Time: 4/5/2025 8:38 PM    Performed by: Ny Cummings MD  Authorized by: Ama Esparza MD          4/5/2025    Reason for exam: seizure      Technical description:  A standard digital EEG was performed at [facility].  The 10 20 international system of electrode placement is used.  Standard montages were recorded.  Activation procedures were performed.    Description:  The record was dominated by a 9  hertz posterior dominant rhythm which attenuated with eye opening activity.  During drowsiness, identified by ocular signs and alpha attenuation, there is diffuse asynchronous theta activity.  Stage 2 sleep was identified by vertex waves, sleep spindles, and K complexes.  Photic stimulation elicited no abnormalities.  There were no epileptiform discharges or electrographic seizures    Impression:  This is a normal awake and asleep EEG.   "

## 2025-04-07 VITALS
OXYGEN SATURATION: 98 % | RESPIRATION RATE: 18 BRPM | HEART RATE: 81 BPM | TEMPERATURE: 99 F | BODY MASS INDEX: 29.71 KG/M2 | SYSTOLIC BLOOD PRESSURE: 110 MMHG | WEIGHT: 174 LBS | DIASTOLIC BLOOD PRESSURE: 61 MMHG | HEIGHT: 64 IN

## 2025-04-07 PROBLEM — G93.41 ENCEPHALOPATHY, METABOLIC: Status: ACTIVE | Noted: 2025-04-07

## 2025-04-07 LAB
APPEARANCE CSF: CLEAR
BASOPHILS # BLD AUTO: 0.05 X10(3)/MCL
BASOPHILS NFR BLD AUTO: 0.3 %
C GATTII+NEOFOR DNA CSF QL NAA+NON-PROBE: NOT DETECTED
CMV DNA CSF QL NAA+NON-PROBE: NOT DETECTED
COLOR CSF: COLORLESS
CRP SERPL-MCNC: 96.4 MG/L
E COLI K1 DNA CSF QL NAA+NON-PROBE: NOT DETECTED
EOSINOPHIL # BLD AUTO: 0.36 X10(3)/MCL (ref 0–0.9)
EOSINOPHIL NFR BLD AUTO: 2.4 %
ERYTHROCYTE [DISTWIDTH] IN BLOOD BY AUTOMATED COUNT: 13.6 % (ref 11.5–17)
EV RNA CSF QL NAA+NON-PROBE: NOT DETECTED
GLUCOSE CSF-MCNC: 59 MG/DL (ref 40–70)
GP B STREP DNA CSF QL NAA+NON-PROBE: NOT DETECTED
HAEM INFLU DNA CSF QL NAA+NON-PROBE: NOT DETECTED
HCT VFR BLD AUTO: 35 % (ref 37–47)
HGB BLD-MCNC: 10.9 G/DL (ref 12–16)
HHV6 DNA CSF QL NAA+NON-PROBE: NOT DETECTED
HSV1 DNA CSF QL NAA+NON-PROBE: NOT DETECTED
HSV2 DNA CSF QL NAA+NON-PROBE: NOT DETECTED
IMM GRANULOCYTES # BLD AUTO: 0.06 X10(3)/MCL (ref 0–0.04)
IMM GRANULOCYTES NFR BLD AUTO: 0.4 %
L MONOCYTOG DNA CSF QL NAA+NON-PROBE: NOT DETECTED
LYMPHOCYTE MAN % CSF (OLG): 80 %
LYMPHOCYTES # BLD AUTO: 3.5 X10(3)/MCL (ref 0.6–4.6)
LYMPHOCYTES NFR BLD AUTO: 23.2 %
MCH RBC QN AUTO: 26.1 PG (ref 27–31)
MCHC RBC AUTO-ENTMCNC: 31.1 G/DL (ref 33–36)
MCV RBC AUTO: 83.9 FL (ref 80–94)
MONOCYTE MAN % CSF (OLG): 18 %
MONOCYTES # BLD AUTO: 1.19 X10(3)/MCL (ref 0.1–1.3)
MONOCYTES NFR BLD AUTO: 7.9 %
N MEN DNA CSF QL NAA+NON-PROBE: NOT DETECTED
NEUTROPHILS # BLD AUTO: 9.93 X10(3)/MCL (ref 2.1–9.2)
NEUTROPHILS MAN % CSF (OLG): 2 %
NEUTROPHILS NFR BLD AUTO: 65.8 %
NRBC BLD AUTO-RTO: 0 %
PARECHOVIRUS A RNA CSF QL NAA+NON-PROBE: NOT DETECTED
PLATELET # BLD AUTO: 298 X10(3)/MCL (ref 130–400)
PMV BLD AUTO: 10.5 FL (ref 7.4–10.4)
PROT CSF-MCNC: 18.7 MG/DL (ref 15–45)
RBC # BLD AUTO: 4.17 X10(6)/MCL (ref 4.2–5.4)
RBC # CSF MANUAL: 0 /UL
S PNEUM DNA CSF QL NAA+NON-PROBE: NOT DETECTED
TNC CSF (OLG): 2 /UL
TUBE NUMBER CSF (BEAKER): 3
VZV DNA CSF QL NAA+NON-PROBE: NOT DETECTED
WBC # BLD AUTO: 15.09 X10(3)/MCL (ref 4.5–11.5)

## 2025-04-07 PROCEDURE — 82945 GLUCOSE OTHER FLUID: CPT | Performed by: INTERNAL MEDICINE

## 2025-04-07 PROCEDURE — 87206 SMEAR FLUORESCENT/ACID STAI: CPT | Performed by: INTERNAL MEDICINE

## 2025-04-07 PROCEDURE — 86618 LYME DISEASE ANTIBODY: CPT | Performed by: INTERNAL MEDICINE

## 2025-04-07 PROCEDURE — 83615 LACTATE (LD) (LDH) ENZYME: CPT | Performed by: INTERNAL MEDICINE

## 2025-04-07 PROCEDURE — 25000003 PHARM REV CODE 250: Performed by: NURSE PRACTITIONER

## 2025-04-07 PROCEDURE — 87899 AGENT NOS ASSAY W/OPTIC: CPT | Performed by: INTERNAL MEDICINE

## 2025-04-07 PROCEDURE — 009U3ZX DRAINAGE OF SPINAL CANAL, PERCUTANEOUS APPROACH, DIAGNOSTIC: ICD-10-PCS | Performed by: RADIOLOGY

## 2025-04-07 PROCEDURE — 86140 C-REACTIVE PROTEIN: CPT | Performed by: INTERNAL MEDICINE

## 2025-04-07 PROCEDURE — 85025 COMPLETE CBC W/AUTO DIFF WBC: CPT | Performed by: INTERNAL MEDICINE

## 2025-04-07 PROCEDURE — 89051 BODY FLUID CELL COUNT: CPT | Performed by: INTERNAL MEDICINE

## 2025-04-07 PROCEDURE — 87070 CULTURE OTHR SPECIMN AEROBIC: CPT | Performed by: INTERNAL MEDICINE

## 2025-04-07 PROCEDURE — 87798 DETECT AGENT NOS DNA AMP: CPT | Performed by: INTERNAL MEDICINE

## 2025-04-07 PROCEDURE — 87529 HSV DNA AMP PROBE: CPT | Performed by: INTERNAL MEDICINE

## 2025-04-07 PROCEDURE — 86255 FLUORESCENT ANTIBODY SCREEN: CPT | Performed by: INTERNAL MEDICINE

## 2025-04-07 PROCEDURE — 87483 CNS DNA AMP PROBE TYPE 12-25: CPT | Performed by: INTERNAL MEDICINE

## 2025-04-07 PROCEDURE — 25000003 PHARM REV CODE 250: Performed by: INTERNAL MEDICINE

## 2025-04-07 PROCEDURE — 86592 SYPHILIS TEST NON-TREP QUAL: CPT | Performed by: INTERNAL MEDICINE

## 2025-04-07 PROCEDURE — 87116 MYCOBACTERIA CULTURE: CPT | Performed by: INTERNAL MEDICINE

## 2025-04-07 PROCEDURE — 36415 COLL VENOUS BLD VENIPUNCTURE: CPT | Performed by: INTERNAL MEDICINE

## 2025-04-07 PROCEDURE — 82232 ASSAY OF BETA-2 PROTEIN: CPT | Performed by: INTERNAL MEDICINE

## 2025-04-07 PROCEDURE — 84157 ASSAY OF PROTEIN OTHER: CPT | Performed by: INTERNAL MEDICINE

## 2025-04-07 PROCEDURE — 25500020 PHARM REV CODE 255: Performed by: INTERNAL MEDICINE

## 2025-04-07 RX ORDER — DOXYCYCLINE HYCLATE 100 MG
100 TABLET ORAL EVERY 12 HOURS
Qty: 10 TABLET | Refills: 0 | Status: SHIPPED | OUTPATIENT
Start: 2025-04-07 | End: 2025-04-12

## 2025-04-07 RX ORDER — DOXYCYCLINE HYCLATE 100 MG
100 TABLET ORAL EVERY 12 HOURS
Status: DISCONTINUED | OUTPATIENT
Start: 2025-04-07 | End: 2025-04-07 | Stop reason: HOSPADM

## 2025-04-07 RX ADMIN — IOHEXOL 100 ML: 350 INJECTION, SOLUTION INTRAVENOUS at 03:04

## 2025-04-07 RX ADMIN — DOCUSATE SODIUM 200 MG: 100 CAPSULE, LIQUID FILLED ORAL at 09:04

## 2025-04-07 RX ADMIN — DOXYCYCLINE HYCLATE 100 MG: 100 TABLET, COATED ORAL at 06:04

## 2025-04-07 RX ADMIN — BUTALBITAL, ACETAMINOPHEN, AND CAFFEINE 1 TABLET: 50; 325; 40 TABLET ORAL at 05:04

## 2025-04-07 NOTE — SUBJECTIVE & OBJECTIVE
Subjective:     Interval History:   Neurologic exam remains grossly normal. No further seizure like activity overnight or this AM per pt report.     Current Neurological Medications:     Current Medications[1]    Review of Systems  A 14pt ros was reviewed & is negative unless o/w documented in the hpi    Objective:     Vital Signs (Most Recent):  Temp: 98.2 °F (36.8 °C) (04/07/25 1045)  Pulse: 86 (04/07/25 1045)  Resp: 18 (04/07/25 1045)  BP: 102/70 (04/07/25 1045)  SpO2: 100 % (04/07/25 1045) Vital Signs (24h Range):  Temp:  [98 °F (36.7 °C)-99.1 °F (37.3 °C)] 98.2 °F (36.8 °C)  Pulse:  [] 86  Resp:  [18-20] 18  SpO2:  [95 %-100 %] 100 %  BP: ()/(61-72) 102/70     Weight: 78.9 kg (174 lb)  Body mass index is 29.87 kg/m².     Physical Exam   GENERAL: NAD, calm, cooperative, appropriate, awake/alert  MENTAL STATUS: Oriented x4, follows commands reliably  SPEECH/LANGUAGE: Clear, coherent  CN:  EOMI gaze conjugate, visual fields intact  PERRLA  Motor: no focal motor weakness  Sensory: Normal to tactile stim/vibration  DTRs: Normal [+2]  Gait: not observed         Significant Labs:   Recent Lab Results         04/07/25  0422   04/07/25  0339        Baso #   0.05       Basophil %   0.3       CRP 96.40         Eos #   0.36       Eos %   2.4       Hematocrit   35.0       Hemoglobin   10.9       Immature Grans (Abs)   0.06       Immature Granulocytes   0.4       Lymph #   3.50       LYMPH %   23.2       MCH   26.1       MCHC   31.1       MCV   83.9       Mono #   1.19       Mono %   7.9       MPV   10.5       Neut #   9.93       Neut %   65.8       nRBC   0.0       Platelet Count   298       RBC   4.17       RDW   13.6       WBC   15.09               Significant Imaging: I have reviewed all pertinent imaging results/findings within the past 24 hours.       [1]   Current Facility-Administered Medications   Medication Dose Route Frequency Provider Last Rate Last Admin    acetaminophen tablet 1,000 mg  1,000 mg  Oral Q6H PRN Sarah Swain FNP   1,000 mg at 04/06/25 0554    bisacodyL suppository 10 mg  10 mg Rectal Daily PRN Sherice Bosch AGACNP-BC        butalbital-acetaminophen-caffeine -40 mg per tablet 1 tablet  1 tablet Oral Q8H PRN Sarah Swain FNP   1 tablet at 04/05/25 2158    cetirizine tablet 10 mg  10 mg Oral Daily PRN Sarah Swain FNP   10 mg at 04/06/25 0554    docusate sodium capsule 200 mg  200 mg Oral BID Ama Esparza MD   200 mg at 04/07/25 0937    labetaloL injection 10 mg  10 mg Intravenous Q6H PRN Sherice Bosch AGACNP-BC        LORazepam injection 2 mg  2 mg Intravenous Q4H PRN Sherice Bosch AGACNP-BC        ondansetron injection 4 mg  4 mg Intravenous Q6H PRN Ama Esparza MD   4 mg at 04/06/25 0679

## 2025-04-07 NOTE — ASSESSMENT & PLAN NOTE
Resolved now.   Unclear etiology, discussed diagnostic possibilities--seizure, confusional migraine, syncope  EEG normal  Plans for LP today  CT abd/pelvis pending per ID  Further recommendations per MD

## 2025-04-07 NOTE — PROGRESS NOTES
[facility]  Hospital Medicine Progress Note        Chief Complaint: Inpatient Follow-up for encephalopathy     HPI:   28 yo female with a PMHx of anxiety/depression presented to [facility] via EMS on 4/4/2025 with c/o altered mental status that began on the morning of presentation.  On arrival, Pt was unable to give any history due to being altered. Initial history obtained from pt's mother who reported Pt developed generalized moderate intensity headache associated with nausea, some light intolerance and extreme fatigue previous night. Denies neck pain or neck stiffness. Symptoms persisted and therefore Pt went to an urgent care clinic around 9:00 am and about 15 minutes after being placed in the exam room,  she became altered with decreased responsiveness, nonverbal and urinary incontinence.  EMS reported patient remained nonverbal, but was moving all extremities and maintaining her airway. No history of seizure disorder in the past.      On arrival to the ED, Pt was afebrile but tachycardic with , bradypnea with RR 8, /81 and O2sat 100% on RA. Labs showed leukocytosis 19.8K, Hgb 11.5, Na 136, K 3.9, Bicarb 14, Cr 0.7, Mg 1.5, CRP 20, , Neg troponin, neg preg test, neg UDS, neg blood alcohol, neg flu/covid/RSV, neg Resp PCR panel, UA without infection, lactic 3.4 then repeat normal. CT head without contrast negative for acute intracranial findings.  CXR negative for acute cardiopulmonary process. CTA chest degraded by patient motion and suboptimal contrast opacification however  no gross evidence of  PE, no acute infiltrates or effusions.  Pt was given IVF in the ED and admitted to  service. Neurology was consulted. MRI brain, EEG and LP ordered. LP is not available until Monday 4/7/25.      MRI brain on 4/4/25 degraded by motion artifact but neg for acute abnormality. Pt was evaluated by Neurology and underwent EEG on 4/5/25 and was reportedly normal. Additional  workup include echocardiogram showed positive bubble study with possibility of intrapulmonary shunting. Cardiology was consulted and suggested HAYLEY as outpatient and follow up in the Structural Heart disease clinic and 2 weeks MCT monitoring.     Pt's mental status improved to baseline without specific intervention. Headaches have  improved. However course complicated by persistent moderate leukocytosis of 19K and rising CRP 20>92>127 without clear evidence of  infection. Procalcitonin 0.08. Blood cultures x 2 from admission neg x 48h. ID consult requested to assist       Interval Hx:   Tmax 99.6.   Headaches are better. Today Pt c/o some sore throat. On exam, no pharyngeal erythema or enlarged tonsils noted. Some tenderness appreciated on palpation of submandibular area but no lymphadenopathy appreciated.       Case was discussed with patient's nurse and  on the floor.    Objective/physical exam:  General: In no acute distress, afebrile  Chest: Clear to auscultation bilaterally  Heart: RRR, +S1, S2, no appreciable murmur  Abdomen: Soft, nontender, BS +  MSK: Warm, no lower extremity edema, no clubbing or cyanosis  Neurologic: Alert and oriented x4, Cranial nerve II-XII intact, Strength 5/5 in all 4 extremities    VITAL SIGNS: 24 HRS MIN & MAX LAST   Temp  Min: 98 °F (36.7 °C)  Max: 99.6 °F (37.6 °C) 98.3 °F (36.8 °C)   BP  Min: 90/61  Max: 111/78 110/72   Pulse  Min: 86  Max: 111  86   Resp  Min: 18  Max: 18 18   SpO2  Min: 95 %  Max: 100 % 95 %     I have reviewed the following labs:  Recent Labs   Lab 04/04/25  1052 04/05/25  0635 04/06/25  0755   WBC 19.88* 17.77* 19.76*   RBC 4.42 4.01* 4.18*   HGB 11.5* 10.5* 10.9*   HCT 36.4* 34.1* 34.4*   MCV 82.4 85.0 82.3   MCH 26.0* 26.2* 26.1*   MCHC 31.6* 30.8* 31.7*   RDW 13.4 13.9 13.7    267 303   MPV 10.5* 10.3 11.1*     Recent Labs   Lab 04/04/25  1052 04/05/25  0635 04/06/25  0755    138 138   K 3.9 4.0 4.0    109* 106   CO2 14* 18*  20*   BUN 9.5 7.6 5.9*   CREATININE 0.78 0.67 0.69   CALCIUM 9.5 8.1* 8.5   MG 1.50* 2.20 2.10   ALBUMIN 4.0 3.4* 3.7   ALKPHOS 64 53 67   ALT 14 11 10   AST 21 13 13   BILITOT 0.5 0.3 0.3     Microbiology Results (last 7 days)       Procedure Component Value Units Date/Time    Blood culture #1 **CANNOT BE ORDERED STAT** [[id number]]  (Normal) Collected: 04/04/25 1122    Order Status: Completed Specimen: Blood Updated: 04/06/25 1401     Blood Culture No Growth At 48 Hours    Blood culture #2 **CANNOT BE ORDERED STAT** [[id number]]  (Normal) Collected: 04/04/25 1123    Order Status: Completed Specimen: Blood Updated: 04/06/25 1300     Blood Culture No Growth At 48 Hours    Cryptococcal antigen, CSF [[id number]]     Order Status: Sent Specimen: CSF (Spinal Fluid) from CSF Tap, Tube 2     Cerebrospinal Fluid Culture [[id number]]     Order Status: Sent Specimen: CSF (Spinal Fluid) from CSF Tap, Tube 1     AFB Smear [[id number]]     Order Status: Sent Specimen: CSF (Spinal Fluid) from Cerebrospinal Fluid     Mycobacteria and Nocardia Culture [[id number]]     Order Status: Sent Specimen: CSF (Spinal Fluid) from Cerebrospinal Fluid              See below for Radiology    Assessment/Plan:  Acute encephalopathy, unspecified type due to Syncope vs Seizure episode vs other etiology, POA  Moderate leukocytosis, POA- infection vs reactive. No infectious etiology identified   Headache, POA- improved   Abnormal Echocardiogram with positive bubble study, POA  Normocytic anemia / anemia of chronic disease   Metabolic acidosis /Lactic acidosis , POA- improved   Hypomagnesemia, moderate -POA  Elevated inflammatory markers, POA- rising   Obesity, BMI 29.8     Hx-   Leukocytosis again noted. CRP is rising.  Infectious etiology not identified   Await LP in am   Will consult ID for input given persistent leukocytosis   Low suspicion for bacterial infection   Viral etiology still possible   Monitor course      VTE prophylaxis: SCDc      Patient condition:  Fair     Anticipated discharge and Disposition:     Home with family               All diagnosis and differential diagnosis have been reviewed; assessment and plan has been documented; I have personally reviewed the labs and test results that are presently available; I have reviewed the patients medication list; I have reviewed the consulting providers response and recommendations. I have reviewed or attempted to review medical records based upon their availability    All of the patient's questions have been  addressed and answered. Patient's is agreeable to the above stated plan. I will continue to monitor closely and make adjustments to medical management as needed.    Portions of this note dictated using EMR integrated voice recognition software, and may be subject to voice recognition errors not corrected at proofreading. Please contact writer for clarification if needed.   _____________________________________________________________________    Malnutrition Status:  Nutrition consulted. Most recent weight and BMI monitored-     Measurements:  Wt Readings from Last 1 Encounters:   04/05/25 78.9 kg (174 lb)   Body mass index is 29.87 kg/m².    Patient has been screened and assessed by RD.    Malnutrition Type:  Context:    Level:      Malnutrition Characteristic Summary:       Interventions/Recommendations (treatment strategy):        Scheduled Med:   docusate sodium  200 mg Oral BID      Continuous Infusions:     PRN Meds:    Current Facility-Administered Medications:     acetaminophen, 1,000 mg, Oral, Q6H PRN    bisacodyL, 10 mg, Rectal, Daily PRN    butalbital-acetaminophen-caffeine -40 mg, 1 tablet, Oral, Q8H PRN    cetirizine, 10 mg, Oral, Daily PRN    labetalol, 10 mg, Intravenous, Q6H PRN    lorazepam, 2 mg, Intravenous, Q4H PRN    ondansetron, 4 mg, Intravenous, Q6H PRN     Radiology:  I have personally reviewed the following imaging and agree with the radiologist.     MRI  Brain Without Contrast  EXAMINATION  MRI BRAIN WITHOUT CONTRAST    CLINICAL HISTORY  Stroke, follow up;    TECHNIQUE  Multiplanar, multisequence MR images were obtained without the intravenous administration of gadolinium-based contrast media.    COMPARISON  4 April 2025 non-contrast head CT    FINDINGS  Exam quality: Limited secondary to patient movement throughout image acquisition, with resulting artifact.    Parenchyma: No restricted diffusion or other suggestion of acute ischemic insult.  No convincing evidence of intraparenchymal hemorrhage.  Differentiation of the gray-white border is grossly preserved.  No discrete mass, localized mass effect, or midline shift appreciated.    CSF spaces: No evidence of extra-axial blood, expansile collection, or mass-like focal findings.  Normal size and configuration of the ventricles.  The basal cisterns are preserved.  Sulcal volume appears normal for patient age.    Sella/Suprasellar structures: No abnormalities.    Vasculature: Normal flow signal voids within the large intracranial arteries.  No focal abnormality of the dural sinuses.    Other findings: No abnormalities of the skull or scalp.  Mastoid air cells are well aerated.  Facial cavities are clear, with no fluid level.    IMPRESSION  1. Motion degraded exam secondary to patient head movement during acquisition.  2. Within limitations, no convincing acute or focal intracranial abnormality.    Electronically signed by: Walker Silva  Date:    04/04/2025  Time:    19:53  Echo    Left Ventricle: The left ventricle is normal in size. Normal wall   thickness. There is normal systolic function with a visually estimated   ejection fraction of 60 - 65%. There is normal diastolic function.    Right Ventricle: Systolic function is normal.    Left Atrium: Agitated saline study of the atrial septum is late   positive, consistent with intrapulmonary shunt rather than intra-cardiac   origin.    There are no significant valvular  abnormalities.    Pericardium: There is no pericardial effusion.  CTA Chest Non-Coronary (PE Studies)  Narrative: EXAMINATION:  CTA CHEST NON CORONARY (PE STUDIES)    CLINICAL HISTORY:  Pulmonary embolism (PE) suspected, positive D-dimer;    TECHNIQUE:  Sequential axial images performed of the chest using pulmonary embolism protocol following intravenous contrast bolus. Sagittal and contrast reformations performed.  MIP images are also performed.    Dose product length of 218 mGycm. Automated exposure control was utilized to minimize radiation dose.    COMPARISON:  Chest radiograph April 4, 2025    FINDINGS:  Suboptimal exam with upper thoracic images degraded by artifacts caused by the patient motion.  There is also suboptimal contrast bolus timing with limited contrast opacification of the pulmonary arterial system.  No gross evidence of main pulmonary arteries or proximal pulmonary arteries branches filling defects to suggest pulmonary thromboembolism.  Aortic arch assessment is nondiagnostic.  Otherwise, the thoracic aorta is without aneurysmal dilatation or dissection.  Cardiac chamber size is within normal limits.  No pericardial effusion.    Limited assessment of the upper lungs.  Otherwise, as visualized no pulmonary edema or consolidation.  No fluid within the pleural and the pericardial spaces.  Impression: 1.  Limited exam with extensive artifacts caused by patient motion and suboptimal contrast opacification of the pulmonary arterial system.  No gross evidence of main pulmonary arteries and proximal main branches thromboembolism.    2.  No acute infiltrates or effusions.    Electronically signed by: Magdy Branch  Date:    04/04/2025  Time:    13:02  CT Head Without Contrast  Narrative: EXAMINATION:  CT HEAD WITHOUT CONTRAST    CLINICAL HISTORY:  Mental status change, unknown cause;    TECHNIQUE:  CT imaging of the head performed from the skull base to the vertex without intravenous contrast.  DLP 5986  mGycm. Automatic exposure control, adjustment of mA/kV or iterative reconstruction technique was used to reduce radiation.    COMPARISON:  None Available.    FINDINGS:  There is no acute cortical infarct, hemorrhage or mass lesion.  The ventricles are normal in size.    No significant paranasal sinus inflammation.  Mastoid air cells are clear.  Impression: No acute intracranial findings.    Electronically signed by: Ernesto Rodriguez  Date:    04/04/2025  Time:    12:20  X-Ray Chest AP Portable  Narrative: EXAMINATION:  XR CHEST AP PORTABLE    CLINICAL HISTORY:  AMS of unknown etiology;    TECHNIQUE:  One view    COMPARISON:  January 14, 2024.    FINDINGS:  Cardiopericardial silhouette is within normal limits.  No acute dense focal or segmental consolidation, congestive process, pleural effusions or pneumothorax.  Impression: No acute cardiopulmonary process identified.    Electronically signed by: Magdy Branch  Date:    04/04/2025  Time:    11:20      Ama Esparza MD  Department of Hospital Medicine   [facility]   04/06/2025

## 2025-04-07 NOTE — PROGRESS NOTES
[facility] - 4th Floor Medical Telemetry  Neurology  Progress Note    Patient Name: Yesi Porter  MRN: [id number]  Admission Date: 4/4/2025  Hospital Length of Stay: 3 days  Code Status: Full Code   Attending Provider: Ama Esparza MD  Primary Care Physician: Mitzi Dow FNP   Principal Problem:<principal problem not specified>    HPI:   29 y.o. female with a PMHx of anxiety/depression who presented to [facility] via EMS on 4/4/2025 with c/o altered mental status that began on the morning of presentation.  Patient initially presented to an [facility] urgent care around 9:00 a.m. She was on the phone with her mother, who noted she was not responding 15 minutes after being at urgent care. Patient does not recall events. She was awake but not responsive for several hours.  No seizure activity was noted. She is now with normal mentation without any complaints. Denies recent illness. Did have a frontal headache prior to symptoms occurring and still has HA. It is mild but present. Denies vision issues. NO hx of migraine    Overview/Hospital Course:  No notes on file        Subjective:     Interval History:   Neurologic exam remains grossly normal. No further seizure like activity overnight or this AM per pt report.     Current Neurological Medications:     Current Medications[1]    Review of Systems  A 14pt ros was reviewed & is negative unless o/w documented in the hpi    Objective:     Vital Signs (Most Recent):  Temp: 98.2 °F (36.8 °C) (04/07/25 1045)  Pulse: 86 (04/07/25 1045)  Resp: 18 (04/07/25 1045)  BP: 102/70 (04/07/25 1045)  SpO2: 100 % (04/07/25 1045) Vital Signs (24h Range):  Temp:  [98 °F (36.7 °C)-99.1 °F (37.3 °C)] 98.2 °F (36.8 °C)  Pulse:  [] 86  Resp:  [18-20] 18  SpO2:  [95 %-100 %] 100 %  BP: ()/(61-72) 102/70     Weight: 78.9 kg (174 lb)  Body mass index is 29.87 kg/m².     Physical Exam   GENERAL: NAD, calm, cooperative, appropriate, awake/alert  MENTAL STATUS:  Oriented x4, follows commands reliably  SPEECH/LANGUAGE: Clear, coherent  CN:  EOMI gaze conjugate, visual fields intact  PERRLA  Motor: no focal motor weakness  Sensory: Normal to tactile stim/vibration  DTRs: Normal [+2]  Gait: not observed         Significant Labs:   Recent Lab Results         04/07/25  0422   04/07/25  0339        Baso #   0.05       Basophil %   0.3       CRP 96.40         Eos #   0.36       Eos %   2.4       Hematocrit   35.0       Hemoglobin   10.9       Immature Grans (Abs)   0.06       Immature Granulocytes   0.4       Lymph #   3.50       LYMPH %   23.2       MCH   26.1       MCHC   31.1       MCV   83.9       Mono #   1.19       Mono %   7.9       MPV   10.5       Neut #   9.93       Neut %   65.8       nRBC   0.0       Platelet Count   298       RBC   4.17       RDW   13.6       WBC   15.09               Significant Imaging: I have reviewed all pertinent imaging results/findings within the past 24 hours.    Assessment and Plan:     Encephalopathy acute  Resolved now.   Unclear etiology, discussed diagnostic possibilities--seizure, confusional migraine, syncope  EEG normal  Plans for LP today  CT abd/pelvis pending per ID  Further recommendations per MD          VTE Risk Mitigation (From admission, onward)           Ordered     IP VTE HIGH RISK PATIENT  Once         04/04/25 1425     Place sequential compression device  Until discontinued         04/04/25 1425                    [facility]  Neurology  [facility] - 4th Floor Medical Telemetry       [1]   Current Facility-Administered Medications   Medication Dose Route Frequency Provider Last Rate Last Admin    acetaminophen tablet 1,000 mg  1,000 mg Oral Q6H PRN Sarah Swain FNP   1,000 mg at 04/06/25 0554    bisacodyL suppository 10 mg  10 mg Rectal Daily PRN Sherice Bosch AGACNP-BC        butalbital-acetaminophen-caffeine -40 mg per tablet 1 tablet  1 tablet Oral Q8H PRN Sarah Swain FNP    1 tablet at 04/05/25 2158    cetirizine tablet 10 mg  10 mg Oral Daily PRN Sarah Swain FNP   10 mg at 04/06/25 0554    docusate sodium capsule 200 mg  200 mg Oral BID Ama Esparza MD   200 mg at 04/07/25 0937    labetaloL injection 10 mg  10 mg Intravenous Q6H PRN Sherice Bosch AGACNP-BC        LORazepam injection 2 mg  2 mg Intravenous Q4H PRN Sherice Bosch AGACNP-BC        ondansetron injection 4 mg  4 mg Intravenous Q6H PRN Ama Esparza MD   4 mg at 04/06/25 0650

## 2025-04-08 ENCOUNTER — PATIENT OUTREACH (OUTPATIENT)
Dept: ADMINISTRATIVE | Facility: CLINIC | Age: 30
End: 2025-04-08
Payer: COMMERCIAL

## 2025-04-08 ENCOUNTER — OFFICE VISIT (OUTPATIENT)
Dept: FAMILY MEDICINE | Facility: CLINIC | Age: 30
End: 2025-04-08
Payer: COMMERCIAL

## 2025-04-08 ENCOUNTER — TELEPHONE (OUTPATIENT)
Dept: FAMILY MEDICINE | Facility: CLINIC | Age: 30
End: 2025-04-08

## 2025-04-08 VITALS
WEIGHT: 180.19 LBS | DIASTOLIC BLOOD PRESSURE: 73 MMHG | BODY MASS INDEX: 30.76 KG/M2 | SYSTOLIC BLOOD PRESSURE: 113 MMHG | HEART RATE: 104 BPM | OXYGEN SATURATION: 99 % | TEMPERATURE: 99 F | HEIGHT: 64 IN

## 2025-04-08 DIAGNOSIS — Z09 HOSPITAL DISCHARGE FOLLOW-UP: Primary | ICD-10-CM

## 2025-04-08 DIAGNOSIS — R51.9 NEW ONSET OF HEADACHES: ICD-10-CM

## 2025-04-08 DIAGNOSIS — D72.829 LEUKOCYTOSIS, UNSPECIFIED TYPE: ICD-10-CM

## 2025-04-08 LAB — CRYPTOC AG CSF QL IA.RAPID: NEGATIVE

## 2025-04-08 NOTE — PROGRESS NOTES
SUBJECTIVE:     History of Present Illness      Chief Complaint: Follow-up (Hospital f/u/DC 25)    HPI:  Patient is a 29 y.o. year old female who presents to clinic for follow-up.  Patient presented to [facility] on 25  with complaints of altered mental status.  Patient had  increase headache and nausea and extreme fatigue.Symptoms persisted and therefore Pt went to  urgent care clinic  and about 15 minutes after being placed in the exam room, she became altered with decreased responsiveness, nonverbal and urinary incontinence.  EMS was called and patient was then transported to [facility].  Patient's mental status improved without any specific intervention during her hospital stay.  Her headaches improved.  During her hospital stay noticed to have moderate leukocytosis and elevated and rising . normal imaging MRI brain, CT chest, negative respiratory panel.  CSF labs still pending.  Patientis present  today with her mother.  She reports she does feel better still has increased fatigue weakness.  She was discharged yesterday evening.  She did start her 1st dose of doxycycline this morning and will follow up with 2nd dose this afternoon.  Chronic Leukocytosis noted from labs since 2018     Pt was evaluated by Neurology and underwent EEG. Out pt appoint 2025 . Cardiology was consulted and suggested HAYLEY as outpatient  she has an appointment in May .      Review of Systems:    Review of Systems    12 point review of systems conducted, negative except as stated in the history of present illness. See HPI for details.     Previous History      PCP: Mitzi Dow FNP  Review of patient's allergies indicates:  No Known Allergies    Past Medical History:   Diagnosis Date    Depression        Past Surgical History:   Procedure Laterality Date    ABCESS DRAINAGE       SECTION       Family History   Problem Relation Name Age of Onset    Atrial fibrillation Mother      Arrhythmia Mother      Liver  "cancer Maternal Grandfather      Lung cancer Paternal Grandmother         Social History[1]     Health Maintenance      Health Maintenance   Topic Date Due    Influenza Vaccine (1) 09/01/2024    COVID-19 Vaccine (3 - 2024-25 season) 09/01/2024    Pap Smear  07/31/2027    TETANUS VACCINE  03/31/2028    RSV Vaccine (Age 60+ and Pregnant patients) (1 - 1-dose 75+ series) 07/22/2071    Hepatitis C Screening  Completed    HIV Screening  Completed    Lipid Panel  Completed    Pneumococcal Vaccines (Age 0-49)  Aged Out       OBJECTIVE:     Physical Exam      Vital Signs Reviewed   Visit Vitals  /73 (BP Location: Right arm, Patient Position: Sitting)   Pulse 104   Temp 99.2 °F (37.3 °C) (Oral)   Ht 5' 4" (1.626 m)   Wt 81.7 kg (180 lb 3.2 oz)   LMP 04/05/2025 (Exact Date)   SpO2 99%   BMI 30.93 kg/m²       Physical Exam    Physical Exam:  General: Alert, well nourished, no acute distress, non-toxic appearing.   Eyes: Anicteric sclera, without conjunctival injection, normal lids, no purulent drainage, EOMs grossly intact.   Ears: No tragal tenderness. Tympanic membranes intact, pearly grey, without effusion or erythema and with a positive light reflex.   Mouth: Posterior pharynx without erythema. No exudate, ulcerations, or lesion. No tonsillar swelling.   Neck: Supple, full ROM, no rigidity, no cervical adenopathy.   Cardio: Normal rate and rhythm    Resp: Respirations even and unlabored, clear to auscultation bilaterally.   Abd: No ecchymosis or distension. Normal bowel sounds in all 4 quadrants. No tenderness to palpation. No rebound tenderness or guarding. No CVA tenderness.   Skin: No rashes or open lesions noted.   MSK: No swelling. No abrasions or signs of trauma. Ambulating without assistance.   Neuro: Alert,oriented No focal deficits noted. Facial expressions even.   Psych: Cooperative, Normal affect      Labs   Chemistry:  Lab Results   Component Value Date     04/06/2025    K 4.0 04/06/2025    BUN 5.9 " (L) 04/06/2025    CREATININE 0.69 04/06/2025    EGFRNORACEVR >60 04/06/2025    GLUCOSE 71 (L) 04/06/2025    CALCIUM 8.5 04/06/2025    ALKPHOS 67 04/06/2025    LABPROT 7.2 04/06/2025    ALBUMIN 3.7 04/06/2025    BILIDIR 0.10 11/08/2018    IBILI 0.20 11/08/2018    AST 13 04/06/2025    ALT 10 04/06/2025    MG 2.10 04/06/2025    PHOS 2.9 04/06/2025    IHDNRMWT30DL 12 (L) 09/26/2024    TSH 0.711 04/04/2025    PDGGJG6UTQL 0.85 10/27/2023        Lab Results   Component Value Date    HGBA1C 4.9 04/05/2025        Hematology:  Lab Results   Component Value Date    WBC 15.09 (H) 04/07/2025    HGB 10.9 (L) 04/07/2025    HCT 35.0 (L) 04/07/2025     04/07/2025       Lipid Panel:  Lab Results   Component Value Date    CHOL 133 04/04/2025    HDL 57 04/04/2025    LDL 69.00 04/04/2025    TRIG 33 (L) 04/04/2025    TOTALCHOLEST 2 04/04/2025        Urine:  Lab Results   Component Value Date    APPEARANCEUA Clear 04/04/2025    SGUA 1.012 04/04/2025    PROTEINUA Negative 04/04/2025    KETONESUA Trace (A) 04/04/2025    LEUKOCYTESUR Negative 04/04/2025    RBCUA None Seen 04/04/2025    WBCUA 0-5 04/04/2025    BACTERIA None Seen 04/04/2025    SQEPUA Trace 04/04/2025    HYALINECASTS 0-2 (A) 12/14/2017         Assessment         ICD-10-CM ICD-9-CM   1. Hospital discharge follow-up  Z09 V67.59   2. Leukocytosis, unspecified type  D72.829 288.60       Plan       Assessment & Plan  Hospital discharge follow-up  Stable   Symptoms improved  CSF labs still pending  EMG within normal limits  Patient has outpatient appointment scheduled with Neurology  Outpatient appointment scheduled Cardiology -May  Leukocytosis, unspecified type  Doxycycline- continue doxycycline as prescribed from hospital discharge  Chronic leukocytosis noted on lab work since 2018     Orders:    Ambulatory referral/consult to Hematology / Oncology; Future       Orders Placed This Encounter    Ambulatory referral/consult to Hematology / Oncology      Medication List with  Changes/Refills   Current Medications    DOXYCYCLINE (VIBRA-TABS) 100 MG TABLET    Take 1 tablet (100 mg total) by mouth every 12 (twelve) hours. for 5 days         Follow up in about 5 weeks (around 5/13/2025) for repeat CBC LABS Prior. In addition to their scheduled follow up, the patient has also been instructed to follow up on as needed basis.   Future Appointments   Date Time Provider Department Center   5/14/2025  2:15 PM Mitzi Dow FNP [facility]   6/17/2025  1:45 PM Tino Guzman MD [facility]   7/9/2025 11:00 AM Julianne Armando FNP [facility] [address] [place]              [1]   Social History  Tobacco Use    Smoking status: Never    Smokeless tobacco: Never   Substance Use Topics    Alcohol use: Not Currently     Comment: on occas    Drug use: Never

## 2025-04-08 NOTE — TELEPHONE ENCOUNTER
Spoke with Pt regarding her TCM appt at 3 PM 4-8-25. Pt stated she did not have any questions or concerns regarding any medications prescribed to her at her hospital stay. Pt did not need any clarifications either. Pt will be at her appt this afternoon.

## 2025-04-08 NOTE — PROGRESS NOTES
C3 nurse spoke with Yesi Porter  for a TCC post hospital discharge follow up call. The patient has a scheduled hospitals appointment with CATY Alonso  on 04/08/2025 @ 3 pm.  Appointment with CATY Newell (Neurology) on 07/09/2025 @ 11 am.

## 2025-04-09 ENCOUNTER — TELEPHONE (OUTPATIENT)
Dept: FAMILY MEDICINE | Facility: CLINIC | Age: 30
End: 2025-04-09
Payer: COMMERCIAL

## 2025-04-09 LAB
BACTERIA BLD CULT: NORMAL
BACTERIA BLD CULT: NORMAL
BODY FLD TYPE: NORMAL
HSV1 DNA CSF QL NAA+PROBE: NEGATIVE
HSV2 DNA CSF QL NAA+PROBE: NEGATIVE
LDH FLD L TO P-CCNC: <10 U/L
M AVIUM PARATB TISS QL ZN STN: NORMAL
PSYCHE PATHOLOGY RESULT: NORMAL
VDRL CSF QL: NEGATIVE

## 2025-04-09 RX ORDER — BUTALBITAL, ACETAMINOPHEN AND CAFFEINE 50; 325; 40 MG/1; MG/1; MG/1
1 TABLET ORAL EVERY 6 HOURS PRN
Qty: 12 TABLET | Refills: 0 | Status: SHIPPED | OUTPATIENT
Start: 2025-04-09 | End: 2025-04-09 | Stop reason: SDUPTHER

## 2025-04-09 RX ORDER — BUTALBITAL, ACETAMINOPHEN AND CAFFEINE 50; 325; 40 MG/1; MG/1; MG/1
1 TABLET ORAL EVERY 6 HOURS PRN
Qty: 12 TABLET | Refills: 0 | Status: SHIPPED | OUTPATIENT
Start: 2025-04-09 | End: 2025-05-09

## 2025-04-09 NOTE — ADDENDUM NOTE
"Transitions-of-Care ([facility]) Pharmacy Future COST Assessment:     /Nurse requesting test claim: [facility]    Pharmacy ran test claim for the following:     Drug Sig Covered/PA required Patient Copay per month   Eliquis (apixiban) BID Covered without PA $ 5.00     Patient Insurance Type: Medicare - this means they are NOT eligible for a monthly discount card in the future (see \"free month\" note below)    Deductible/Medicare Gap Issue? Unknown    Is patient signed up for M2B service? No    Is above drug(s) eligible for 1 month free through M2B service? Yes - free coupon is available   If eligible,  or [facility] can provide coupon upon request.     For billing questions, reach out to [facility] at [phone]  For M2B questions, reach out to [facility] Pharm[facility] at [phone]    Sourav SanchesD  Transitions of Care Clinical Pharmacist   [phone]  8/15/2022 09:14 EDT      " Addended by: MARIANNA LOPEZ on: 4/9/2025 03:22 PM     Modules accepted: Orders, Level of Service

## 2025-04-09 NOTE — TELEPHONE ENCOUNTER
Pt c/o CONCEPCION, reports since this morning. States a HA was hat she had prior to her recent incident that sent her to the hospital. Reports HA feels like a pressure, the same pressure she experienced prior to her incident. Pt states she did eat, dink fluids and take sinus meds, did not take tylenol or IBU because it did not relieve it in the hospital. Notified FNP PRISCILLA Malone. New order for Fioricet phoned to the pharmacy, per FNP. Pt made aware of new order and advised if s/s worsen report to ED/UC. Understanding voiced.   ----- Message from Cathy sent at 4/9/2025  3:06 PM CDT -----  Pt wants nurse to give her a call back, ASAP

## 2025-04-10 LAB
B2 MICROGLOB CSF-MCNC: 0.71 MCG/ML (ref 0.7–1.8)
WNV RNA CSF QL NAA+PROBE: NEGATIVE

## 2025-04-11 LAB
B AFZ+BURG+GARI IGG CSF QL IA: NEGATIVE
B AFZ+BURG+GARI IGG SER QL IA: NORMAL
IMMUNOLOGIST REVIEW: NORMAL
LEFT CCA DIST DIAS: 31 CM/S
LEFT CCA DIST SYS: 111 CM/S
LEFT CCA PROX DIAS: 25 CM/S
LEFT CCA PROX SYS: 132 CM/S
LEFT ECA DIAS: 28 CM/S
LEFT ECA SYS: 155 CM/S
LEFT ICA DIST DIAS: 26 CM/S
LEFT ICA DIST SYS: 77 CM/S
LEFT ICA MID DIAS: 22 CM/S
LEFT ICA MID SYS: 147 CM/S
LEFT ICA PROX DIAS: 24 CM/S
LEFT ICA PROX SYS: 114 CM/S
LEFT VERTEBRAL DIAS: 13 CM/S
LEFT VERTEBRAL SYS: 71 CM/S
OHS CV CAROTID RIGHT ICA EDV HIGHEST: 28
OHS CV CAROTID ULTRASOUND LEFT ICA/CCA RATIO: 1.32
OHS CV CAROTID ULTRASOUND RIGHT ICA/CCA RATIO: 1.25
OHS CV PV CAROTID LEFT HIGHEST CCA: 132
OHS CV PV CAROTID LEFT HIGHEST ICA: 147
OHS CV PV CAROTID RIGHT HIGHEST CCA: 128
OHS CV PV CAROTID RIGHT HIGHEST ICA: 139
OHS CV US CAROTID LEFT HIGHEST EDV: 26
RIGHT CCA DIST DIAS: 20 CM/S
RIGHT CCA DIST SYS: 111 CM/S
RIGHT CCA PROX DIAS: 25 CM/S
RIGHT CCA PROX SYS: 128 CM/S
RIGHT ECA DIAS: 29 CM/S
RIGHT ECA SYS: 149 CM/S
RIGHT ICA DIST DIAS: 0 CM/S
RIGHT ICA DIST SYS: 109 CM/S
RIGHT ICA MID DIAS: 25 CM/S
RIGHT ICA MID SYS: 139 CM/S
RIGHT ICA PROX DIAS: 28 CM/S
RIGHT ICA PROX SYS: 138 CM/S
RIGHT VERTEBRAL DIAS: 13 CM/S
RIGHT VERTEBRAL SYS: 62 CM/S
VIT B1 BLD-SCNC: 130 NMOL/L (ref 70–180)

## 2025-04-12 LAB
BACTERIA CSF CULT: NORMAL
GRAM STN SPEC: NORMAL

## 2025-04-14 ENCOUNTER — HOSPITAL ENCOUNTER (EMERGENCY)
Facility: HOSPITAL | Age: 30
Discharge: HOME | End: 2025-04-14
Attending: EMERGENCY MEDICINE
Payer: COMMERCIAL

## 2025-04-14 VITALS
BODY MASS INDEX: 30.73 KG/M2 | HEIGHT: 64 IN | HEART RATE: 80 BPM | RESPIRATION RATE: 16 BRPM | OXYGEN SATURATION: 100 % | SYSTOLIC BLOOD PRESSURE: 116 MMHG | WEIGHT: 180 LBS | TEMPERATURE: 98 F | DIASTOLIC BLOOD PRESSURE: 85 MMHG

## 2025-04-14 DIAGNOSIS — R42 DIZZINESS: ICD-10-CM

## 2025-04-14 DIAGNOSIS — R42 LIGHTHEADEDNESS: Primary | ICD-10-CM

## 2025-04-14 LAB
ALBUMIN SERPL-MCNC: 3.9 G/DL (ref 3.5–5)
ALBUMIN/GLOB SERPL: 1.1 RATIO (ref 1.1–2)
ALP SERPL-CCNC: 56 UNIT/L (ref 40–150)
ALT SERPL-CCNC: 17 UNIT/L (ref 0–55)
ANION GAP SERPL CALC-SCNC: 9 MEQ/L
AST SERPL-CCNC: 15 UNIT/L (ref 11–45)
B-HCG UR QL: NEGATIVE
BASOPHILS # BLD AUTO: 0.06 X10(3)/MCL
BASOPHILS NFR BLD AUTO: 0.5 %
BILIRUB SERPL-MCNC: 0.2 MG/DL
BILIRUB UR QL STRIP.AUTO: NEGATIVE
BUN SERPL-MCNC: 10.4 MG/DL (ref 7–18.7)
CALCIUM SERPL-MCNC: 9.3 MG/DL (ref 8.4–10.2)
CHLORIDE SERPL-SCNC: 107 MMOL/L (ref 98–107)
CLARITY UR: CLEAR
CO2 SERPL-SCNC: 23 MMOL/L (ref 22–29)
COLOR UR AUTO: NORMAL
CREAT SERPL-MCNC: 0.76 MG/DL (ref 0.55–1.02)
CREAT/UREA NIT SERPL: 14
EOSINOPHIL # BLD AUTO: 0.25 X10(3)/MCL (ref 0–0.9)
EOSINOPHIL NFR BLD AUTO: 2 %
ERYTHROCYTE [DISTWIDTH] IN BLOOD BY AUTOMATED COUNT: 13.7 % (ref 11.5–17)
GFR SERPLBLD CREATININE-BSD FMLA CKD-EPI: >60 ML/MIN/1.73/M2
GLOBULIN SER-MCNC: 3.5 GM/DL (ref 2.4–3.5)
GLUCOSE SERPL-MCNC: 113 MG/DL (ref 74–100)
GLUCOSE UR QL STRIP: NEGATIVE
HCT VFR BLD AUTO: 36.4 % (ref 37–47)
HGB BLD-MCNC: 11.4 G/DL (ref 12–16)
HGB UR QL STRIP: NEGATIVE
IMM GRANULOCYTES # BLD AUTO: 0.05 X10(3)/MCL (ref 0–0.04)
IMM GRANULOCYTES NFR BLD AUTO: 0.4 %
KETONES UR QL STRIP: NEGATIVE
LEUKOCYTE ESTERASE UR QL STRIP: NEGATIVE
LYMPHOCYTES # BLD AUTO: 4.92 X10(3)/MCL (ref 0.6–4.6)
LYMPHOCYTES NFR BLD AUTO: 39.2 %
MAGNESIUM SERPL-MCNC: 2 MG/DL (ref 1.6–2.6)
MCH RBC QN AUTO: 26.1 PG (ref 27–31)
MCHC RBC AUTO-ENTMCNC: 31.3 G/DL (ref 33–36)
MCV RBC AUTO: 83.5 FL (ref 80–94)
MONOCYTES # BLD AUTO: 0.55 X10(3)/MCL (ref 0.1–1.3)
MONOCYTES NFR BLD AUTO: 4.4 %
NEUTROPHILS # BLD AUTO: 6.71 X10(3)/MCL (ref 2.1–9.2)
NEUTROPHILS NFR BLD AUTO: 53.5 %
NITRITE UR QL STRIP: NEGATIVE
NRBC BLD AUTO-RTO: 0 %
PH UR STRIP: 6.5 [PH]
PLATELET # BLD AUTO: 490 X10(3)/MCL (ref 130–400)
PMV BLD AUTO: 10 FL (ref 7.4–10.4)
POTASSIUM SERPL-SCNC: 4 MMOL/L (ref 3.5–5.1)
PROT SERPL-MCNC: 7.4 GM/DL (ref 6.4–8.3)
PROT UR QL STRIP: NEGATIVE
RBC # BLD AUTO: 4.36 X10(6)/MCL (ref 4.2–5.4)
SODIUM SERPL-SCNC: 139 MMOL/L (ref 136–145)
SP GR UR STRIP.AUTO: 1.01 (ref 1–1.03)
TROPONIN I SERPL-MCNC: <0.01 NG/ML (ref 0–0.04)
TSH SERPL-ACNC: 1.53 UIU/ML (ref 0.35–4.94)
UROBILINOGEN UR STRIP-ACNC: 0.2
WBC # BLD AUTO: 12.54 X10(3)/MCL (ref 4.5–11.5)

## 2025-04-14 PROCEDURE — 93010 ELECTROCARDIOGRAM REPORT: CPT | Mod: ,,, | Performed by: STUDENT IN AN ORGANIZED HEALTH CARE EDUCATION/TRAINING PROGRAM

## 2025-04-14 PROCEDURE — 81025 URINE PREGNANCY TEST: CPT | Performed by: EMERGENCY MEDICINE

## 2025-04-14 PROCEDURE — 96374 THER/PROPH/DIAG INJ IV PUSH: CPT

## 2025-04-14 PROCEDURE — 81003 URINALYSIS AUTO W/O SCOPE: CPT | Performed by: EMERGENCY MEDICINE

## 2025-04-14 PROCEDURE — 96361 HYDRATE IV INFUSION ADD-ON: CPT

## 2025-04-14 PROCEDURE — 85025 COMPLETE CBC W/AUTO DIFF WBC: CPT | Performed by: EMERGENCY MEDICINE

## 2025-04-14 PROCEDURE — 83735 ASSAY OF MAGNESIUM: CPT | Performed by: EMERGENCY MEDICINE

## 2025-04-14 PROCEDURE — 99285 EMERGENCY DEPT VISIT HI MDM: CPT | Mod: 25

## 2025-04-14 PROCEDURE — 80053 COMPREHEN METABOLIC PANEL: CPT | Performed by: EMERGENCY MEDICINE

## 2025-04-14 PROCEDURE — 84443 ASSAY THYROID STIM HORMONE: CPT | Performed by: EMERGENCY MEDICINE

## 2025-04-14 PROCEDURE — 63600175 PHARM REV CODE 636 W HCPCS: Performed by: EMERGENCY MEDICINE

## 2025-04-14 PROCEDURE — 93005 ELECTROCARDIOGRAM TRACING: CPT

## 2025-04-14 PROCEDURE — 84484 ASSAY OF TROPONIN QUANT: CPT | Performed by: EMERGENCY MEDICINE

## 2025-04-14 RX ORDER — KETOROLAC TROMETHAMINE 30 MG/ML
30 INJECTION, SOLUTION INTRAMUSCULAR; INTRAVENOUS
Status: COMPLETED | OUTPATIENT
Start: 2025-04-14 | End: 2025-04-14

## 2025-04-14 RX ADMIN — SODIUM CHLORIDE, POTASSIUM CHLORIDE, SODIUM LACTATE AND CALCIUM CHLORIDE 1000 ML: 600; 310; 30; 20 INJECTION, SOLUTION INTRAVENOUS at 01:04

## 2025-04-14 RX ADMIN — KETOROLAC TROMETHAMINE 30 MG: 30 INJECTION, SOLUTION INTRAMUSCULAR; INTRAVENOUS at 01:04

## 2025-04-14 NOTE — DISCHARGE SUMMARY
[facility] Hospital Medicine Discharge Summary    Admit Date: 4/4/2025  Discharge Date and Time: 4/7/2025, 07:21 pm  Admitting Physician:  Team  Discharging Physician: Ama Esparza MD.  Primary Care Physician: Mitzi Dow FNP  Consults: Infectious Disease and Neurology    Discharge Diagnoses:  Acute encephalopathy, unspecified type due to Syncope vs Seizure episode vs other etiology, POA  Moderate leukocytosis, POA- infection vs reactive. No infectious etiology identified   Headache, POA- improved   Abnormal Echocardiogram with positive bubble study, POA  Normocytic anemia / anemia of chronic disease   Metabolic acidosis /Lactic acidosis , POA- improved   Hypomagnesemia, moderate -POA  Elevated inflammatory markers, POA- rising   Obesity, BMI 29.8    Hospital Course:   30 yo female with a PMHx of anxiety/depression presented to [facility] via EMS on 4/4/2025 with c/o altered mental status that began on the morning of presentation.  On arrival, Pt was unable to give any history due to being altered. Initial history obtained from pt's mother who reported Pt developed generalized moderate intensity headache associated with nausea, some light intolerance and extreme fatigue previous night. Denies neck pain or neck stiffness. Symptoms persisted and therefore Pt went to an urgent care clinic around 9:00 am and about 15 minutes after being placed in the exam room,  she became altered with decreased responsiveness, nonverbal and urinary incontinence.  EMS reported patient remained nonverbal, but was moving all extremities and maintaining her airway. No history of seizure disorder in the past.      On arrival to the ED, Pt was afebrile but tachycardic with , bradypnea with RR 8, /81 and O2sat 100% on RA. Labs showed leukocytosis 19.8K, Hgb 11.5, Na 136, K 3.9, Bicarb 14, Cr 0.7, Mg 1.5, CRP 20, , Neg troponin, neg preg test, neg UDS, neg blood alcohol, neg  flu/covid/RSV, neg Resp PCR panel, UA without infection, lactic 3.4 then repeat normal. CT head without contrast negative for acute intracranial findings.  CXR negative for acute cardiopulmonary process. CTA chest degraded by patient motion and suboptimal contrast opacification however  no gross evidence of  PE, no acute infiltrates or effusions.  Pt was given IVF in the ED and admitted to  service. Neurology was consulted. MRI brain, EEG and LP ordered. LP is not available until Monday 4/7/25.      MRI brain on 4/4/25 degraded by motion artifact but neg for acute abnormality. Pt was evaluated by Neurology and underwent EEG on 4/5/25 and was reportedly normal. Additional workup include echocardiogram showed positive bubble study with possibility of intrapulmonary shunting. Cardiology was consulted and suggested HAYLEY as outpatient and follow up in the Structural Heart disease clinic and 2 weeks MCT monitoring.      Pt's mental status improved to baseline without specific intervention. Headaches have  improved. However course complicated by persistent moderate leukocytosis of 19K and rising CRP 20>92>127 without clear evidence of  infection. Procalcitonin 0.08. Blood cultures x 2 from admission neg x 48h. ID consult requested to assist. CT abd pelvis w/wo contrast was ordered per ID and was reportedly neg. Underwent LP on 4/7/25 showed no evidence of CNS infection with unremarkable cell count, neg ME panel, normal CSF protein and glucose , neg herpes, Lyme disease and other serologic test. Repeat labs as of 4/7/25 Leukocytosis started to trend down a swell as CRP. Decision was made to discharge Pt home on a course of oral Doxycyline and follow up with PCP as well as Neurology upon discharge. Pt will follow up with Cardiology at structural heart disease clinic.        Pt was seen and examined on the day of discharge  Vitals:  VITAL SIGNS: 24 HRS MIN & MAX LAST   No data recorded 98.9 °F (37.2 °C)   No data recorded  "110/61   No data recorded  81   No data recorded 18   No data recorded 98 %       Physical Exam:  General: In no acute distress, afebrile  Chest: Clear to auscultation bilaterally  Heart: RRR, +S1, S2, no appreciable murmur  Abdomen: Soft, nontender, BS +  MSK: Warm, no lower extremity edema, no clubbing or cyanosis  Neurologic: Alert and oriented x4, Cranial nerve II-XII intact, Strength 5/5 in all 4 extremities    Procedures Performed: No admission procedures for hospital encounter.     Significant Diagnostic Studies: See Full reports for all details    Recent Labs   Lab 04/07/25  0339   WBC 15.09*   RBC 4.17*   HGB 10.9*   HCT 35.0*   MCV 83.9   MCH 26.1*   MCHC 31.1*   RDW 13.6      MPV 10.5*       No results for input(s): "NA", "K", "CL", "CO2", "ANIONGAP", "BUN", "CREATININE", "GLU", "CALCIUM", "PH", "MG", "ALBUMIN", "PROT", "ALKPHOS", "ALT", "AST", "BILITOT" in the last 168 hours.     Microbiology Results (last 7 days)       Procedure Component Value Units Date/Time    Mycobacteria and Nocardia Culture [[id number]] Collected: 04/07/25 1515    Order Status: Sent Specimen: CSF (Spinal Fluid) from Cerebrospinal Fluid Updated: 04/07/25 1515    Cryptococcal antigen, CSF [[id number]]     Order Status: Canceled Specimen: CSF (Spinal Fluid) from CSF Tap, Tube 2     Cerebrospinal Fluid Culture [[id number]]     Order Status: Canceled Specimen: CSF (Spinal Fluid) from CSF Tap, Tube 1     AFB Smear [[id number]]     Order Status: Canceled Specimen: CSF (Spinal Fluid) from Cerebrospinal Fluid     Mycobacteria and Nocardia Culture [[id number]]     Order Status: Canceled Specimen: CSF (Spinal Fluid) from Cerebrospinal Fluid              CV Ultrasound Bilateral Doppler Carotid  The right internal carotid artery is patent with no evidence of stenosis.  The left internal carotid artery is patent with no evidence of stenosis.  Bilateral vertebral arteries are patent with antegrade flow.         Medication List        STOP " taking these medications      hydrOXYzine pamoate 25 MG Cap  Commonly known as: VistariL     metroNIDAZOLE 1.3 % (65 mg/5 gram) Gel  Commonly known as: NUVESSA     norelgestromin-ethinyl estradiol 150-35 mcg/24 hr            ASK your doctor about these medications      doxycycline 100 MG tablet  Commonly known as: VIBRA-TABS  Take 1 tablet (100 mg total) by mouth every 12 (twelve) hours. for 5 days  Ask about: Should I take this medication?               Where to Get Your Medications        These medications were sent to [id number] [place] [address]  [address] [place] [address]      Phone: [phone]   doxycycline 100 MG tablet          Explained in detail to the patient about the discharge plan, medications, and follow-up visits. Pt understands and agrees with the treatment plan  Discharge Disposition: Home or Self Care   Discharged Condition: stable  Diet-    Medications Per DC med rec  Activities as tolerated   Follow-up Information       Nereida Cortez MD Follow up in 1 week(s).    Specialties: Cardiovascular Disease, Cardiology  Why: 2 Week MCT for Diagnosis of AMS, Results on F/U with Dr. Cortez  Contact information:  [address] [place] [address] [phone]               Mitzi Dow FNP. Schedule an appointment as soon as possible for a visit in 3 day(s).    Specialty: Family Medicine  Why: Hospital discharge follow up and repeat labs - CBC, CRP  Contact information:  [facility] [facility]  [address]  [place] [address] [phone]               Ny Cummings MD. Schedule an appointment as soon as possible for a visit in 1 week(s).    Specialty: Neurology  Why: Neurology hospital discharge follow up  Contact information:  [address] [place] [address] [address]  [address]  [facility] [address] [phone]                           For further questions contact hospitalist office    Discharge time 33 minutes    For worsening symptoms, chest pain,  shortness of breath, increased abdominal pain, high grade fever, stroke or stroke like symptoms, immediately go to the nearest Emergency Room or call 911 as soon as possible.      Ama Reyes M.D, on 4/7/2025, 07:21 pm

## 2025-04-14 NOTE — ED PROVIDER NOTES
Encounter Date: 2025       History     Chief Complaint   Patient presents with    Dizziness     Pt c/o feeling dizzy, light headed, headache and some sob onset today. States had same s/s 10 days ago just prior to passing out. Pt current has heart monitor on.     The history is provided by the patient.   Dizziness  This is a recurrent problem. The current episode started less than 1 hour ago. The problem occurs constantly. The problem has not changed since onset.Associated symptoms include headaches. Pertinent negatives include no chest pain and no shortness of breath. Nothing aggravates the symptoms. Nothing relieves the symptoms.   Patient states she is feeling like she did shortly before her hospitalization last week, when she presented with acute mental status change.  Exhaustive work-up failed to reveal etiology and she slowly returned back to baseline (MRI, EEG, LP, cardiac monitoring).  Currently wearing a Holter.  Mild headache.  Denies N/V.    Review of patient's allergies indicates:  No Known Allergies  Past Medical History:   Diagnosis Date    Depression      Past Surgical History:   Procedure Laterality Date    ABCESS DRAINAGE       SECTION       Family History   Problem Relation Name Age of Onset    Atrial fibrillation Mother      Arrhythmia Mother      Liver cancer Maternal Grandfather      Lung cancer Paternal Grandmother       Social History[1]  Review of Systems   Constitutional:  Negative for fever.   HENT:  Negative for sore throat.    Respiratory:  Negative for shortness of breath.    Cardiovascular:  Negative for chest pain.   Gastrointestinal:  Negative for nausea.   Genitourinary:  Negative for dysuria.   Musculoskeletal:  Negative for back pain.   Skin:  Negative for rash.   Neurological:  Positive for dizziness and headaches. Negative for weakness.   Hematological:  Does not bruise/bleed easily.       Physical Exam     Initial Vitals [25 1304]   BP Pulse Resp Temp SpO2    116/85 92 20 97.9 °F (36.6 °C) 97 %      MAP       --         Physical Exam    Nursing note and vitals reviewed.  Constitutional: She appears well-developed and well-nourished.   HENT:   Head: Normocephalic and atraumatic.   Right Ear: External ear normal.   Left Ear: External ear normal.   Eyes: Conjunctivae and EOM are normal. Pupils are equal, round, and reactive to light.   Neck: Neck supple.   Normal range of motion.  Cardiovascular:  Normal rate, regular rhythm, normal heart sounds and intact distal pulses.           Pulmonary/Chest: Breath sounds normal.   Abdominal: Abdomen is soft. Bowel sounds are normal.   Musculoskeletal:         General: Normal range of motion.      Cervical back: Normal range of motion and neck supple.     Neurological: She is alert and oriented to person, place, and time. GCS score is 15. GCS eye subscore is 4. GCS verbal subscore is 5. GCS motor subscore is 6.   Skin: Skin is warm and dry. Capillary refill takes less than 2 seconds.   Psychiatric: She has a normal mood and affect. Her behavior is normal. Judgment and thought content normal.         ED Course   Procedures  Labs Reviewed   COMPREHENSIVE METABOLIC PANEL - Abnormal       Result Value    Sodium 139      Potassium 4.0      Chloride 107      CO2 23      Glucose 113 (*)     Blood Urea Nitrogen 10.4      Creatinine 0.76      Calcium 9.3      Protein Total 7.4      Albumin 3.9      Globulin 3.5      Albumin/Globulin Ratio 1.1      Bilirubin Total 0.2      ALP 56      ALT 17      AST 15      eGFR >60      Anion Gap 9.0      BUN/Creatinine Ratio 14     CBC WITH DIFFERENTIAL - Abnormal    WBC 12.54 (*)     RBC 4.36      Hgb 11.4 (*)     Hct 36.4 (*)     MCV 83.5      MCH 26.1 (*)     MCHC 31.3 (*)     RDW 13.7      Platelet 490 (*)     MPV 10.0      Neut % 53.5      Lymph % 39.2      Mono % 4.4      Eos % 2.0      Basophil % 0.5      Imm Grans % 0.4      Neut # 6.71      Lymph # 4.92 (*)     Mono # 0.55      Eos # 0.25       Baso # 0.06      Imm Gran # 0.05 (*)     NRBC% 0.0     MAGNESIUM - Normal    Magnesium Level 2.00     TROPONIN I - Normal    Troponin-I <0.010     TSH - Normal    TSH 1.535     URINALYSIS, REFLEX TO URINE CULTURE - Normal    Color, UA Straw      Appearance, UA Clear      Specific Gravity, UA 1.015      pH, UA 6.5      Protein, UA Negative      Glucose, UA Negative      Ketones, UA Negative      Blood, UA Negative      Bilirubin, UA Negative      Urobilinogen, UA 0.2      Nitrites, UA Negative      Leukocyte Esterase, UA Negative     PREGNANCY TEST, URINE RAPID - Normal    hCG Qualitative, Urine Negative     CBC W/ AUTO DIFFERENTIAL    Narrative:     The following orders were created for panel order CBC auto differential.  Procedure                               Abnormality         Status                     ---------                               -----------         ------                     CBC with Differential[[id number]]       Abnormal            Final result                 Please view results for these tests on the individual orders.     EKG Readings: (Independently Interpreted)   Initial Reading: No STEMI. Rhythm: Normal Sinus Rhythm. Heart Rate: 83. Ectopy: No Ectopy. Conduction: Normal. ST Segments: Normal ST Segments. T Waves: Normal. Axis: Normal. Clinical Impression: Normal Sinus Rhythm     ECG Results              EKG 12-lead (In process)        Collection Time Result Time QRS Duration OHS QTC Calculation    04/14/25 13:11:14 04/14/25 13:19:05 80 415                     In process by Interface, Lab In [facility] (04/14/25 13:19:13)                   Narrative:    Test Reason : R42,    Vent. Rate :  83 BPM     Atrial Rate :  83 BPM     P-R Int : 118 ms          QRS Dur :  80 ms      QT Int : 354 ms       P-R-T Axes :  32  75  67 degrees    QTcB Int : 415 ms    Normal sinus rhythm  Normal ECG  When compared with ECG of 15-Feb-2023 14:27,  Vent. rate has decreased by  41 bpm    Referred By: AAAREFERRAL SELF            Confirmed By:                                   Imaging Results    None          Medications   lactated ringers bolus 1,000 mL (1,000 mLs Intravenous New Bag 4/14/25 1329)   ketorolac injection 30 mg (30 mg Intravenous Given 4/14/25 1330)     Medical Decision Making  Amount and/or Complexity of Data Reviewed  External Data Reviewed: notes.     Details: Excerpt from recent D/C Summary:    Discharge Diagnoses:  Acute encephalopathy, unspecified type due to Syncope vs Seizure episode vs other etiology, POA  Moderate leukocytosis, POA- infection vs reactive. No infectious etiology identified   Headache, POA- improved   Abnormal Echocardiogram with positive bubble study, POA  Normocytic anemia / anemia of chronic disease   Metabolic acidosis /Lactic acidosis , POA- improved   Hypomagnesemia, moderate -POA  Elevated inflammatory markers, POA- rising   Obesity, BMI 29.8     Hospital Course:   28 yo female with a PMHx of anxiety/depression presented to [facility] via EMS on 4/4/2025 with c/o altered mental status that began on the morning of presentation.  On arrival, Pt was unable to give any history due to being altered. Initial history obtained from pt's mother who reported Pt developed generalized moderate intensity headache associated with nausea, some light intolerance and extreme fatigue previous night. Denies neck pain or neck stiffness. Symptoms persisted and therefore Pt went to an urgent care clinic around 9:00 am and about 15 minutes after being placed in the exam room,  she became altered with decreased responsiveness, nonverbal and urinary incontinence.  EMS reported patient remained nonverbal, but was moving all extremities and maintaining her airway. No history of seizure disorder in the past.      On arrival to the ED, Pt was afebrile but tachycardic with , bradypnea with RR 8, /81 and O2sat 100% on RA. Labs showed leukocytosis 19.8K, Hgb 11.5, Na 136, K 3.9, Bicarb 14, Cr 0.7, Mg 1.5, CRP  20, , Neg troponin, neg preg test, neg UDS, neg blood alcohol, neg flu/covid/RSV, neg Resp PCR panel, UA without infection, lactic 3.4 then repeat normal. CT head without contrast negative for acute intracranial findings.  CXR negative for acute cardiopulmonary process. CTA chest degraded by patient motion and suboptimal contrast opacification however  no gross evidence of  PE, no acute infiltrates or effusions.  Pt was given IVF in the ED and admitted to  service. Neurology was consulted. MRI brain, EEG and LP ordered. LP is not available until Monday 4/7/25.      MRI brain on 4/4/25 degraded by motion artifact but neg for acute abnormality. Pt was evaluated by Neurology and underwent EEG on 4/5/25 and was reportedly normal. Additional workup include echocardiogram showed positive bubble study with possibility of intrapulmonary shunting. Cardiology was consulted and suggested HAYLEY as outpatient and follow up in the Structural Heart disease clinic and 2 weeks MCT monitoring.      Pt's mental status improved to baseline without specific intervention. Headaches have  improved. However course complicated by persistent moderate leukocytosis of 19K and rising CRP 20>92>127 without clear evidence of  infection. Procalcitonin 0.08. Blood cultures x 2 from admission neg x 48h. ID consult requested to assist. CT abd pelvis w/wo contrast was ordered per ID and was reportedly neg. Underwent LP on 4/7/25 showed no evidence of CNS infection with unremarkable cell count, neg ME panel, normal CSF protein and glucose , neg herpes, Lyme disease and other serologic test. Repeat labs as of 4/7/25 Leukocytosis started to trend down a swell as CRP. Decision was made to discharge Pt home on a course of oral Doxycyline and follow up with PCP as well as Neurology upon discharge. Pt will follow up with Cardiology at structural heart disease clinic.          Old labs reviewed - leukocytosis is chronic and dates back to at least  2021  Labs: ordered. Decision-making details documented in ED Course.  ECG/medicine tests: ordered and independent interpretation performed. Decision-making details documented in ED Course.    Risk  Prescription drug management.    Differential includes:  seizure, near-syncope, autonomic dysfunction, POTS, anemia, psychogenic, electrolyte disturbance.  Will obtain CBC, CMP, mag, troponin, TSH, UA, UPT, EKG and give IVF bolus and ketorolac for HA.                                  Clinical Impression:  Final diagnoses:  [R42] Dizziness  [R42] Lightheadedness (Primary)          ED Disposition Condition    Discharge Stable          ED Prescriptions    None       Follow-up Information       Follow up With Specialties Details Why Contact Info    Mitzi Dow, FNP Family Medicine Schedule an appointment as soon as possible for a visit   [facility] [facility] [address]  [phone]                   [1]   Social History  Tobacco Use    Smoking status: Never    Smokeless tobacco: Never   Substance Use Topics    Alcohol use: Not Currently     Comment: on occas    Drug use: Never        Parish Olson MD  04/14/25 8451

## 2025-04-15 LAB
OHS QRS DURATION: 80 MS
OHS QTC CALCULATION: 415 MS

## 2025-04-17 LAB
AMPAR2 IGG CSF QL CBA IFA: NEGATIVE
AMPHIPHYSIN AB CSF QL IF: NEGATIVE
ANNOTATION COMMENT IMP: NORMAL
CASPR2 IGG CSF QL CBA IFA: NEGATIVE
CV2 AB CSF QL IF: NEGATIVE
DPPX IGG CSF QL CBA IFA: NEGATIVE
GABABR IGG CSF QL CBA IFA: NEGATIVE
GAD65 AB CSF-SCNC: 0 NMOL/L
GFAP ALPHA IGG CSF QL IF: NEGATIVE
GLIAL NUC TYPE 1 AB CSF QL IF: NEGATIVE
HU1 AB CSF QL IF: NEGATIVE
HU2 AB CSF QL IF: NEGATIVE
HU3 AB CSF QL IF: NEGATIVE
IGLON5 IGG CSF QL CBA IFA: NEGATIVE
IMMUNOLOGIST REVIEW: NORMAL
LGI1 IGG CSF QL CBA IFA: NEGATIVE
M MGLUR1 AB IFA, CSF: NEGATIVE
M NEUROCHONDRIN IFA, CSF: NEGATIVE
M SEPTIN-7 IFA, CSF: NEGATIVE
NIF IGG CSF QL IF: NEGATIVE
NMDAR1 IGG CSF QL CBA IFA: NEGATIVE
PCA-1 AB CSF QL IF: NEGATIVE
PCA-2 AB CSF QL IF: NEGATIVE
PCA-TR AB CSF QL IF: NEGATIVE
PDE10A AB IFA, CSF: NEGATIVE
TRIM46 AB IFA, CSF: NEGATIVE

## 2025-04-19 ENCOUNTER — HOSPITAL ENCOUNTER (EMERGENCY)
Facility: HOSPITAL | Age: 30
Discharge: HOME | End: 2025-04-19
Attending: STUDENT IN AN ORGANIZED HEALTH CARE EDUCATION/TRAINING PROGRAM
Payer: COMMERCIAL

## 2025-04-19 VITALS
WEIGHT: 170 LBS | DIASTOLIC BLOOD PRESSURE: 84 MMHG | HEIGHT: 62 IN | SYSTOLIC BLOOD PRESSURE: 125 MMHG | OXYGEN SATURATION: 99 % | HEART RATE: 102 BPM | RESPIRATION RATE: 16 BRPM | BODY MASS INDEX: 31.28 KG/M2 | TEMPERATURE: 98 F

## 2025-04-19 DIAGNOSIS — G43.809 OTHER MIGRAINE WITHOUT STATUS MIGRAINOSUS, NOT INTRACTABLE: Primary | ICD-10-CM

## 2025-04-19 DIAGNOSIS — R42 DIZZINESS: ICD-10-CM

## 2025-04-19 LAB
ABS NEUT CALC (OHS): 11.87 X10(3)/MCL (ref 2.1–9.2)
ACCEPTIBLE SP GR UR QL: 1.01 (ref 1–1.03)
ALBUMIN SERPL-MCNC: 4.1 G/DL (ref 3.5–5)
ALBUMIN/GLOB SERPL: 1.1 RATIO (ref 1.1–2)
ALP SERPL-CCNC: 59 UNIT/L (ref 40–150)
ALT SERPL-CCNC: 12 UNIT/L (ref 0–55)
AMPHET UR QL SCN: NEGATIVE
ANION GAP SERPL CALC-SCNC: 9 MEQ/L
AST SERPL-CCNC: 13 UNIT/L (ref 11–45)
B-HCG UR QL: NEGATIVE
BARBITURATE SCN PRESENT UR: NEGATIVE
BENZODIAZ UR QL SCN: NEGATIVE
BILIRUB SERPL-MCNC: 0.3 MG/DL
BILIRUB UR QL STRIP.AUTO: NEGATIVE
BUN SERPL-MCNC: 11.6 MG/DL (ref 7–18.7)
CALCIUM SERPL-MCNC: 9.6 MG/DL (ref 8.4–10.2)
CANNABINOIDS UR QL SCN: NEGATIVE
CHLORIDE SERPL-SCNC: 109 MMOL/L (ref 98–107)
CLARITY UR: CLEAR
CO2 SERPL-SCNC: 22 MMOL/L (ref 22–29)
COCAINE UR QL SCN: NEGATIVE
COLOR UR AUTO: NORMAL
CREAT SERPL-MCNC: 0.73 MG/DL (ref 0.55–1.02)
CREAT/UREA NIT SERPL: 16
ERYTHROCYTE [DISTWIDTH] IN BLOOD BY AUTOMATED COUNT: 13.8 % (ref 11.5–17)
FENTANYL UR QL SCN: NEGATIVE
GFR SERPLBLD CREATININE-BSD FMLA CKD-EPI: >60 ML/MIN/1.73/M2
GLOBULIN SER-MCNC: 3.8 GM/DL (ref 2.4–3.5)
GLUCOSE SERPL-MCNC: 105 MG/DL (ref 74–100)
GLUCOSE UR QL STRIP: NEGATIVE
HCT VFR BLD AUTO: 36.5 % (ref 37–47)
HGB BLD-MCNC: 11.6 G/DL (ref 12–16)
HGB UR QL STRIP: NEGATIVE
KETONES UR QL STRIP: NEGATIVE
LACTATE SERPL-SCNC: 0.8 MMOL/L (ref 0.5–2.2)
LEUKOCYTE ESTERASE UR QL STRIP: NEGATIVE
LYMPHOCYTES NFR BLD MANUAL: 21 % (ref 13–40)
LYMPHOCYTES NFR BLD MANUAL: 3.46 X10(3)/MCL (ref 0.6–4.6)
MCH RBC QN AUTO: 26.1 PG (ref 27–31)
MCHC RBC AUTO-ENTMCNC: 31.8 G/DL (ref 33–36)
MCV RBC AUTO: 82.2 FL (ref 80–94)
MDMA UR QL SCN: NEGATIVE
MONOCYTES NFR BLD MANUAL: 1.15 X10(3)/MCL (ref 0.1–1.3)
MONOCYTES NFR BLD MANUAL: 7 % (ref 2–11)
NEUTROPHILS NFR BLD MANUAL: 72 % (ref 47–80)
NITRITE UR QL STRIP: NEGATIVE
NRBC BLD AUTO-RTO: 0 %
OHS QRS DURATION: 84 MS
OHS QTC CALCULATION: 415 MS
OPIATES UR QL SCN: NEGATIVE
PCP UR QL: NEGATIVE
PH UR STRIP: 7.5 [PH]
PH UR: 7.5 [PH] (ref 3–11)
PLATELET # BLD AUTO: 412 X10(3)/MCL (ref 130–400)
PLATELET # BLD EST: ABNORMAL 10*3/UL
PMV BLD AUTO: 10.3 FL (ref 7.4–10.4)
POTASSIUM SERPL-SCNC: 3.6 MMOL/L (ref 3.5–5.1)
PROT SERPL-MCNC: 7.9 GM/DL (ref 6.4–8.3)
PROT UR QL STRIP: NEGATIVE
RBC # BLD AUTO: 4.44 X10(6)/MCL (ref 4.2–5.4)
RBC MORPH BLD: NORMAL
SODIUM SERPL-SCNC: 140 MMOL/L (ref 136–145)
SP GR UR STRIP.AUTO: 1.01 (ref 1–1.03)
TROPONIN I SERPL-MCNC: <0.01 NG/ML (ref 0–0.04)
TSH SERPL-ACNC: 1.07 UIU/ML (ref 0.35–4.94)
UROBILINOGEN UR STRIP-ACNC: 0.2
WBC # BLD AUTO: 16.49 X10(3)/MCL (ref 4.5–11.5)

## 2025-04-19 PROCEDURE — 84484 ASSAY OF TROPONIN QUANT: CPT | Performed by: STUDENT IN AN ORGANIZED HEALTH CARE EDUCATION/TRAINING PROGRAM

## 2025-04-19 PROCEDURE — 85025 COMPLETE CBC W/AUTO DIFF WBC: CPT | Performed by: STUDENT IN AN ORGANIZED HEALTH CARE EDUCATION/TRAINING PROGRAM

## 2025-04-19 PROCEDURE — 87040 BLOOD CULTURE FOR BACTERIA: CPT | Performed by: STUDENT IN AN ORGANIZED HEALTH CARE EDUCATION/TRAINING PROGRAM

## 2025-04-19 PROCEDURE — 83605 ASSAY OF LACTIC ACID: CPT | Performed by: STUDENT IN AN ORGANIZED HEALTH CARE EDUCATION/TRAINING PROGRAM

## 2025-04-19 PROCEDURE — 84443 ASSAY THYROID STIM HORMONE: CPT | Performed by: STUDENT IN AN ORGANIZED HEALTH CARE EDUCATION/TRAINING PROGRAM

## 2025-04-19 PROCEDURE — 99284 EMERGENCY DEPT VISIT MOD MDM: CPT | Mod: 25

## 2025-04-19 PROCEDURE — 93005 ELECTROCARDIOGRAM TRACING: CPT

## 2025-04-19 PROCEDURE — 93010 ELECTROCARDIOGRAM REPORT: CPT | Mod: ,,, | Performed by: STUDENT IN AN ORGANIZED HEALTH CARE EDUCATION/TRAINING PROGRAM

## 2025-04-19 PROCEDURE — 80053 COMPREHEN METABOLIC PANEL: CPT | Performed by: STUDENT IN AN ORGANIZED HEALTH CARE EDUCATION/TRAINING PROGRAM

## 2025-04-19 PROCEDURE — 80307 DRUG TEST PRSMV CHEM ANLYZR: CPT | Performed by: STUDENT IN AN ORGANIZED HEALTH CARE EDUCATION/TRAINING PROGRAM

## 2025-04-19 PROCEDURE — 81003 URINALYSIS AUTO W/O SCOPE: CPT | Performed by: STUDENT IN AN ORGANIZED HEALTH CARE EDUCATION/TRAINING PROGRAM

## 2025-04-19 PROCEDURE — 81025 URINE PREGNANCY TEST: CPT | Performed by: STUDENT IN AN ORGANIZED HEALTH CARE EDUCATION/TRAINING PROGRAM

## 2025-04-19 NOTE — ED PROVIDER NOTES
Encounter Date: 2025       History     Chief Complaint   Patient presents with    Headache     Headache since last night; states that she has been seen twice in the past 2 weeks for headaches, dizziness; pt concerned she might pass out like she did 2 weeks ago; neuro intact, steady gait     HPI    29-year-old female with a past medical history of migraines and depression presents emergency department for headache since last night.  Patient states she has been having intermittent headaches last 2 weeks.  She has been having intermittent dizziness but no current dizziness.  She is scared she might pass out like she did 2 weeks ago which turned into a admission with a very detailed workup.  She has an appointment pending for cardiology and neurology.  States the headache is improved and very tolerable at this time.  Denies any other complaints.  No fever.  No vision changes.  No numbness or weakness.  Patient states that when she got the headache she got anxious she did have some mild shortness of breath which resolved.    Review of patient's allergies indicates:  No Known Allergies  Past Medical History:   Diagnosis Date    Depression     Migraine headache      Past Surgical History:   Procedure Laterality Date    ABCESS DRAINAGE       SECTION       Family History   Problem Relation Name Age of Onset    Atrial fibrillation Mother      Arrhythmia Mother      Liver cancer Maternal Grandfather      Lung cancer Paternal Grandmother       Social History[1]  Review of Systems   Constitutional:  Negative for fever.   Respiratory:  Negative for cough and shortness of breath.    Cardiovascular:  Negative for chest pain.   Gastrointestinal:  Negative for abdominal pain, constipation, diarrhea, nausea and vomiting.   Neurological:  Positive for headaches. Negative for dizziness.   All other systems reviewed and are negative.      Physical Exam     Initial Vitals [25 0815]   BP Pulse Resp Temp SpO2   125/84 97  18 97.9 °F (36.6 °C) 99 %      MAP       --         Physical Exam    Nursing note and vitals reviewed.  Constitutional: She appears well-developed and well-nourished. No distress.   Cardiovascular:  Normal rate and regular rhythm.           Pulmonary/Chest: Breath sounds normal. No respiratory distress. She has no wheezes. She has no rhonchi. She has no rales.   Abdominal: Abdomen is soft. There is no abdominal tenderness. There is no rebound and no guarding.   Musculoskeletal:         General: No tenderness. Normal range of motion.     Neurological: She is alert and oriented to person, place, and time. She has normal strength.   Mental status   Alert and attentive   Speech clear and fluent with normal comprehension   Able to provide clear account of historical and recent events   Oriented to person place and time   Able to follow 3 commands sequence (right thumb, touch left ear, stick out tongue)    Cranial nerves   2. Pupils equal round reactive to light bilaterally, right eye:  No visual defects, 4 quadrant my right finger confirmation.  Left eye:  No visual defects, 4 quadrant by finger confirmation   3. Bilateral eye adduction and elevation without diplopia    4. Bilateral eye adduction and depression without diplopia   6. Bilateral eye abduction with diplopia  5. Masseter muscle normal bulk and jaw opens symmetrically   7. Face symmetric during speech.  Wrinkle forehead and smile symmetrically intact, bilaterally   8.  Response to verbal stimulus   9. Normal speech without breathlessness or hoarseness   10. Normal speech without breathlessness or hoarseness  12. Tongue protrudes midline    Motor exam   Upper extremities: No pronator drift   Lower extremities:  Bilateral leg lift off bed to resistance, symmetric 5/5   Coordination:  No nystagmus, no saccadic pursuit on eye range of motion    Gait:  Normal gait (standing, toe to heal, and tandem)  Sensation: Deferred     Skin: Skin is warm. Capillary refill  takes less than 2 seconds.         ED Course   Procedures  Labs Reviewed   COMPREHENSIVE METABOLIC PANEL - Abnormal       Result Value    Sodium 140      Potassium 3.6      Chloride 109 (*)     CO2 22      Glucose 105 (*)     Blood Urea Nitrogen 11.6      Creatinine 0.73      Calcium 9.6      Protein Total 7.9      Albumin 4.1      Globulin 3.8 (*)     Albumin/Globulin Ratio 1.1      Bilirubin Total 0.3      ALP 59      ALT 12      AST 13      eGFR >60      Anion Gap 9.0      BUN/Creatinine Ratio 16     CBC WITH DIFFERENTIAL - Abnormal    WBC 16.49 (*)     RBC 4.44      Hgb 11.6 (*)     Hct 36.5 (*)     MCV 82.2      MCH 26.1 (*)     MCHC 31.8 (*)     RDW 13.8      Platelet 412 (*)     MPV 10.3      NRBC% 0.0     MANUAL DIFFERENTIAL - Abnormal    Neutrophils % 72      Lymphs % 21      Monocytes % 7      Neutrophils Abs Calc 11.8728 (*)     Lymphs Abs 3.4629      Monocytes Abs 1.1543      Platelets Increased (*)     RBC Morph Normal     TROPONIN I - Normal    Troponin-I <0.010     URINALYSIS, REFLEX TO URINE CULTURE - Normal    Color, UA Straw      Appearance, UA Clear      Specific Gravity, UA 1.015      pH, UA 7.5      Protein, UA Negative      Glucose, UA Negative      Ketones, UA Negative      Blood, UA Negative      Bilirubin, UA Negative      Urobilinogen, UA 0.2      Nitrites, UA Negative      Leukocyte Esterase, UA Negative     PREGNANCY TEST, URINE RAPID - Normal    hCG Qualitative, Urine Negative     DRUG SCREEN, URINE (BEAKER) - Normal    Amphetamines, Urine Negative      Barbiturates, Urine Negative      Benzodiazepine, Urine Negative      Cannabinoids, Urine Negative      Cocaine, Urine Negative      Fentanyl, Urine Negative      MDMA, Urine Negative      Opiates, Urine Negative      Phencyclidine, Urine Negative      pH, Urine 7.5      Specific Gravity, Urine Auto 1.015      Narrative:     Cut off concentrations:    Amphetamines - 1000 ng/ml  Barbiturates - 200 ng/ml  Benzodiazepine - 200  ng/ml  Cannabinoids (THC) - 50 ng/ml  Cocaine - 300 ng/ml  Fentanyl - 1.0 ng/ml  MDMA - 500 ng/ml  Opiates - 300 ng/ml   Phencyclidine (PCP) - 25 ng/ml    Specimen submitted for drug analysis and tested for pH and specific gravity in order to evaluate sample integrity. Suspect tampering if specific gravity is <1.003 and/or pH is not within the range of 4.5 - 8.0  False negatives may result form substances such as bleach added to urine.  False positives may result for the presence of a substance with similar chemical structure to the drug or its metabolite.    This test provides only a PRELIMINARY analytical test result. A more specific alternate chemical method must be used in order to obtain a confirmed analytical result. Gas chromatography/mass spectrometry (GC/MS) is the preferred confirmatory method. Other chemical confirmation methods are available. Clinical consideration and professional judgement should be applied to any drug of abuse test result, particularly when preliminary positive results are used.    Positive results will be confirmed only at the physicians request. Unconfirmed screening results are to be used only for medical purposes (treatment).        TSH - Normal    TSH 1.075     BLOOD CULTURE [facility]   BLOOD CULTURE [facility]   CBC W/ AUTO DIFFERENTIAL    Narrative:     The following orders were created for panel order CBC auto differential.  Procedure                               Abnormality         Status                     ---------                               -----------         ------                     CBC with Differential[[id number]]       Abnormal            Final result               Manual Differential[[id number]]         Abnormal            Final result                 Please view results for these tests on the individual orders.   LACTIC ACID, PLASMA     EKG Readings: (Independently Interpreted)   Initial Reading: No STEMI. Rhythm: Normal Sinus Rhythm. Heart Rate: 82. Ectopy: No Ectopy.  Conduction: Normal. ST Segments: Normal ST Segments. T Waves: Normal. Clinical Impression: Normal Sinus Rhythm       Imaging Results    None          Medications - No data to display  Medical Decision Making  Initial Assessment:       Migraine      Differential Diagnosis:   Judging by the patient's chief complaint and pertinent history, the patient has the following possible differential diagnoses, including but not limited to the following.  Some of these are deemed to be lower likelihood and some more likely based on my physical exam and history combined with possible lab work and/or imaging studies.   Please see the pertinent studies, and refer to the HPI.  Some of these diagnoses will take further evaluation to fully rule out, perhaps as an outpatient and the patient was encouraged to follow up when discharged for more comprehensive evaluation.      Migraine, complex migraine, focal seizures, seizures, meningitis, encephalitis, bacteremia,  as well as multiple other possible etiologies      Headache improved on its own.  She has Fioricet at home to take as needed did not take any.  Very detailed workup done within the last 2 weeks.  No indication for any repeat of this.  Will obtain blood cultures just because she has a 16,000 white count which is on explainable.  No fevers.    Problems Addressed:  Other migraine without status migrainosus, not intractable: chronic illness or injury    Amount and/or Complexity of Data Reviewed  External Data Reviewed: labs, radiology and notes.     Details: Patient extremely detailed workup within the last 2 weeks.  She had a CT of the head, MRI of the head, EEG, LP, CTA of the chest.  All this is negative.  She is pending referrals.  Labs: ordered.  ECG/medicine tests: ordered and independent interpretation performed.                                      Clinical Impression:  Final diagnoses:  [R42] Dizziness  [G43.809] Other migraine without status migrainosus, not intractable  (Primary)          ED Disposition Condition    Discharge Stable          ED Prescriptions    None       Follow-up Information       Follow up With Specialties Details Why Contact Info    Mitzi Dow, FNP Family Medicine Schedule an appointment as soon as possible for a visit   [address]  [address]  [facility] [id number] [phone]      [facility] - Emergency Dept Emergency Medicine Go to  If symptoms worsen [address]  [facility] [address] [phone]               [1]   Social History  Tobacco Use    Smoking status: Never    Smokeless tobacco: Never   Substance Use Topics    Alcohol use: Not Currently     Comment: on occas    Drug use: Never        Fuad Tse MD  04/19/25 0908

## 2025-04-19 NOTE — ED NOTES
Pt lying in bed with no distress noted. Blood and urine collected. Pt update on POC. All questions answered.

## 2025-04-23 ENCOUNTER — OFFICE VISIT (OUTPATIENT)
Dept: HEMATOLOGY/ONCOLOGY | Facility: CLINIC | Age: 30
End: 2025-04-23
Payer: COMMERCIAL

## 2025-04-23 VITALS
WEIGHT: 173.63 LBS | RESPIRATION RATE: 14 BRPM | OXYGEN SATURATION: 98 % | HEART RATE: 82 BPM | BODY MASS INDEX: 31.95 KG/M2 | HEIGHT: 62 IN | DIASTOLIC BLOOD PRESSURE: 77 MMHG | SYSTOLIC BLOOD PRESSURE: 112 MMHG | TEMPERATURE: 99 F

## 2025-04-23 DIAGNOSIS — D72.829 LEUKOCYTOSIS, UNSPECIFIED TYPE: ICD-10-CM

## 2025-04-23 DIAGNOSIS — L73.2 HIDRADENITIS SUPPURATIVA OF MULTIPLE SITES: Primary | ICD-10-CM

## 2025-04-23 DIAGNOSIS — E66.01 MORBID OBESITY: ICD-10-CM

## 2025-04-23 DIAGNOSIS — D50.0 IRON DEFICIENCY ANEMIA DUE TO CHRONIC BLOOD LOSS: ICD-10-CM

## 2025-04-23 LAB
BASOPHILS # BLD AUTO: 0.05 X10(3)/MCL
BASOPHILS NFR BLD AUTO: 0.4 %
EOSINOPHIL # BLD AUTO: 0.12 X10(3)/MCL (ref 0–0.9)
EOSINOPHIL NFR BLD AUTO: 1 %
ERYTHROCYTE [DISTWIDTH] IN BLOOD BY AUTOMATED COUNT: 13.6 % (ref 11.5–17)
HCT VFR BLD AUTO: 37.7 % (ref 37–47)
HGB BLD-MCNC: 11.6 G/DL (ref 12–16)
IMM GRANULOCYTES # BLD AUTO: 0.01 X10(3)/MCL (ref 0–0.04)
IMM GRANULOCYTES NFR BLD AUTO: 0.1 %
LYMPHOCYTES # BLD AUTO: 3.9 X10(3)/MCL (ref 0.6–4.6)
LYMPHOCYTES NFR BLD AUTO: 32.8 %
MCH RBC QN AUTO: 26.1 PG (ref 27–31)
MCHC RBC AUTO-ENTMCNC: 30.8 G/DL (ref 33–36)
MCV RBC AUTO: 84.7 FL (ref 80–94)
MONOCYTES # BLD AUTO: 0.59 X10(3)/MCL (ref 0.1–1.3)
MONOCYTES NFR BLD AUTO: 5 %
NEUTROPHILS # BLD AUTO: 7.22 X10(3)/MCL (ref 2.1–9.2)
NEUTROPHILS NFR BLD AUTO: 60.7 %
PLATELET # BLD AUTO: 405 X10(3)/MCL (ref 130–400)
PMV BLD AUTO: 10.3 FL (ref 7.4–10.4)
RBC # BLD AUTO: 4.45 X10(6)/MCL (ref 4.2–5.4)
WBC # BLD AUTO: 11.89 X10(3)/MCL (ref 4.5–11.5)

## 2025-04-23 PROCEDURE — 3008F BODY MASS INDEX DOCD: CPT | Mod: CPTII,S$GLB,, | Performed by: INTERNAL MEDICINE

## 2025-04-23 PROCEDURE — 3044F HG A1C LEVEL LT 7.0%: CPT | Mod: CPTII,S$GLB,, | Performed by: INTERNAL MEDICINE

## 2025-04-23 PROCEDURE — 99204 OFFICE O/P NEW MOD 45 MIN: CPT | Mod: S$GLB,,, | Performed by: INTERNAL MEDICINE

## 2025-04-23 PROCEDURE — 99999 PR PBB SHADOW E&M-EST. PATIENT-LVL V: CPT | Mod: PBBFAC,,, | Performed by: INTERNAL MEDICINE

## 2025-04-23 PROCEDURE — 36415 COLL VENOUS BLD VENIPUNCTURE: CPT | Performed by: INTERNAL MEDICINE

## 2025-04-23 PROCEDURE — 3078F DIAST BP <80 MM HG: CPT | Mod: CPTII,S$GLB,, | Performed by: INTERNAL MEDICINE

## 2025-04-23 PROCEDURE — 3074F SYST BP LT 130 MM HG: CPT | Mod: CPTII,S$GLB,, | Performed by: INTERNAL MEDICINE

## 2025-04-23 PROCEDURE — 1159F MED LIST DOCD IN RCRD: CPT | Mod: CPTII,S$GLB,, | Performed by: INTERNAL MEDICINE

## 2025-04-23 PROCEDURE — 1160F RVW MEDS BY RX/DR IN RCRD: CPT | Mod: CPTII,S$GLB,, | Performed by: INTERNAL MEDICINE

## 2025-04-23 PROCEDURE — 1111F DSCHRG MED/CURRENT MED MERGE: CPT | Mod: CPTII,S$GLB,, | Performed by: INTERNAL MEDICINE

## 2025-04-23 PROCEDURE — 85025 COMPLETE CBC W/AUTO DIFF WBC: CPT | Performed by: INTERNAL MEDICINE

## 2025-04-23 NOTE — PROGRESS NOTES
Referring physician: CATY Alonso  Reason for referral: Elevated WBC      Subjective:       Patient ID: Yesi Porter is a 29 y.o. female.    1. Leukocytosis--Diagnosed 25    2. Iron Deficiency Anemia--Diagnosed     Imaging:  CT head w/o contrast done 25--no acute intracranial findings.  CTA chest done 25--no PE, no acute infiltrates or effusions.  MRI brain w/o contrast 25-- Motion degraded exam secondary to patient head movement during acquisition.Within limitations, no convincing acute or focal intracranial abnormality.  CT A/P 25--No abdominopelvic visceral acute findings identified.     Current treatment plan:  Venofer X 5 doses to start 25    Chief Complaint: Other Misc (NPH)    HPI  30 yo female referred to me for work-up of leukocytosis. Patient hospitalized in early 2025 with acute encephalopathy, syncope, found at that time to have elevated WBC, underwent extensive work-up, without any clear etiology. She was discharged home on oral doxycycline. Since that time, she has been to ED 2X with weakness/dizziness. Most recent CBC from 25 noted with WBC 16.49, H/H 11.6/36.5 and platelets 412. Ferritin noted to be low 25 26.6. Patient presents for initial visit with her mother. She is taking the oral doxycycline, has not completed treatment as of yet. She has a history of hidradenitis suppurativa which required surgery in the past, also had some chronic sinus issues/HA. Discussed that WBC elevation is likely reactive and she has EPHRAIM. She cannot tolerate oral iron due to constipation. She has had some heavy cycles in the past.     Past Medical History:   Diagnosis Date    Depression     Migraine headache       Past Surgical History:   Procedure Laterality Date    ABCESS DRAINAGE       SECTION       Family History   Problem Relation Name Age of Onset    Atrial fibrillation Mother      Arrhythmia Mother      Liver cancer Maternal Grandfather      Lung cancer  Paternal Grandmother       Social History     Socioeconomic History    Marital status: Single   Occupational History     Comment: employed   Tobacco Use    Smoking status: Never    Smokeless tobacco: Never   Substance and Sexual Activity    Alcohol use: Not Currently     Comment: on occas    Drug use: Never    Sexual activity: Yes     Partners: Male     Social Drivers of Health     Financial Resource Strain: Patient Unable To Answer (4/5/2025)    Overall Financial Resource Strain (CARDIA)     Difficulty of Paying Living Expenses: Patient unable to answer   Food Insecurity: Patient Unable To Answer (4/5/2025)    Hunger Vital Sign     Worried About Running Out of Food in the Last Year: Patient unable to answer     Ran Out of Food in the Last Year: Patient unable to answer   Transportation Needs: Patient Unable To Answer (4/5/2025)    PRAPARE - Transportation     Lack of Transportation (Medical): Patient unable to answer     Lack of Transportation (Non-Medical): Patient unable to answer   Stress: Patient Unable To Answer (4/5/2025)    [employer] - Occupational Stress Questionnaire     Feeling of Stress : Patient unable to answer   Housing Stability: Patient Unable To Answer (4/5/2025)    Housing Stability Vital Sign     Unable to Pay for Housing in the Last Year: Patient unable to answer     Homeless in the Last Year: Patient unable to answer       Review of patient's allergies indicates:  No Known Allergies   Medications Ordered Prior to Encounter[1]   Review of Systems   Constitutional:  Positive for appetite change. Negative for unexpected weight change.   HENT:  Negative for mouth sores.    Eyes:  Negative for visual disturbance.   Respiratory:  Positive for shortness of breath. Negative for cough.    Cardiovascular:  Positive for chest pain.   Gastrointestinal:  Positive for abdominal pain. Negative for diarrhea.   Genitourinary:  Negative for frequency.   Musculoskeletal:  Positive  "for back pain.   Integumentary:  Negative for rash.   Neurological:  Positive for headaches.   Hematological:  Negative for adenopathy.   Psychiatric/Behavioral:  The patient is not nervous/anxious.               Vitals:    04/23/25 1403   BP: 112/77   Pulse: 82   Resp: 14   Temp: 98.7 °F (37.1 °C)   TempSrc: Oral   SpO2: 98%   Weight: 78.7 kg (173 lb 9.6 oz)   Height: 5' 2" (1.575 m)      Physical Exam  Constitutional:       Appearance: Normal appearance.   HENT:      Head: Normocephalic.      Nose: Nose normal.      Mouth/Throat:      Mouth: Mucous membranes are moist.   Eyes:      Extraocular Movements: Extraocular movements intact.      Conjunctiva/sclera: Conjunctivae normal.   Cardiovascular:      Rate and Rhythm: Normal rate and regular rhythm.   Pulmonary:      Effort: Pulmonary effort is normal.      Breath sounds: Normal breath sounds.   Abdominal:      General: Bowel sounds are normal. There is no distension.      Palpations: Abdomen is soft.      Tenderness: There is no abdominal tenderness.   Musculoskeletal:         General: Normal range of motion.   Skin:     General: Skin is warm.      Comments: Skin right axilla nodular in appearance, multiple scars from previous infection hidradenitis   Neurological:      General: No focal deficit present.      Mental Status: She is alert and oriented to person, place, and time.   Psychiatric:         Mood and Affect: Mood normal.         Judgment: Judgment normal.         Office Visit on 04/23/2025   Component Date Value Ref Range Status    WBC 04/23/2025 11.89 (H)  4.50 - 11.50 x10(3)/mcL Final    RBC 04/23/2025 4.45  4.20 - 5.40 x10(6)/mcL Final    Hgb 04/23/2025 11.6 (L)  12.0 - 16.0 g/dL Final    Hct 04/23/2025 37.7  37.0 - 47.0 % Final    MCV 04/23/2025 84.7  80.0 - 94.0 fL Final    MCH 04/23/2025 26.1 (L)  27.0 - 31.0 pg Final    MCHC 04/23/2025 30.8 (L)  33.0 - 36.0 g/dL Final    RDW 04/23/2025 13.6  11.5 - 17.0 % Final    Platelet 04/23/2025 405 (H  130 - " 400 x10(3)/mcL Final    MPV 04/23/2025 10.3  7.4 - 10.4 fL Final    Neut % 04/23/2025 60.7  % Final    Lymph % 04/23/2025 32.8  % Final    Mono % 04/23/2025 5.0  % Final    Eos % 04/23/2025 1.0  % Final    Basophil % 04/23/2025 0.4  % Final    Imm Grans % 04/23/2025 0.1  % Final    Neut # 04/23/2025 7.22  2.1 - 9.2 x10(3)/mcL Final    Lymph # 04/23/2025 3.90  0.6 - 4.6 x10(3)/mcL Final    Mono # 04/23/2025 0.59  0.1 - 1.3 x10(3)/mcL Final    Eos # 04/23/2025 0.12  0 - 0.9 x10(3)/mcL Final    Baso # 04/23/2025 0.05  <=0.2 x10(3)/mcL Final    Imm Gran # 04/23/2025 0.01  0.00 - 0.04 x10(3)/mcL Final   Admission on 04/19/2025, Discharged on 04/19/2025   Component Date Value Ref Range Status    QRS Duration 04/19/2025 84  ms Final    OHS QTC Calculation 04/19/2025 415  ms Final    Sodium 04/19/2025 140  136 - 145 mmol/L Final    Potassium 04/19/2025 3.6  3.5 - 5.1 mmol/L Final    Chloride 04/19/2025 109 (H)  98 - 107 mmol/L Final    CO2 04/19/2025 22  22 - 29 mmol/L Final    Glucose 04/19/2025 105 (H)  74 - 100 mg/dL Final    Blood Urea Nitrogen 04/19/2025 11.6  7.0 - 18.7 mg/dL Final    Creatinine 04/19/2025 0.73  0.55 - 1.02 mg/dL Final    Calcium 04/19/2025 9.6  8.4 - 10.2 mg/dL Final    Protein Total 04/19/2025 7.9  6.4 - 8.3 gm/dL Final    Albumin 04/19/2025 4.1  3.5 - 5.0 g/dL Final    Globulin 04/19/2025 3.8 (H)  2.4 - 3.5 gm/dL Final    Albumin/Globulin Ratio 04/19/2025 1.1  1.1 - 2.0 ratio Final    Bilirubin Total 04/19/2025 0.3  <=1.5 mg/dL Final    ALP 04/19/2025 59  40 - 150 unit/L Final    ALT 04/19/2025 12  0 - 55 unit/L Final    AST 04/19/2025 13  11 - 45 unit/L Final    eGFR 04/19/2025 >60  mL/min/1.73/m2 Final    Estimated GFR calculated using the CKD-EPI creatinine (2021) equation.    Anion Gap 04/19/2025 9.0  mEq/L Final    BUN/Creatinine Ratio 04/19/2025 16   Final    Troponin-I 04/19/2025 <0.010  0.000 - 0.045 ng/mL Final    Color, UA 04/19/2025 Straw  Yellow, Light-Yellow, Dark Yellow, Jaylyn, Straw  Final    Appearance, UA 04/19/2025 Clear  Clear Final    Specific Gravity, UA 04/19/2025 1.015  1.005 - 1.030 Final    pH, UA 04/19/2025 7.5  5.0 - 8.5 Final    Protein, UA 04/19/2025 Negative  Negative Final    Glucose, UA 04/19/2025 Negative  Negative, Normal Final    Ketones, UA 04/19/2025 Negative  Negative Final    Blood, UA 04/19/2025 Negative  Negative Final    Bilirubin, UA 04/19/2025 Negative  Negative Final    Urobilinogen, UA 04/19/2025 0.2  0.2, 1.0, Normal Final    Nitrites, UA 04/19/2025 Negative  Negative Final    Leukocyte Esterase, UA 04/19/2025 Negative  Negative Final    hCG Qualitative, Urine 04/19/2025 Negative  Negative Final    Amphetamines, Urine 04/19/2025 Negative  Negative Final    Barbiturates, Urine 04/19/2025 Negative  Negative Final    Benzodiazepine, Urine 04/19/2025 Negative  Negative Final    Cannabinoids, Urine 04/19/2025 Negative  Negative Final    Cocaine, Urine 04/19/2025 Negative  Negative Final    Fentanyl, Urine 04/19/2025 Negative  Negative Final    MDMA, Urine 04/19/2025 Negative  Negative Final    Opiates, Urine 04/19/2025 Negative  Negative Final    Phencyclidine, Urine 04/19/2025 Negative  Negative Final    pH, Urine 04/19/2025 7.5  3.0 - 11.0 Final    Specific Gravity, Urine Auto 04/19/2025 1.015  1.001 - 1.035 Final    TSH 04/19/2025 1.075  0.350 - 4.940 uIU/mL Final    WBC 04/19/2025 16.49 (H)  4.50 - 11.50 x10(3)/mcL Final    RBC 04/19/2025 4.44  4.20 - 5.40 x10(6)/mcL Final    Hgb 04/19/2025 11.6 (L)  12.0 - 16.0 g/dL Final    Hct 04/19/2025 36.5 (L)  37.0 - 47.0 % Final    MCV 04/19/2025 82.2  80.0 - 94.0 fL Final    MCH 04/19/2025 26.1 (L)  27.0 - 31.0 pg Final    MCHC 04/19/2025 31.8 (L)  33.0 - 36.0 g/dL Final    RDW 04/19/2025 13.8  11.5 - 17.0 % Final    Platelet 04/19/2025 412 (H)  130 - 400 x10(3)/mcL Final    MPV 04/19/2025 10.3  7.4 - 10.4 fL Final    NRBC% 04/19/2025 0.0  % Final    Neutrophils % 04/19/2025 72  47 - 80 % Final    Lymphs % 04/19/2025 21   13 - 40 % Final    Monocytes % 04/19/2025 7  2 - 11 % Final    Neutrophils Abs Calc 04/19/2025 11.8728 (H)  2.1 - 9.2 x10(3)/mcL Final    Lymphs Abs 04/19/2025 3.4629  0.6 - 4.6 x10(3)/mcL Final    Monocytes Abs 04/19/2025 1.1543  0.1 - 1.3 x10(3)/mcL Final    Platelets 04/19/2025 Increased (A)  Normal, Adequate Final    RBC Morph 04/19/2025 Normal  Normal Final    Lactic Acid Level 04/19/2025 0.8  0.5 - 2.2 mmol/L Final    Blood Culture 04/19/2025 No Growth At 72 Hours   Preliminary    Blood Culture 04/19/2025 No Growth At 72 Hours   Preliminary           Assessment:       1. Hidradenitis suppurativa of multiple sites    2. Leukocytosis, unspecified type    3. Morbid obesity    4. Iron deficiency anemia due to chronic blood loss         Plan:       Patient with leukocytosis, more elevated in 4/2025 due to hospitalization for AMS, placed on empiric doxycycline, no clear source for infection.  Also with EPHRAIM, with mild thrombocytosis, likely from menstrual cycles.   Patient with h/o hidradenitis suppurativa, which may be chronically inflamed and causing high WBC.    Will check MPN panel and BCR-ABL to r/o MPN, but suspect this is all reactive.    Unclear why she is having these weakness episodes, currently under work-up with cardiology with Holter monitor for PFO.  Patient cannot tolerate oral iron.     Will schedule for IV iron Venofer X 5 doses.    F/u in 3 weeks for MPN panel results.  Will need another follow-up in 3 months to be sure iron deficiency is improved and plan to monitor WBC.  Refer to dermatology for treatment/evaluation of hidradenitis suppurativa. Discussed with patient today, that she likely needs to wear deodorant that does not have antiperspirant.    All questions answered at this time.     Roxanne Meredith MD                             [1]   Current Outpatient Medications on File Prior to Visit   Medication Sig Dispense Refill    butalbital-acetaminophen-caffeine -40 mg (FIORICET, ESGIC)  -40 mg per tablet Take 1 tablet by mouth every 6 (six) hours as needed for Headaches. 12 tablet 0     No current facility-administered medications on file prior to visit.

## 2025-04-24 LAB
BACTERIA BLD CULT: NORMAL
BACTERIA BLD CULT: NORMAL
HEMATOLOGIST REVIEW: NORMAL

## 2025-04-24 RX ORDER — HEPARIN 100 UNIT/ML
500 SYRINGE INTRAVENOUS
OUTPATIENT
Start: 2025-04-28

## 2025-04-24 RX ORDER — SODIUM CHLORIDE 0.9 % (FLUSH) 0.9 %
10 SYRINGE (ML) INJECTION
OUTPATIENT
Start: 2025-04-28

## 2025-04-24 RX ORDER — EPINEPHRINE 0.3 MG/.3ML
0.3 INJECTION SUBCUTANEOUS ONCE AS NEEDED
OUTPATIENT
Start: 2025-04-28

## 2025-05-01 ENCOUNTER — TELEPHONE (OUTPATIENT)
Dept: FAMILY MEDICINE | Facility: CLINIC | Age: 30
End: 2025-05-01
Payer: COMMERCIAL

## 2025-05-01 DIAGNOSIS — D72.829 LEUKOCYTOSIS, UNSPECIFIED TYPE: Primary | ICD-10-CM

## 2025-05-02 ENCOUNTER — INFUSION (OUTPATIENT)
Dept: INFUSION THERAPY | Facility: HOSPITAL | Age: 30
End: 2025-05-02
Attending: INTERNAL MEDICINE
Payer: COMMERCIAL

## 2025-05-02 DIAGNOSIS — D50.0 IRON DEFICIENCY ANEMIA DUE TO CHRONIC BLOOD LOSS: Primary | ICD-10-CM

## 2025-05-02 PROCEDURE — 63600175 PHARM REV CODE 636 W HCPCS: Performed by: INTERNAL MEDICINE

## 2025-05-02 PROCEDURE — 96374 THER/PROPH/DIAG INJ IV PUSH: CPT

## 2025-05-02 RX ORDER — SODIUM CHLORIDE 0.9 % (FLUSH) 0.9 %
10 SYRINGE (ML) INJECTION
Status: DISCONTINUED | OUTPATIENT
Start: 2025-05-02 | End: 2025-05-02 | Stop reason: HOSPADM

## 2025-05-02 RX ORDER — HEPARIN 100 UNIT/ML
500 SYRINGE INTRAVENOUS
OUTPATIENT
Start: 2025-05-09

## 2025-05-02 RX ORDER — HEPARIN 100 UNIT/ML
500 SYRINGE INTRAVENOUS
Status: DISCONTINUED | OUTPATIENT
Start: 2025-05-02 | End: 2025-05-02 | Stop reason: HOSPADM

## 2025-05-02 RX ORDER — SODIUM CHLORIDE 0.9 % (FLUSH) 0.9 %
10 SYRINGE (ML) INJECTION
OUTPATIENT
Start: 2025-05-09

## 2025-05-02 RX ORDER — EPINEPHRINE 0.3 MG/.3ML
0.3 INJECTION SUBCUTANEOUS ONCE AS NEEDED
OUTPATIENT
Start: 2025-05-09

## 2025-05-02 RX ADMIN — IRON SUCROSE 200 MG: 20 INJECTION, SOLUTION INTRAVENOUS at 11:05

## 2025-05-09 ENCOUNTER — LAB VISIT (OUTPATIENT)
Dept: LAB | Facility: HOSPITAL | Age: 30
End: 2025-05-09
Attending: NURSE PRACTITIONER
Payer: COMMERCIAL

## 2025-05-09 ENCOUNTER — INFUSION (OUTPATIENT)
Dept: INFUSION THERAPY | Facility: HOSPITAL | Age: 30
End: 2025-05-09
Attending: INTERNAL MEDICINE
Payer: COMMERCIAL

## 2025-05-09 VITALS
BODY MASS INDEX: 32.39 KG/M2 | HEART RATE: 74 BPM | DIASTOLIC BLOOD PRESSURE: 79 MMHG | TEMPERATURE: 98 F | RESPIRATION RATE: 18 BRPM | SYSTOLIC BLOOD PRESSURE: 119 MMHG | HEIGHT: 62 IN | WEIGHT: 176 LBS | OXYGEN SATURATION: 100 %

## 2025-05-09 DIAGNOSIS — D50.0 IRON DEFICIENCY ANEMIA DUE TO CHRONIC BLOOD LOSS: Primary | ICD-10-CM

## 2025-05-09 DIAGNOSIS — D72.829 LEUKOCYTOSIS, UNSPECIFIED TYPE: ICD-10-CM

## 2025-05-09 LAB
BASOPHILS # BLD AUTO: 0.05 X10(3)/MCL
BASOPHILS NFR BLD AUTO: 0.4 %
EOSINOPHIL # BLD AUTO: 0.23 X10(3)/MCL (ref 0–0.9)
EOSINOPHIL NFR BLD AUTO: 1.9 %
ERYTHROCYTE [DISTWIDTH] IN BLOOD BY AUTOMATED COUNT: 14.3 % (ref 11.5–17)
HCT VFR BLD AUTO: 37.8 % (ref 37–47)
HGB BLD-MCNC: 11.5 G/DL (ref 12–16)
IMM GRANULOCYTES # BLD AUTO: 0.02 X10(3)/MCL (ref 0–0.04)
IMM GRANULOCYTES NFR BLD AUTO: 0.2 %
LYMPHOCYTES # BLD AUTO: 4.11 X10(3)/MCL (ref 0.6–4.6)
LYMPHOCYTES NFR BLD AUTO: 34.2 %
MCH RBC QN AUTO: 26.3 PG (ref 27–31)
MCHC RBC AUTO-ENTMCNC: 30.4 G/DL (ref 33–36)
MCV RBC AUTO: 86.5 FL (ref 80–94)
MONOCYTES # BLD AUTO: 0.6 X10(3)/MCL (ref 0.1–1.3)
MONOCYTES NFR BLD AUTO: 5 %
NEUTROPHILS # BLD AUTO: 7.01 X10(3)/MCL (ref 2.1–9.2)
NEUTROPHILS NFR BLD AUTO: 58.3 %
PLATELET # BLD AUTO: 348 X10(3)/MCL (ref 130–400)
PMV BLD AUTO: 9.6 FL (ref 7.4–10.4)
RBC # BLD AUTO: 4.37 X10(6)/MCL (ref 4.2–5.4)
WBC # BLD AUTO: 12.02 X10(3)/MCL (ref 4.5–11.5)

## 2025-05-09 PROCEDURE — A4216 STERILE WATER/SALINE, 10 ML: HCPCS | Performed by: INTERNAL MEDICINE

## 2025-05-09 PROCEDURE — 25000003 PHARM REV CODE 250: Performed by: INTERNAL MEDICINE

## 2025-05-09 PROCEDURE — 85025 COMPLETE CBC W/AUTO DIFF WBC: CPT

## 2025-05-09 PROCEDURE — 63600175 PHARM REV CODE 636 W HCPCS: Performed by: INTERNAL MEDICINE

## 2025-05-09 PROCEDURE — 36415 COLL VENOUS BLD VENIPUNCTURE: CPT

## 2025-05-09 PROCEDURE — 96374 THER/PROPH/DIAG INJ IV PUSH: CPT

## 2025-05-09 RX ORDER — HEPARIN 100 UNIT/ML
500 SYRINGE INTRAVENOUS
OUTPATIENT
Start: 2025-05-16

## 2025-05-09 RX ORDER — EPINEPHRINE 0.3 MG/.3ML
0.3 INJECTION SUBCUTANEOUS ONCE AS NEEDED
OUTPATIENT
Start: 2025-05-16

## 2025-05-09 RX ORDER — SODIUM CHLORIDE 0.9 % (FLUSH) 0.9 %
10 SYRINGE (ML) INJECTION
OUTPATIENT
Start: 2025-05-16

## 2025-05-09 RX ORDER — EPINEPHRINE 0.3 MG/.3ML
0.3 INJECTION SUBCUTANEOUS ONCE AS NEEDED
Status: DISCONTINUED | OUTPATIENT
Start: 2025-05-09 | End: 2025-05-09 | Stop reason: HOSPADM

## 2025-05-09 RX ORDER — SODIUM CHLORIDE 0.9 % (FLUSH) 0.9 %
10 SYRINGE (ML) INJECTION
Status: DISCONTINUED | OUTPATIENT
Start: 2025-05-09 | End: 2025-05-09 | Stop reason: HOSPADM

## 2025-05-09 RX ORDER — HEPARIN 100 UNIT/ML
500 SYRINGE INTRAVENOUS
Status: DISCONTINUED | OUTPATIENT
Start: 2025-05-09 | End: 2025-05-09 | Stop reason: HOSPADM

## 2025-05-09 RX ADMIN — Medication 10 ML: at 11:05

## 2025-05-09 RX ADMIN — IRON SUCROSE 200 MG: 20 INJECTION, SOLUTION INTRAVENOUS at 11:05

## 2025-05-09 NOTE — NURSING
Venofer #2/5 given, tolerated well. Pt declined 30 minute observatory period, discharged home in stable condition, aware of future appts.

## 2025-05-12 ENCOUNTER — RESULTS FOLLOW-UP (OUTPATIENT)
Dept: FAMILY MEDICINE | Facility: CLINIC | Age: 30
End: 2025-05-12

## 2025-05-12 NOTE — PROGRESS NOTES
Subjective:       Patient ID: Yesi Porter is a 29 y.o. female.    PCP:  CATY Alonso    1. Leukocytosis (Reactive)--Diagnosed 25    2. Iron Deficiency Anemia--Diagnosed     Work-up:  25--ferritin 26.6  25--BCR-ABL negative, JAK2 V617F negative, CALR negative, MPL negative. Peripheral smear with borderline leukocytosis, without absolute neutrophilia, favor reactive.  There is no left shift or blast forms seen.  Platelets are normal in number and morphology.     Imaging:  CT head w/o contrast done 25--no acute intracranial findings.  CTA chest done 25--no PE, no acute infiltrates or effusions.  MRI brain w/o contrast 25-- Motion degraded exam secondary to patient head movement during acquisition.Within limitations, no convincing acute or focal intracranial abnormality.  CT A/P 25--No abdominopelvic visceral acute findings identified.     Current treatment plan:  Venofer X 5 doses to started 25  Has 3 remaining doses.     Chief Complaint: Dizziness    HPI  Patient presents for follow-up of EPHRAIM and leukocytosis. She has received only 2 doses of IV Venofer. She is scheduled for #3, 4 and 5 resuming this week. She c/o her ongoing dizziness. Work-up for leukocytosis all negative, suspect reactive.       Past Medical History:   Diagnosis Date    Depression     Migraine headache       Past Surgical History:   Procedure Laterality Date    ABCESS DRAINAGE       SECTION       Family History   Problem Relation Name Age of Onset    Atrial fibrillation Mother      Arrhythmia Mother      Liver cancer Maternal Grandfather      Lung cancer Paternal Grandmother       Social History     Socioeconomic History    Marital status: Single   Occupational History     Comment: employed   Tobacco Use    Smoking status: Never    Smokeless tobacco: Never   Substance and Sexual Activity    Alcohol use: Not Currently     Comment: on occas    Drug use: Never    Sexual activity: Yes     Partners:  "Male     Social Drivers of Health     Financial Resource Strain: Patient Unable To Answer (4/5/2025)    Overall Financial Resource Strain (CARDIA)     Difficulty of Paying Living Expenses: Patient unable to answer   Food Insecurity: Patient Unable To Answer (4/5/2025)    Hunger Vital Sign     Worried About Running Out of Food in the Last Year: Patient unable to answer     Ran Out of Food in the Last Year: Patient unable to answer   Transportation Needs: Patient Unable To Answer (4/5/2025)    PRAPARE - Transportation     Lack of Transportation (Medical): Patient unable to answer     Lack of Transportation (Non-Medical): Patient unable to answer   Stress: Patient Unable To Answer (4/5/2025)    [employer] - Occupational Stress Questionnaire     Feeling of Stress : Patient unable to answer   Housing Stability: Patient Unable To Answer (4/5/2025)    Housing Stability Vital Sign     Unable to Pay for Housing in the Last Year: Patient unable to answer     Homeless in the Last Year: Patient unable to answer       Review of patient's allergies indicates:  No Known Allergies   Medications Ordered Prior to Encounter[1]   Review of Systems   Constitutional:  Negative for unexpected weight change.   HENT:  Negative for mouth sores.    Eyes:  Negative for visual disturbance.   Respiratory:  Negative for cough and shortness of breath.    Cardiovascular:  Negative for chest pain.   Gastrointestinal:  Positive for abdominal pain. Negative for diarrhea.   Genitourinary:  Negative for frequency.   Musculoskeletal:  Positive for back pain.   Integumentary:  Negative for rash.   Neurological:  Positive for dizziness.   Hematological:  Negative for adenopathy.   Psychiatric/Behavioral:  The patient is not nervous/anxious.               Vitals:    05/15/25 1107   BP: 109/74   Pulse: 85   Resp: 14   Temp: 98.1 °F (36.7 °C)   TempSrc: Oral   SpO2: 100%   Weight: 78.5 kg (173 lb)   Height: 5' 2" (1.575 m)     Wt " Readings from Last 3 Encounters:   05/15/25 1107 78.5 kg (173 lb)   05/14/25 1426 79.3 kg (174 lb 14.4 oz)   05/09/25 1105 79.8 kg (176 lb)          Physical Exam  Constitutional:       Appearance: Normal appearance.   HENT:      Head: Normocephalic.      Nose: Nose normal.      Mouth/Throat:      Mouth: Mucous membranes are moist.   Eyes:      Extraocular Movements: Extraocular movements intact.      Conjunctiva/sclera: Conjunctivae normal.   Cardiovascular:      Rate and Rhythm: Normal rate and regular rhythm.   Pulmonary:      Effort: Pulmonary effort is normal.      Breath sounds: Normal breath sounds.   Abdominal:      General: Bowel sounds are normal. There is no distension.      Palpations: Abdomen is soft.      Tenderness: There is no abdominal tenderness.   Musculoskeletal:         General: Normal range of motion.   Skin:     General: Skin is warm.      Comments: Skin right axilla nodular in appearance, multiple scars from previous infection hidradenitis   Neurological:      General: No focal deficit present.      Mental Status: She is alert and oriented to person, place, and time.   Psychiatric:         Mood and Affect: Mood normal.         Judgment: Judgment normal.         Lab Visit on 05/09/2025   Component Date Value Ref Range Status    WBC 05/09/2025 12.02 (H)  4.50 - 11.50 x10(3)/mcL Final    RBC 05/09/2025 4.37  4.20 - 5.40 x10(6)/mcL Final    Hgb 05/09/2025 11.5 (L)  12.0 - 16.0 g/dL Final    Hct 05/09/2025 37.8  37.0 - 47.0 % Final    MCV 05/09/2025 86.5  80.0 - 94.0 fL Final    MCH 05/09/2025 26.3 (L)  27.0 - 31.0 pg Final    MCHC 05/09/2025 30.4 (L)  33.0 - 36.0 g/dL Final    RDW 05/09/2025 14.3  11.5 - 17.0 % Final    Platelet 05/09/2025 348  130 - 400 x10(3)/mcL Final    MPV 05/09/2025 9.6  7.4 - 10.4 fL Final    Neut % 05/09/2025 58.3  % Final    Lymph % 05/09/2025 34.2  % Final    Mono % 05/09/2025 5.0  % Final    Eos % 05/09/2025 1.9  % Final    Basophil % 05/09/2025 0.4  % Final    Imm  Grans % 05/09/2025 0.2  % Final    Neut # 05/09/2025 7.01  2.1 - 9.2 x10(3)/mcL Final    Lymph # 05/09/2025 4.11  0.6 - 4.6 x10(3)/mcL Final    Mono # 05/09/2025 0.60  0.1 - 1.3 x10(3)/mcL Final    Eos # 05/09/2025 0.23  0 - 0.9 x10(3)/mcL Final    Baso # 05/09/2025 0.05  <=0.2 x10(3)/mcL Final    Imm Gran # 05/09/2025 0.02  0.00 - 0.04 x10(3)/mcL Final           Assessment:       1. Hidradenitis suppurativa of multiple sites    2. Iron deficiency anemia due to chronic blood loss        Plan:       Patient with leukocytosis, more elevated in 4/2025 due to hospitalization for AMS, placed on empiric doxycycline, no clear source for infection.  Also with EPHRAIM, with mild thrombocytosis, likely from menstrual cycles.   Patient with h/o hidradenitis suppurativa, which may be chronically inflamed and causing high WBC.  MPN panel and BCR-ABL all negative from 4/2025. Suspect leukocytosis is all reactive.    Unclear why she is having these weakness episodes, currently under work-up with cardiology.    Patient could not tolerate oral iron.   She has received 2 doses of Venofer thus far.    F/u in 3 months with repeat labs including iron studies to assess response to oral iron and keep a watch on WBC.   If all good next visit, plan to monitor every 6 months.     Referred to dermatology for treatment/evaluation of hidradenitis suppurativa. Discussed with patient today, that she likely needs to wear deodorant that does not have antiperspirant.    All questions answered at this time.     Roxanne Meredith MD        Therapy Plan Information  OP IV IRON THERAPY for Iron deficiency anemia due to chronic blood loss, noted on 4/23/2025  Medications  iron sucrose injection 200 mg  200 mg, Intravenous, 1 time a week  Anaphylaxis/Hypersensitivity  EPINEPHrine (EPIPEN) 0.3 mg/0.3 mL pen injection 0.3 mg  0.3 mg, Intramuscular, PRN  hydrocortisone sodium succinate injection 100 mg  100 mg, Intravenous, PRN  Flushes  0.9% NaCl 250 mL flush  bag  Intravenous, Every visit  sodium chloride 0.9% flush 10 mL  10 mL, Intravenous, Every visit  heparin, porcine (PF) 100 unit/mL injection flush 500 Units  500 Units, Intravenous, Every visit  alteplase injection 2 mg  2 mg, Intra-Catheter, Every visit      No therapy plan of the specified type found.    No therapy plan of the specified type found.                        [1]   Current Outpatient Medications on File Prior to Visit   Medication Sig Dispense Refill    butalbital-acetaminophen-caff (FIORICET) -40 mg Cap Take by mouth as needed.       No current facility-administered medications on file prior to visit.

## 2025-05-14 ENCOUNTER — OFFICE VISIT (OUTPATIENT)
Dept: FAMILY MEDICINE | Facility: CLINIC | Age: 30
End: 2025-05-14
Payer: COMMERCIAL

## 2025-05-14 VITALS
TEMPERATURE: 99 F | HEIGHT: 62 IN | HEART RATE: 88 BPM | BODY MASS INDEX: 32.18 KG/M2 | DIASTOLIC BLOOD PRESSURE: 67 MMHG | RESPIRATION RATE: 18 BRPM | WEIGHT: 174.88 LBS | OXYGEN SATURATION: 98 % | SYSTOLIC BLOOD PRESSURE: 106 MMHG

## 2025-05-14 DIAGNOSIS — F41.9 ANXIETY: ICD-10-CM

## 2025-05-14 DIAGNOSIS — G47.30 SLEEP APNEA, UNSPECIFIED TYPE: Primary | ICD-10-CM

## 2025-05-14 PROCEDURE — 3008F BODY MASS INDEX DOCD: CPT | Mod: CPTII,,, | Performed by: NURSE PRACTITIONER

## 2025-05-14 PROCEDURE — 1160F RVW MEDS BY RX/DR IN RCRD: CPT | Mod: CPTII,,, | Performed by: NURSE PRACTITIONER

## 2025-05-14 PROCEDURE — 3044F HG A1C LEVEL LT 7.0%: CPT | Mod: CPTII,,, | Performed by: NURSE PRACTITIONER

## 2025-05-14 PROCEDURE — 3074F SYST BP LT 130 MM HG: CPT | Mod: CPTII,,, | Performed by: NURSE PRACTITIONER

## 2025-05-14 PROCEDURE — 1159F MED LIST DOCD IN RCRD: CPT | Mod: CPTII,,, | Performed by: NURSE PRACTITIONER

## 2025-05-14 PROCEDURE — 3078F DIAST BP <80 MM HG: CPT | Mod: CPTII,,, | Performed by: NURSE PRACTITIONER

## 2025-05-14 PROCEDURE — G2211 COMPLEX E/M VISIT ADD ON: HCPCS | Mod: ,,, | Performed by: NURSE PRACTITIONER

## 2025-05-14 PROCEDURE — 99213 OFFICE O/P EST LOW 20 MIN: CPT | Mod: ,,, | Performed by: NURSE PRACTITIONER

## 2025-05-14 NOTE — PROGRESS NOTES
"   Patient ID: [id number]     Chief Complaint: Follow-up      HPI:     Yesi Porter is a 29 y.o. female here today for a follow up.  She denies any new syncopal episodes.  Reports continued occasional headaches.  She is scheduled to follow up with Neurology in July.  She is currently managed by hematology for iron infusions.    Health Maintenance   Topic Date Due    COVID-19 Vaccine (3 - 2024- season) 2024    Influenza Vaccine (Season Ended) 2025    Pap Smear  2027    TETANUS VACCINE  2028    RSV Vaccine (Age 60+ and Pregnant patients) (1 - 1-dose 75+ series) 2071    Hepatitis C Screening  Completed    HIV Screening  Completed    Lipid Panel  Completed    Pneumococcal Vaccines (Age 0-49)  Aged Out       Past Medical History:  has a past medical history of Depression and Migraine headache.    Surgical History:  has a past surgical history that includes  section and Abscess drainage.    Family History: family history includes Arrhythmia in her mother; Atrial fibrillation in her mother; Liver cancer in her maternal grandfather; Lung cancer in her paternal grandmother.    Social History:  reports that she has never smoked. She has never used smokeless tobacco. She reports that she does not currently use alcohol. She reports that she does not use drugs.    Current Outpatient Medications   Medication Instructions    butalbital-acetaminophen-caff (FIORICET) -40 mg Cap Oral, As needed (PRN)       Patient has no known allergies.     Patient Care Team:  Mitzi Dow FNP as PCP - General (Family Medicine)  Harini Carmona LPN as Care Coordinator       Subjective:     Review of Systems    12 point review of systems conducted, negative except as stated in the history of present illness. See HPI for details.      Objective:     Visit Vitals  /67 (BP Location: Left arm, Patient Position: Sitting)   Pulse 88   Temp 98.8 °F (37.1 °C) (Oral)   Resp 18   Ht 5' 2" (1.575 m) "   Wt 79.3 kg (174 lb 14.4 oz)   LMP 05/05/2025 (Approximate)   SpO2 98%   BMI 31.99 kg/m²       Physical Exam    General: Alert and oriented, No acute distress.  Head: Normocephalic, Atraumatic.  Eye: Pupils are equal, round and reactive to light, Extraocular movements are intact, Sclera non-icteric.  Ears/Nose/Throat: Normal, Mucosa moist,Clear.  Neck/Thyroid: Supple, Non-tender, No carotid bruit, No lymphadenopathy, No JVD, Full range of motion.  Respiratory: Clear to auscultation bilaterally; No wheezes, rales or rhonchi,Non-labored respirations, Symmetrical chest wall expansion.  Cardiovascular: Regular rate and rhythm, S1/S2 normal, No murmurs, rubs or gallops.  Gastrointestinal: Soft, Non-tender, Non-distended, Normal bowel sounds, No palpable organomegaly.  Musculoskeletal: Normal range of motion.  Integumentary: Warm, Dry, Intact, No suspicious lesions or rashes.  Extremities: No clubbing, cyanosis or edema  Neurologic: No focal deficits, Cranial Nerves II-XII are grossly intact, Motor strength normal upper and lower extremities, Sensory exam intact.  Psychiatric: Normal interaction, Coherent speech, Euthymic mood, Appropriate affect     Labs Reviewed:     Chemistry:  Lab Results   Component Value Date     04/19/2025    K 3.6 04/19/2025    BUN 11.6 04/19/2025    CREATININE 0.73 04/19/2025    EGFRNORACEVR >60 04/19/2025    CALCIUM 9.6 04/19/2025    ALKPHOS 59 04/19/2025    ALBUMIN 4.1 04/19/2025    BILIDIR 0.10 11/08/2018    IBILI 0.20 11/08/2018    AST 13 04/19/2025    ALT 12 04/19/2025    MG 2.00 04/14/2025    PHOS 2.9 04/06/2025    [id number] 12 (L) 09/26/2024    TSH 1.075 04/19/2025    WEYVQJ1NPGB 0.85 10/27/2023        Lab Results   Component Value Date    HGBA1C 4.9 04/05/2025        Hematology:  Lab Results   Component Value Date    WBC 12.02 (H) 05/09/2025    HGB 11.5 (L) 05/09/2025    HCT 37.8 05/09/2025     05/09/2025       Lipid Panel:  Lab Results   Component Value Date    CHOL 133  04/04/2025    HDL 57 04/04/2025    LDL 69.00 04/04/2025    TRIG 33 (L) 04/04/2025    TOTALCHOLEST 2 04/04/2025        Urine:  Lab Results   Component Value Date    APPEARANCEUA Clear 04/19/2025    SGUA 1.015 04/19/2025    PROTEINUA Negative 04/19/2025    KETONESUA Negative 04/19/2025    LEUKOCYTESUR Negative 04/19/2025    RBCUA None Seen 04/04/2025    WBCUA 0-5 04/04/2025    BACTERIA None Seen 04/04/2025    SQEPUA Trace 04/04/2025    HYALINECASTS 0-2 (A) 12/14/2017          Assessment/Plan     Assessment & Plan  Sleep apnea, unspecified type  She reports hypersomnia, snoring and frequent headaches.  Referral sent to sleep medicine for sleep study.  Orders:    Ambulatory referral/consult to Sleep Disorders; Future    Anxiety  Patient declines medication management at this time.  We will reassess at next visit.  Practice deep breathing or abdominal breathing exercises when anxiety occurs.  Exercise daily. Get sunlight daily.  Avoid caffeine, alcohol and stimulants.  Practice positive phrases and repeat throughout the day, along with yoga and relaxation techniques.  Set healthy boundaries, avoid people and conversations that increase stress.  Educated patient on the risks of serotonin based medications such as serotonin modulators and SSRIs/SNRIs including common side effects of nausea, GI upset, headache dizziness as well as the rare risk for worsening symptoms of depression including development of suicidal thoughts or ideations, and serotonin syndrome.   Discussed benefits of medication not becoming noticeable until up to 6 weeks from start date.   Reports any symptoms of suicidal or homicidal ideations immediately, if clinic is closed go to nearest emergency room.      Follow up in about 6 weeks (around 6/25/2025). In addition to their scheduled follow up, the patient has also been instructed to follow up on as needed basis.     Future Appointments   Date Time Provider Department Center   5/30/2025 11:00 AM CHAIR  06, [facility]   6/17/2025  1:45 PM Tino Guzman MD [facility]   7/1/2025  2:15 PM Mitzi Dow FNP [facility]   7/9/2025 11:00 AM Julianne Armando FNP [facility]   8/22/2025 11:00 AM LAB, [facility] LAB [facility]   8/27/2025  9:20 AM Roxanne Meredith [facility]        CATY Butt

## 2025-05-15 ENCOUNTER — OFFICE VISIT (OUTPATIENT)
Dept: HEMATOLOGY/ONCOLOGY | Facility: CLINIC | Age: 30
End: 2025-05-15
Payer: COMMERCIAL

## 2025-05-15 VITALS
BODY MASS INDEX: 31.83 KG/M2 | WEIGHT: 173 LBS | HEART RATE: 85 BPM | DIASTOLIC BLOOD PRESSURE: 74 MMHG | HEIGHT: 62 IN | TEMPERATURE: 98 F | RESPIRATION RATE: 14 BRPM | OXYGEN SATURATION: 100 % | SYSTOLIC BLOOD PRESSURE: 109 MMHG

## 2025-05-15 DIAGNOSIS — D50.0 IRON DEFICIENCY ANEMIA DUE TO CHRONIC BLOOD LOSS: ICD-10-CM

## 2025-05-15 DIAGNOSIS — L73.2 HIDRADENITIS SUPPURATIVA OF MULTIPLE SITES: Primary | ICD-10-CM

## 2025-05-15 PROCEDURE — 99999 PR PBB SHADOW E&M-EST. PATIENT-LVL IV: CPT | Mod: PBBFAC,,, | Performed by: INTERNAL MEDICINE

## 2025-05-15 PROCEDURE — 3078F DIAST BP <80 MM HG: CPT | Mod: CPTII,S$GLB,, | Performed by: INTERNAL MEDICINE

## 2025-05-15 PROCEDURE — 1160F RVW MEDS BY RX/DR IN RCRD: CPT | Mod: CPTII,S$GLB,, | Performed by: INTERNAL MEDICINE

## 2025-05-15 PROCEDURE — 3074F SYST BP LT 130 MM HG: CPT | Mod: CPTII,S$GLB,, | Performed by: INTERNAL MEDICINE

## 2025-05-15 PROCEDURE — 3008F BODY MASS INDEX DOCD: CPT | Mod: CPTII,S$GLB,, | Performed by: INTERNAL MEDICINE

## 2025-05-15 PROCEDURE — 99214 OFFICE O/P EST MOD 30 MIN: CPT | Mod: S$GLB,,, | Performed by: INTERNAL MEDICINE

## 2025-05-15 PROCEDURE — 1159F MED LIST DOCD IN RCRD: CPT | Mod: CPTII,S$GLB,, | Performed by: INTERNAL MEDICINE

## 2025-05-15 PROCEDURE — 3044F HG A1C LEVEL LT 7.0%: CPT | Mod: CPTII,S$GLB,, | Performed by: INTERNAL MEDICINE

## 2025-05-15 RX ORDER — BUTALBITAL, ACETAMINOPHEN AND CAFFEINE 300; 40; 50 MG/1; MG/1; MG/1
CAPSULE ORAL
COMMUNITY
Start: 2025-04-28

## 2025-05-16 ENCOUNTER — INFUSION (OUTPATIENT)
Dept: INFUSION THERAPY | Facility: HOSPITAL | Age: 30
End: 2025-05-16
Attending: INTERNAL MEDICINE
Payer: COMMERCIAL

## 2025-05-16 VITALS
RESPIRATION RATE: 18 BRPM | SYSTOLIC BLOOD PRESSURE: 109 MMHG | TEMPERATURE: 99 F | HEART RATE: 85 BPM | OXYGEN SATURATION: 100 % | DIASTOLIC BLOOD PRESSURE: 73 MMHG

## 2025-05-16 DIAGNOSIS — D50.0 IRON DEFICIENCY ANEMIA DUE TO CHRONIC BLOOD LOSS: Primary | ICD-10-CM

## 2025-05-16 PROCEDURE — 63600175 PHARM REV CODE 636 W HCPCS: Performed by: INTERNAL MEDICINE

## 2025-05-16 PROCEDURE — 96374 THER/PROPH/DIAG INJ IV PUSH: CPT

## 2025-05-16 RX ORDER — HEPARIN 100 UNIT/ML
500 SYRINGE INTRAVENOUS
Status: DISCONTINUED | OUTPATIENT
Start: 2025-05-16 | End: 2025-05-16 | Stop reason: HOSPADM

## 2025-05-16 RX ORDER — SODIUM CHLORIDE 0.9 % (FLUSH) 0.9 %
10 SYRINGE (ML) INJECTION
OUTPATIENT
Start: 2025-05-23

## 2025-05-16 RX ORDER — EPINEPHRINE 0.3 MG/.3ML
0.3 INJECTION SUBCUTANEOUS ONCE AS NEEDED
Status: DISCONTINUED | OUTPATIENT
Start: 2025-05-16 | End: 2025-05-16 | Stop reason: HOSPADM

## 2025-05-16 RX ORDER — EPINEPHRINE 0.3 MG/.3ML
0.3 INJECTION SUBCUTANEOUS ONCE AS NEEDED
OUTPATIENT
Start: 2025-05-23

## 2025-05-16 RX ORDER — HEPARIN 100 UNIT/ML
500 SYRINGE INTRAVENOUS
OUTPATIENT
Start: 2025-05-23

## 2025-05-16 RX ORDER — SODIUM CHLORIDE 0.9 % (FLUSH) 0.9 %
10 SYRINGE (ML) INJECTION
Status: DISCONTINUED | OUTPATIENT
Start: 2025-05-16 | End: 2025-05-16 | Stop reason: HOSPADM

## 2025-05-16 RX ADMIN — IRON SUCROSE 200 MG: 20 INJECTION, SOLUTION INTRAVENOUS at 08:05

## 2025-05-23 ENCOUNTER — INFUSION (OUTPATIENT)
Dept: INFUSION THERAPY | Facility: HOSPITAL | Age: 30
End: 2025-05-23
Attending: INTERNAL MEDICINE
Payer: COMMERCIAL

## 2025-05-23 VITALS
OXYGEN SATURATION: 98 % | SYSTOLIC BLOOD PRESSURE: 115 MMHG | TEMPERATURE: 98 F | RESPIRATION RATE: 18 BRPM | DIASTOLIC BLOOD PRESSURE: 78 MMHG | HEART RATE: 80 BPM

## 2025-05-23 DIAGNOSIS — D50.0 IRON DEFICIENCY ANEMIA DUE TO CHRONIC BLOOD LOSS: Primary | ICD-10-CM

## 2025-05-23 PROCEDURE — 96374 THER/PROPH/DIAG INJ IV PUSH: CPT

## 2025-05-23 PROCEDURE — 63600175 PHARM REV CODE 636 W HCPCS: Performed by: INTERNAL MEDICINE

## 2025-05-23 RX ORDER — HEPARIN 100 UNIT/ML
500 SYRINGE INTRAVENOUS
OUTPATIENT
Start: 2025-05-30

## 2025-05-23 RX ORDER — EPINEPHRINE 0.3 MG/.3ML
0.3 INJECTION SUBCUTANEOUS ONCE AS NEEDED
Status: DISCONTINUED | OUTPATIENT
Start: 2025-05-23 | End: 2025-05-23 | Stop reason: HOSPADM

## 2025-05-23 RX ORDER — SODIUM CHLORIDE 0.9 % (FLUSH) 0.9 %
10 SYRINGE (ML) INJECTION
Status: DISCONTINUED | OUTPATIENT
Start: 2025-05-23 | End: 2025-05-23 | Stop reason: HOSPADM

## 2025-05-23 RX ORDER — HEPARIN 100 UNIT/ML
500 SYRINGE INTRAVENOUS
Status: DISCONTINUED | OUTPATIENT
Start: 2025-05-23 | End: 2025-05-23 | Stop reason: HOSPADM

## 2025-05-23 RX ORDER — SODIUM CHLORIDE 0.9 % (FLUSH) 0.9 %
10 SYRINGE (ML) INJECTION
OUTPATIENT
Start: 2025-05-30

## 2025-05-23 RX ORDER — EPINEPHRINE 0.3 MG/.3ML
0.3 INJECTION SUBCUTANEOUS ONCE AS NEEDED
OUTPATIENT
Start: 2025-05-30

## 2025-05-23 RX ADMIN — IRON SUCROSE 200 MG: 20 INJECTION, SOLUTION INTRAVENOUS at 11:05

## 2025-05-23 NOTE — PLAN OF CARE
Venofer #4/5 given; tolerated well; next appointments confirmed with patient; discharged home in stable condition.

## 2025-05-30 ENCOUNTER — INFUSION (OUTPATIENT)
Dept: INFUSION THERAPY | Facility: HOSPITAL | Age: 30
End: 2025-05-30
Attending: INTERNAL MEDICINE
Payer: COMMERCIAL

## 2025-05-30 VITALS — WEIGHT: 175 LBS | BODY MASS INDEX: 32.01 KG/M2

## 2025-05-30 DIAGNOSIS — D50.0 IRON DEFICIENCY ANEMIA DUE TO CHRONIC BLOOD LOSS: Primary | ICD-10-CM

## 2025-05-30 PROCEDURE — 63600175 PHARM REV CODE 636 W HCPCS: Performed by: INTERNAL MEDICINE

## 2025-05-30 PROCEDURE — 96374 THER/PROPH/DIAG INJ IV PUSH: CPT

## 2025-05-30 RX ORDER — SODIUM CHLORIDE 0.9 % (FLUSH) 0.9 %
10 SYRINGE (ML) INJECTION
OUTPATIENT
Start: 2025-05-30

## 2025-05-30 RX ORDER — HEPARIN 100 UNIT/ML
500 SYRINGE INTRAVENOUS
OUTPATIENT
Start: 2025-05-30

## 2025-05-30 RX ORDER — HEPARIN 100 UNIT/ML
500 SYRINGE INTRAVENOUS
Status: DISCONTINUED | OUTPATIENT
Start: 2025-05-30 | End: 2025-05-30 | Stop reason: HOSPADM

## 2025-05-30 RX ORDER — SODIUM CHLORIDE 0.9 % (FLUSH) 0.9 %
10 SYRINGE (ML) INJECTION
Status: DISCONTINUED | OUTPATIENT
Start: 2025-05-30 | End: 2025-05-30 | Stop reason: HOSPADM

## 2025-05-30 RX ORDER — EPINEPHRINE 0.3 MG/.3ML
0.3 INJECTION SUBCUTANEOUS ONCE AS NEEDED
OUTPATIENT
Start: 2025-05-30

## 2025-05-30 RX ADMIN — IRON SUCROSE 200 MG: 20 INJECTION, SOLUTION INTRAVENOUS at 11:05

## 2025-05-30 NOTE — ASSESSMENT & PLAN NOTE
Patient declines medication management at this time.  We will reassess at next visit.  Practice deep breathing or abdominal breathing exercises when anxiety occurs.  Exercise daily. Get sunlight daily.  Avoid caffeine, alcohol and stimulants.  Practice positive phrases and repeat throughout the day, along with yoga and relaxation techniques.  Set healthy boundaries, avoid people and conversations that increase stress.  Educated patient on the risks of serotonin based medications such as serotonin modulators and SSRIs/SNRIs including common side effects of nausea, GI upset, headache dizziness as well as the rare risk for worsening symptoms of depression including development of suicidal thoughts or ideations, and serotonin syndrome.   Discussed benefits of medication not becoming noticeable until up to 6 weeks from start date.   Reports any symptoms of suicidal or homicidal ideations immediately, if clinic is closed go to nearest emergency room.

## 2025-06-03 ENCOUNTER — PATIENT MESSAGE (OUTPATIENT)
Dept: SLEEP MEDICINE | Facility: HOSPITAL | Age: 30
End: 2025-06-03
Payer: COMMERCIAL

## 2025-06-04 ENCOUNTER — TELEPHONE (OUTPATIENT)
Dept: SLEEP MEDICINE | Facility: HOSPITAL | Age: 30
End: 2025-06-04
Payer: COMMERCIAL

## 2025-06-04 DIAGNOSIS — G47.33 OBSTRUCTIVE SLEEP APNEA: Primary | ICD-10-CM

## 2025-06-11 ENCOUNTER — PROCEDURE VISIT (OUTPATIENT)
Dept: SLEEP MEDICINE | Facility: HOSPITAL | Age: 30
End: 2025-06-11
Attending: NURSE PRACTITIONER
Payer: COMMERCIAL

## 2025-06-11 DIAGNOSIS — G47.33 OBSTRUCTIVE SLEEP APNEA: ICD-10-CM

## 2025-06-11 PROCEDURE — G0399 HOME SLEEP TEST/TYPE 3 PORTA: HCPCS

## 2025-06-11 PROCEDURE — 95806 SLEEP STUDY UNATT&RESP EFFT: CPT | Mod: 26,,, | Performed by: PSYCHIATRY & NEUROLOGY

## 2025-06-19 ENCOUNTER — PATIENT OUTREACH (OUTPATIENT)
Facility: CLINIC | Age: 30
End: 2025-06-19
Payer: COMMERCIAL

## 2025-06-19 NOTE — PROGRESS NOTES
Population Health Outreach.    Updated Care Team.     Patient is not due for any topics at this time.                        Next Primary Care Visit Date: 7/1/2025

## 2025-07-01 ENCOUNTER — OFFICE VISIT (OUTPATIENT)
Dept: FAMILY MEDICINE | Facility: CLINIC | Age: 30
End: 2025-07-01
Payer: COMMERCIAL

## 2025-07-01 VITALS
RESPIRATION RATE: 18 BRPM | DIASTOLIC BLOOD PRESSURE: 82 MMHG | WEIGHT: 175 LBS | HEART RATE: 71 BPM | OXYGEN SATURATION: 99 % | TEMPERATURE: 98 F | BODY MASS INDEX: 32.2 KG/M2 | HEIGHT: 62 IN | SYSTOLIC BLOOD PRESSURE: 117 MMHG

## 2025-07-01 DIAGNOSIS — E66.01 MORBID OBESITY: ICD-10-CM

## 2025-07-01 DIAGNOSIS — F43.10 PTSD (POST-TRAUMATIC STRESS DISORDER): Primary | ICD-10-CM

## 2025-07-01 PROCEDURE — 99213 OFFICE O/P EST LOW 20 MIN: CPT | Mod: ,,, | Performed by: NURSE PRACTITIONER

## 2025-07-01 PROCEDURE — 3044F HG A1C LEVEL LT 7.0%: CPT | Mod: CPTII,,, | Performed by: NURSE PRACTITIONER

## 2025-07-01 PROCEDURE — 3074F SYST BP LT 130 MM HG: CPT | Mod: CPTII,,, | Performed by: NURSE PRACTITIONER

## 2025-07-01 PROCEDURE — 3008F BODY MASS INDEX DOCD: CPT | Mod: CPTII,,, | Performed by: NURSE PRACTITIONER

## 2025-07-01 PROCEDURE — 3079F DIAST BP 80-89 MM HG: CPT | Mod: CPTII,,, | Performed by: NURSE PRACTITIONER

## 2025-07-01 PROCEDURE — G2211 COMPLEX E/M VISIT ADD ON: HCPCS | Mod: ,,, | Performed by: NURSE PRACTITIONER

## 2025-07-01 RX ORDER — BUSPIRONE HYDROCHLORIDE 5 MG/1
5 TABLET ORAL 2 TIMES DAILY
Qty: 60 TABLET | Refills: 11 | Status: SHIPPED | OUTPATIENT
Start: 2025-07-01 | End: 2026-07-01

## 2025-07-01 NOTE — PROGRESS NOTES
Patient ID: [id number]     Chief Complaint: Follow-up (6 wk F/U)      HPI:     Yesi Porter is a 30 y.o. female here today for a follow up. No other complaints today.     Health Maintenance   Topic Date Due    COVID-19 Vaccine (3 - 2024- season) 2024    Influenza Vaccine (1) 2025    TETANUS VACCINE  2028    Pap Smear  2028    RSV Vaccine (Age 60+ and Pregnant patients) (1 - 1-dose 75+ series) 2071    Hepatitis C Screening  Completed    HIV Screening  Completed    Lipid Panel  Completed    Pneumococcal Vaccines (Age 0-49)  Aged Out       Past Medical History:  has a past medical history of Migraine headache and PTSD (post-traumatic stress disorder).    Surgical History:  has a past surgical history that includes  section and Abscess drainage.    Family History: family history includes Arrhythmia in her mother; Atrial fibrillation in her mother; Liver cancer in her maternal grandfather; Lung cancer in her paternal grandmother.    Social History:  reports that she has never smoked. She has never used smokeless tobacco. She reports that she does not currently use alcohol. She reports that she does not use drugs.    Current Outpatient Medications   Medication Instructions    busPIRone (BUSPAR) 5 mg, Oral, 2 times daily    butalbital-acetaminophen-caff (FIORICET) -40 mg Cap As needed (PRN)    norelgestromin-ethinyl estradiol (XULANE) 150-35 mcg/24 hr 1 patch, Transdermal, Weekly    NURTEC 75 mg, Oral, As needed (PRN), Place ODT tablet on the tongue; alternatively the ODT tablet may be placed under the tongue       Patient has no known allergies.     Patient Care Team:  Mitzi Dow FNP as PCP - General (Family Medicine)  Harini Carmona LPN as Care Coordinator  Tino Guzman MD as Consulting Physician (Obstetrics and Gynecology)  Cheyenne Sultana MD as Consulting Physician (Ophthalmology)  Roxanne Meredith MD as Consulting Physician (Oncology)  Ab  "Cardiovascular [facility] (Cardiovascular Disease)       Subjective:     Review of Systems    12 point review of systems conducted, negative except as stated in the history of present illness. See HPI for details.      Objective:     Visit Vitals  /82   Pulse 71   Temp 98.2 °F (36.8 °C)   Resp 18   Ht 5' 2" (1.575 m)   Wt 79.4 kg (175 lb)   LMP 07/01/2025   SpO2 99%   BMI 32.01 kg/m²       Physical Exam    General: Alert and oriented, No acute distress.  Head: Normocephalic, Atraumatic.  Eye: Pupils are equal, round and reactive to light, Extraocular movements are intact, Sclera non-icteric.  Ears/Nose/Throat: Normal, Mucosa moist,Clear.  Neck/Thyroid: Supple, Non-tender, No carotid bruit, No lymphadenopathy, No JVD, Full range of motion.  Respiratory: Clear to auscultation bilaterally; No wheezes, rales or rhonchi,Non-labored respirations, Symmetrical chest wall expansion.  Cardiovascular: Regular rate and rhythm, S1/S2 normal, No murmurs, rubs or gallops.  Gastrointestinal: Soft, Non-tender, Non-distended, Normal bowel sounds, No palpable organomegaly.  Musculoskeletal: Normal range of motion.  Integumentary: Warm, Dry, Intact, No suspicious lesions or rashes.  Extremities: No clubbing, cyanosis or edema  Neurologic: No focal deficits, Cranial Nerves II-XII are grossly intact, Motor strength normal upper and lower extremities, Sensory exam intact.  Psychiatric: Normal interaction, Coherent speech, Euthymic mood, Appropriate affect     Labs Reviewed:     Chemistry:  Lab Results   Component Value Date     04/19/2025    K 3.6 04/19/2025    BUN 11.6 04/19/2025    CREATININE 0.73 04/19/2025    EGFRNORACEVR >60 04/19/2025    CALCIUM 9.6 04/19/2025    ALKPHOS 59 04/19/2025    ALBUMIN 4.1 04/19/2025    BILIDIR 0.10 11/08/2018    IBILI 0.20 11/08/2018    AST 13 04/19/2025    ALT 12 04/19/2025    MG 2.00 04/14/2025    PHOS 2.9 04/06/2025    [id number] 12 (L) 09/26/2024    TSH 1.075 " 04/19/2025    [id number] 0.85 10/27/2023        Lab Results   Component Value Date    HGBA1C 4.9 04/05/2025        Hematology:  Lab Results   Component Value Date    WBC 12.02 (H) 05/09/2025    HGB 11.5 (L) 05/09/2025    HCT 37.8 05/09/2025     05/09/2025       Lipid Panel:  Lab Results   Component Value Date    CHOL 133 04/04/2025    HDL 57 04/04/2025    TRIG 33 (L) 04/04/2025    TOTALCHOLEST 2 04/04/2025        Urine:  Lab Results   Component Value Date    APPEARANCEUA Clear 04/19/2025    SGUA 1.015 04/19/2025    PROTEINUA Negative 04/19/2025    KETONESUA Negative 04/19/2025    LEUKOCYTESUR Negative 04/19/2025    RBCUA None Seen 04/04/2025    WBCUA 0-5 04/04/2025    BACTERIA None Seen 04/04/2025    SQEPUA Trace 04/04/2025    HYALINECASTS 0-2 (A) 12/14/2017          Assessment/Plan     Assessment & Plan  PTSD (post-traumatic stress disorder)  Stable.  Not at goal.  Start buspirone 5 mg p.o. b.i.d..  ED precautions given.  Positive support system encouraged.  Orders:    busPIRone (BUSPAR) 5 MG Tab; Take 1 tablet (5 mg total) by mouth 2 (two) times daily.    Morbid obesity  Body mass index is 32.01 kg/m².  Goal BMI <30.  Exercise 5 times a week for 30 minutes per day.  Avoid soda, simple sugars, excessive rice, potatoes or bread. Limit fast foods and fried foods.  Choose complex carbs in moderation (example: green vegetables, beans, oatmeal). Eat plenty of fresh fruits and vegetables with lean meats daily.  Do not skip meals. Eat a balanced portion size.  Avoid fad diets. Consider permanent healthy life style changes.         Follow up in about 6 weeks (around 8/12/2025) for Wellness. In addition to their scheduled follow up, the patient has also been instructed to follow up on as needed basis.     Future Appointments   Date Time Provider Department Center   8/13/2025  3:00 PM Mitzi Dow, P [facility]   8/22/2025 11:00 AM LAB, [facility]   8/27/2025  9:20 AM [facility] [address]   1/28/2026  2:00 PM Julianne Armando FNP [facility]   6/23/2026  1:30 PM Tino Guzman MD [facility]        Mitzi Dow [facility]

## 2025-07-09 ENCOUNTER — OFFICE VISIT (OUTPATIENT)
Dept: NEUROLOGY | Facility: CLINIC | Age: 30
End: 2025-07-09
Payer: COMMERCIAL

## 2025-07-09 VITALS
BODY MASS INDEX: 32.39 KG/M2 | SYSTOLIC BLOOD PRESSURE: 112 MMHG | HEART RATE: 81 BPM | HEIGHT: 62 IN | DIASTOLIC BLOOD PRESSURE: 81 MMHG | WEIGHT: 176 LBS

## 2025-07-09 DIAGNOSIS — G43.909 ACUTE CONFUSIONAL MIGRAINE: Primary | ICD-10-CM

## 2025-07-09 DIAGNOSIS — G43.909 MIGRAINE WITHOUT STATUS MIGRAINOSUS, NOT INTRACTABLE, UNSPECIFIED MIGRAINE TYPE: ICD-10-CM

## 2025-07-09 PROCEDURE — 99999 PR PBB SHADOW E&M-EST. PATIENT-LVL III: CPT | Mod: PBBFAC,,, | Performed by: NURSE PRACTITIONER

## 2025-07-09 PROCEDURE — 1160F RVW MEDS BY RX/DR IN RCRD: CPT | Mod: CPTII,S$GLB,, | Performed by: NURSE PRACTITIONER

## 2025-07-09 PROCEDURE — 1159F MED LIST DOCD IN RCRD: CPT | Mod: CPTII,S$GLB,, | Performed by: NURSE PRACTITIONER

## 2025-07-09 PROCEDURE — 3008F BODY MASS INDEX DOCD: CPT | Mod: CPTII,S$GLB,, | Performed by: NURSE PRACTITIONER

## 2025-07-09 PROCEDURE — 99215 OFFICE O/P EST HI 40 MIN: CPT | Mod: S$GLB,,, | Performed by: NURSE PRACTITIONER

## 2025-07-09 PROCEDURE — 3079F DIAST BP 80-89 MM HG: CPT | Mod: CPTII,S$GLB,, | Performed by: NURSE PRACTITIONER

## 2025-07-09 PROCEDURE — 3044F HG A1C LEVEL LT 7.0%: CPT | Mod: CPTII,S$GLB,, | Performed by: NURSE PRACTITIONER

## 2025-07-09 PROCEDURE — 3074F SYST BP LT 130 MM HG: CPT | Mod: CPTII,S$GLB,, | Performed by: NURSE PRACTITIONER

## 2025-07-09 RX ORDER — RIMEGEPANT SULFATE 75 MG/75MG
75 TABLET, ORALLY DISINTEGRATING ORAL
Qty: 16 TABLET | Refills: 5 | Status: SHIPPED | OUTPATIENT
Start: 2025-07-09

## 2025-07-09 NOTE — PROGRESS NOTES
Subjective:       Patient ID: Yesi Porter is a 29 y.o. female.    Chief Complaint: encephalopathy  (Pt here for a hospital f/u for migraines. Pt states in April her migraine had gotten so bad to where she couldn't get out of bed and couldn't function doing daily tasks. She experienced weakness, fatigue and ended losing conscience. She woke up in the MRI machine. Pt believes she have complex migraines and she is currently taking fioricet as prescribed and tolerates it well.)      History of Present Illness:  History of Present Illness    Patient presents today for follow up of altered mental status episode with severe headache that occurred on April 4th, 2025 She reports an initial severe headache episode characterized by excruciating pain that began the night before and progressively worsened the next morning. She experienced significant weakness, feeling like she was about to pass out, and developed a sensation of a fever. At urgent care, she had staggered walking and became unresponsive, remaining unconscious from 8:00 AM to 8:00 PM. She was diagnosed with migraines and treated with migraine cocktail, which resolved the initial severe headache episode. She currently experiences intermittent headaches occurring approximately twice per week with week-long asymptomatic periods between episodes. Headache location is primarily in the frontal region, temples, and sinus area. Current headaches are less severe compared to the initial episode. During headache episodes, she experiences a strong desire to lay down and sleep. She denies photophobia or phonophobia. She denies consistent triggers for headaches, though initially wondered if menstrual cycle might be related. Headaches do not consistently resolve with OTC pain medication as they previously did. She currently takes Fioricet for headache management. The medication provides relief but she notes significant delay in onset of action. She previously attempted treatment  "with Tylenol and sinus medication without therapeutic benefit.  She denies vision changes, weakness, or numbness on one side of the body. She experiences ongoing anxiety and fear related to headaches, often contacting her mother when symptoms arise due to heightened anxiety about headaches since the initial severe episode. MRI brain was normal with no acute or focal intracranial abnormalities. Lumbar puncture was performed with opening pressure of 25, which is borderline normal.  Meningitis and encephalitis panel from lumbar puncture was negative. HSV PCR was negative. Autoimmune encephalopathy CSF panel was negative. EEG was normal. Recent eye exam revealed astigmatism, with dilated exam showing no other significant findings. She has no known family history of migraines.                    Past Medical History:   Diagnosis Date    Migraine headache     PTSD (post-traumatic stress disorder)     anexity       Past Surgical History:   Procedure Laterality Date    ABCESS DRAINAGE       SECTION          Family History   Problem Relation Name Age of Onset    Atrial fibrillation Mother      Arrhythmia Mother      Liver cancer Maternal Grandfather      Lung cancer Paternal Grandmother          Social History[1]     Encounter Medications[2]   Objective:   /81 (BP Location: Left arm, Patient Position: Sitting)   Pulse 81   Ht 5' 2" (1.575 m)   Wt 79.8 kg (176 lb)   LMP 2025   BMI 32.19 kg/m²        Physical Exam  General:  Alert and oriented  NAD  No overt edema    Cognition and Comprehension:  Speech and language intact  Follows commands    Cranial nerves:   CN 2_ Visual fields (full to confrontation both eyes)  CN 3, 4, 6_ Intact, OLGA, no nystagmus  CN 5_facial tactile sensation intact  CN 7_no face asymmetry; normal eye closure and smile  CN 8_hearing intact to spoken voice  CN 9, 10, 11_voice normal, shoulder shrug ok; deltoids not fatigable   CN 12 tongue_protrudes mid line    Muscle Strength " and Tone:  Normal upper extremity tone  Normal lower extremity tone  Normal upper extremity strength  Normal lower extremity strength    Reflexes:  Normal and symmetric    Sensation:  Intact to light touch and temperature    Coordination and Gait:  Coordination and gait are normal       Assessment & Plan:      Active Problem List with Overview Notes    Diagnosis Date Noted    Acute confusional migraine 07/09/2025    Migraine without status migrainosus, not intractable 07/09/2025    Hidradenitis suppurativa of multiple sites 04/23/2025    Iron deficiency anemia due to chronic blood loss 04/23/2025    Encephalopathy, unspecified 04/07/2025    Encephalopathy acute 04/05/2025    AMS (altered mental status) 04/05/2025    Right knee pain 11/06/2023    Anxiety 11/06/2023    Morbid obesity 01/10/2023    Depression 10/31/2022    Encounter to establish care 10/31/2022    Encounter for vaccination 10/31/2022      Assessment & Plan    MIGRAINE:  - Assessed recent hospitalization for altered mental status, likely due to confusional migraine (complex migraine).  - Current headache pattern does not warrant preventive medication, opting for as-needed treatment.  - Switched from Fioricet to Nurtec for more targeted migraine therapy   - Explained confusional migraine and its stroke-like features.  - Discussed risk factors for migraines, including female gender, hormonal fluctuations, and stress.  - Explained that migraines often have no identifiable cause.  - Explained mechanism of action for Nurtec, targeting specific peptides in the brain responsible for migraine symptoms.  - Educated on importance of prompt treatment at onset of headache symptoms.  - Started Nurtec for migraine treatment: Dissolve 1 tablet under tongue at onset of headache symptom.  - Continued Fioricet as backup option if needed.    DISORIENTATION:  - Reviewed normal MRI, EEG, and lumbar puncture results, ruling out major neurological issues.  - Noted borderline  opening pressure of 25 during lumbar puncture, but recent ophthalmology exam was normal.    FOLLOW-UP:  - Contact office if migraines become more frequent, severe, or unresponsive to new medication.        40 total minutes    This note was generated with the assistance of ambient listening technology. Verbal consent was obtained by the patient and accompanying visitor(s) for the recording of patient appointment to facilitate this note. I attest to having reviewed and edited the generated note for accuracy, though some syntax or spelling errors may persist. Please contact the author of this note for any clarification.       This note was created with the assistance of voice recognition software. There may be transcription errors as a result of using this technology however minimal. Effort has been made to assure accuracy of transcription but any obvious errors or omissions should be clarified with the author of the document.               [1]   Social History  Socioeconomic History    Marital status: Single   Occupational History     Comment: employed   Tobacco Use    Smoking status: Never    Smokeless tobacco: Never   Substance and Sexual Activity    Alcohol use: Not Currently     Comment: on occas    Drug use: Never    Sexual activity: Yes     Partners: Male     Social Drivers of Health     Financial Resource Strain: Patient Unable To Answer (4/5/2025)    Overall Financial Resource Strain (CARDIA)     Difficulty of Paying Living Expenses: Patient unable to answer   Food Insecurity: Patient Unable To Answer (4/5/2025)    Hunger Vital Sign     Worried About Running Out of Food in the Last Year: Patient unable to answer     Ran Out of Food in the Last Year: Patient unable to answer   Transportation Needs: Patient Unable To Answer (4/5/2025)    PRAPARE - Transportation     Lack of Transportation (Medical): Patient unable to answer     Lack of Transportation (Non-Medical): Patient unable to answer   Stress: Patient  Unable To Answer (2025)    [employer] - Occupational Stress Questionnaire     Feeling of Stress : Patient unable to answer   Housing Stability: Patient Unable To Answer (2025)    Housing Stability Vital Sign     Unable to Pay for Housing in the Last Year: Patient unable to answer     Homeless in the Last Year: Patient unable to answer   [2]   Outpatient Encounter Medications as of 2025   Medication Sig Dispense Refill    busPIRone (BUSPAR) 5 MG Tab Take 1 tablet (5 mg total) by mouth 2 (two) times daily. 60 tablet 11    butalbital-acetaminophen-caff (FIORICET) -40 mg Cap Take by mouth as needed.      norelgestromin-ethinyl estradiol (XULANE) 150-35 mcg/24 hr Place 1 patch onto the skin once a week. 4 patch 11    [] metroNIDAZOLE (NUVESSA) 1.3 % (65 mg/5 gram) Gel Place 1 Applicatorful vaginally once. for 1 dose 5 g 0     No facility-administered encounter medications on file as of 2025.

## 2025-08-04 NOTE — ASSESSMENT & PLAN NOTE

## 2025-08-06 DIAGNOSIS — Z00.00 WELL ADULT EXAM: Primary | ICD-10-CM

## 2025-08-11 ENCOUNTER — LAB VISIT (OUTPATIENT)
Dept: LAB | Facility: HOSPITAL | Age: 30
End: 2025-08-11
Attending: NURSE PRACTITIONER
Payer: COMMERCIAL

## 2025-08-11 DIAGNOSIS — Z00.00 WELL ADULT EXAM: ICD-10-CM

## 2025-08-11 LAB
25(OH)D3+25(OH)D2 SERPL-MCNC: 13 NG/ML (ref 30–80)
ALBUMIN SERPL-MCNC: 4 G/DL (ref 3.5–5)
ALBUMIN/GLOB SERPL: 1 RATIO (ref 1.1–2)
ALP SERPL-CCNC: 54 UNIT/L (ref 40–150)
ALT SERPL-CCNC: 11 UNIT/L (ref 0–55)
ANION GAP SERPL CALC-SCNC: 6 MEQ/L
AST SERPL-CCNC: 12 UNIT/L (ref 11–45)
BASOPHILS # BLD AUTO: 0.04 X10(3)/MCL
BASOPHILS NFR BLD AUTO: 0.4 %
BILIRUB SERPL-MCNC: 0.3 MG/DL
BUN SERPL-MCNC: 11.4 MG/DL (ref 7–18.7)
CALCIUM SERPL-MCNC: 9.2 MG/DL (ref 8.4–10.2)
CHLORIDE SERPL-SCNC: 106 MMOL/L (ref 98–107)
CHOLEST SERPL-MCNC: 143 MG/DL
CHOLEST/HDLC SERPL: 3 {RATIO} (ref 0–5)
CO2 SERPL-SCNC: 27 MMOL/L (ref 22–29)
CREAT SERPL-MCNC: 0.72 MG/DL (ref 0.55–1.02)
CREAT/UREA NIT SERPL: 16
EOSINOPHIL # BLD AUTO: 0.19 X10(3)/MCL (ref 0–0.9)
EOSINOPHIL NFR BLD AUTO: 1.9 %
ERYTHROCYTE [DISTWIDTH] IN BLOOD BY AUTOMATED COUNT: 13.1 % (ref 11.5–17)
EST. AVERAGE GLUCOSE BLD GHB EST-MCNC: 96.8 MG/DL
GFR SERPLBLD CREATININE-BSD FMLA CKD-EPI: >60 ML/MIN/1.73/M2
GLOBULIN SER-MCNC: 3.9 GM/DL (ref 2.4–3.5)
GLUCOSE SERPL-MCNC: 90 MG/DL (ref 74–100)
HBA1C MFR BLD: 5 %
HCT VFR BLD AUTO: 40.5 % (ref 37–47)
HDLC SERPL-MCNC: 51 MG/DL (ref 35–60)
HGB BLD-MCNC: 12.7 G/DL (ref 12–16)
IMM GRANULOCYTES # BLD AUTO: 0.03 X10(3)/MCL (ref 0–0.04)
IMM GRANULOCYTES NFR BLD AUTO: 0.3 %
LDLC SERPL CALC-MCNC: 82 MG/DL (ref 50–140)
LYMPHOCYTES # BLD AUTO: 3.66 X10(3)/MCL (ref 0.6–4.6)
LYMPHOCYTES NFR BLD AUTO: 36.3 %
MCH RBC QN AUTO: 27.5 PG (ref 27–31)
MCHC RBC AUTO-ENTMCNC: 31.4 G/DL (ref 33–36)
MCV RBC AUTO: 87.7 FL (ref 80–94)
MONOCYTES # BLD AUTO: 0.55 X10(3)/MCL (ref 0.1–1.3)
MONOCYTES NFR BLD AUTO: 5.5 %
NEUTROPHILS # BLD AUTO: 5.61 X10(3)/MCL (ref 2.1–9.2)
NEUTROPHILS NFR BLD AUTO: 55.6 %
NRBC BLD AUTO-RTO: 0 %
PLATELET # BLD AUTO: 354 X10(3)/MCL (ref 130–400)
PMV BLD AUTO: 10.4 FL (ref 7.4–10.4)
POTASSIUM SERPL-SCNC: 3.9 MMOL/L (ref 3.5–5.1)
PROT SERPL-MCNC: 7.9 GM/DL (ref 6.4–8.3)
RBC # BLD AUTO: 4.62 X10(6)/MCL (ref 4.2–5.4)
SODIUM SERPL-SCNC: 139 MMOL/L (ref 136–145)
TRIGL SERPL-MCNC: 49 MG/DL (ref 37–140)
TSH SERPL-ACNC: 0.9 UIU/ML (ref 0.35–4.94)
VLDLC SERPL CALC-MCNC: 10 MG/DL
WBC # BLD AUTO: 10.08 X10(3)/MCL (ref 4.5–11.5)

## 2025-08-11 PROCEDURE — 85025 COMPLETE CBC W/AUTO DIFF WBC: CPT

## 2025-08-11 PROCEDURE — 80053 COMPREHEN METABOLIC PANEL: CPT

## 2025-08-11 PROCEDURE — 84443 ASSAY THYROID STIM HORMONE: CPT

## 2025-08-11 PROCEDURE — 36415 COLL VENOUS BLD VENIPUNCTURE: CPT

## 2025-08-11 PROCEDURE — 83036 HEMOGLOBIN GLYCOSYLATED A1C: CPT

## 2025-08-11 PROCEDURE — 82306 VITAMIN D 25 HYDROXY: CPT

## 2025-08-11 PROCEDURE — 80061 LIPID PANEL: CPT

## 2025-08-13 ENCOUNTER — OFFICE VISIT (OUTPATIENT)
Dept: FAMILY MEDICINE | Facility: CLINIC | Age: 30
End: 2025-08-13
Payer: COMMERCIAL

## 2025-08-13 VITALS
TEMPERATURE: 98 F | RESPIRATION RATE: 18 BRPM | OXYGEN SATURATION: 98 % | HEART RATE: 82 BPM | SYSTOLIC BLOOD PRESSURE: 103 MMHG | HEIGHT: 62 IN | DIASTOLIC BLOOD PRESSURE: 70 MMHG | WEIGHT: 174.69 LBS | BODY MASS INDEX: 32.15 KG/M2

## 2025-08-13 DIAGNOSIS — E66.811 CLASS 1 OBESITY WITH BODY MASS INDEX (BMI) OF 31.0 TO 31.9 IN ADULT, UNSPECIFIED OBESITY TYPE, UNSPECIFIED WHETHER SERIOUS COMORBIDITY PRESENT: ICD-10-CM

## 2025-08-13 DIAGNOSIS — E55.9 VITAMIN D DEFICIENCY: ICD-10-CM

## 2025-08-13 DIAGNOSIS — Z00.00 WELLNESS EXAMINATION: Primary | ICD-10-CM

## 2025-08-13 DIAGNOSIS — F43.10 PTSD (POST-TRAUMATIC STRESS DISORDER): ICD-10-CM

## 2025-08-13 PROCEDURE — 1160F RVW MEDS BY RX/DR IN RCRD: CPT | Mod: CPTII,,, | Performed by: NURSE PRACTITIONER

## 2025-08-13 PROCEDURE — 3008F BODY MASS INDEX DOCD: CPT | Mod: CPTII,,, | Performed by: NURSE PRACTITIONER

## 2025-08-13 PROCEDURE — 3078F DIAST BP <80 MM HG: CPT | Mod: CPTII,,, | Performed by: NURSE PRACTITIONER

## 2025-08-13 PROCEDURE — 3044F HG A1C LEVEL LT 7.0%: CPT | Mod: CPTII,,, | Performed by: NURSE PRACTITIONER

## 2025-08-13 PROCEDURE — 3074F SYST BP LT 130 MM HG: CPT | Mod: CPTII,,, | Performed by: NURSE PRACTITIONER

## 2025-08-13 PROCEDURE — 1159F MED LIST DOCD IN RCRD: CPT | Mod: CPTII,,, | Performed by: NURSE PRACTITIONER

## 2025-08-13 PROCEDURE — 99395 PREV VISIT EST AGE 18-39: CPT | Mod: ,,, | Performed by: NURSE PRACTITIONER

## 2025-08-13 RX ORDER — ASPIRIN 325 MG
50000 TABLET, DELAYED RELEASE (ENTERIC COATED) ORAL
Qty: 12 CAPSULE | Refills: 0 | Status: SHIPPED | OUTPATIENT
Start: 2025-08-13

## 2025-08-13 RX ORDER — BUSPIRONE HYDROCHLORIDE 10 MG/1
10 TABLET ORAL 2 TIMES DAILY
Qty: 60 TABLET | Refills: 11 | Status: SHIPPED | OUTPATIENT
Start: 2025-08-13 | End: 2026-08-13

## 2025-08-22 PROBLEM — D72.825 BANDEMIA: Status: ACTIVE | Noted: 2025-08-22
